# Patient Record
Sex: MALE | Race: WHITE | Employment: OTHER | URBAN - METROPOLITAN AREA
[De-identification: names, ages, dates, MRNs, and addresses within clinical notes are randomized per-mention and may not be internally consistent; named-entity substitution may affect disease eponyms.]

---

## 2017-04-28 RX ORDER — CARVEDILOL 12.5 MG/1
TABLET ORAL
Qty: 30 TABLET | Refills: 0 | Status: SHIPPED | OUTPATIENT
Start: 2017-04-28 | End: 2017-08-17

## 2017-04-28 RX ORDER — LISINOPRIL AND HYDROCHLOROTHIAZIDE 20; 12.5 MG/1; MG/1
TABLET ORAL
Qty: 15 TABLET | Refills: 0 | Status: SHIPPED | OUTPATIENT
Start: 2017-04-28 | End: 2017-08-17

## 2017-04-28 NOTE — TELEPHONE ENCOUNTER
Please call patient and have him schedule OV with Dr. Pam Gordon.   Last seen 7 months ago for his HTN, etc.  15 day supply only of these meds sent to his pharmacy until seen  thanks

## 2017-05-12 RX ORDER — LISINOPRIL AND HYDROCHLOROTHIAZIDE 20; 12.5 MG/1; MG/1
TABLET ORAL
Qty: 15 TABLET | Refills: 0 | OUTPATIENT
Start: 2017-05-12

## 2017-05-12 NOTE — TELEPHONE ENCOUNTER
Lisinopril-HCTZ not approved .  Patient needs to schedule appt for further refills  Please call to schedule  [phone] (home)

## 2017-08-17 ENCOUNTER — OFFICE VISIT (OUTPATIENT)
Dept: FAMILY MEDICINE CLINIC | Facility: CLINIC | Age: 57
End: 2017-08-17

## 2017-08-17 VITALS
HEIGHT: 70.25 IN | SYSTOLIC BLOOD PRESSURE: 134 MMHG | HEART RATE: 79 BPM | WEIGHT: 266.38 LBS | DIASTOLIC BLOOD PRESSURE: 74 MMHG | BODY MASS INDEX: 38.14 KG/M2

## 2017-08-17 DIAGNOSIS — Z12.5 SCREENING FOR MALIGNANT NEOPLASM OF PROSTATE: ICD-10-CM

## 2017-08-17 DIAGNOSIS — E66.01 SEVERE OBESITY (BMI 35.0-39.9): ICD-10-CM

## 2017-08-17 DIAGNOSIS — I83.893 SYMPTOMATIC VARICOSE VEINS OF BOTH LOWER EXTREMITIES: ICD-10-CM

## 2017-08-17 DIAGNOSIS — Z12.11 SCREENING FOR MALIGNANT NEOPLASM OF COLON: ICD-10-CM

## 2017-08-17 DIAGNOSIS — R79.89 ELEVATED LIVER FUNCTION TESTS: ICD-10-CM

## 2017-08-17 DIAGNOSIS — D69.6 THROMBOCYTOPENIA (HCC): ICD-10-CM

## 2017-08-17 DIAGNOSIS — E46 HYPOALBUMINEMIA DUE TO PROTEIN-CALORIE MALNUTRITION (HCC): ICD-10-CM

## 2017-08-17 DIAGNOSIS — E88.09 HYPOALBUMINEMIA DUE TO PROTEIN-CALORIE MALNUTRITION (HCC): ICD-10-CM

## 2017-08-17 DIAGNOSIS — F17.210 CIGARETTE SMOKER: ICD-10-CM

## 2017-08-17 DIAGNOSIS — D48.5 NEOPLASM OF UNCERTAIN BEHAVIOR OF SKIN: ICD-10-CM

## 2017-08-17 DIAGNOSIS — Z23 NEED FOR VACCINATION: ICD-10-CM

## 2017-08-17 DIAGNOSIS — I10 ESSENTIAL HYPERTENSION, BENIGN: Primary | ICD-10-CM

## 2017-08-17 DIAGNOSIS — R05.3 CHRONIC COUGH: ICD-10-CM

## 2017-08-17 PROCEDURE — 90471 IMMUNIZATION ADMIN: CPT | Performed by: FAMILY MEDICINE

## 2017-08-17 PROCEDURE — 99214 OFFICE O/P EST MOD 30 MIN: CPT | Performed by: FAMILY MEDICINE

## 2017-08-17 PROCEDURE — 90715 TDAP VACCINE 7 YRS/> IM: CPT | Performed by: FAMILY MEDICINE

## 2017-08-17 RX ORDER — CARVEDILOL 12.5 MG/1
12.5 TABLET ORAL 2 TIMES DAILY WITH MEALS
Qty: 90 TABLET | Refills: 1 | Status: SHIPPED | OUTPATIENT
Start: 2017-08-17 | End: 2017-12-06

## 2017-08-17 RX ORDER — LISINOPRIL AND HYDROCHLOROTHIAZIDE 20; 12.5 MG/1; MG/1
1 TABLET ORAL DAILY
Qty: 90 TABLET | Refills: 1 | Status: SHIPPED | OUTPATIENT
Start: 2017-08-17 | End: 2017-12-09

## 2017-08-17 NOTE — PATIENT INSTRUCTIONS
Please make an appointment for your annual wellness visit and follow up on your hypertension in 6 months or before the end of the year.

## 2017-08-17 NOTE — PROGRESS NOTES
Samreen Talamantes is a 61year old male. HPI:     HTN: Stable. Severity is mild, patient is on 3 agents to control his hypertension. No medication side effects.   Patient states he is taking the medication as directed and infrequently misses doses, cole abuse ([address] 9/23/2014   • Chronic cough 5/8/2015   • Cigarette smoker 5/8/2015    50 pack year h/o smoking    • Elevated liver function tests 7/24/2013   • Family history of bladder cancer 9/23/2014   • Hypoalbuminemia due to protein-calorie malnutrition ( communication, obese  male  SKIN: Raised hyperpigmented lesion of back. HEAD: NCAT  NECK: supple, no adenopathy, no thyromegaly, no masses  LUNGS: CTA A/P no wheezes/ronchi/rales/crackles.   Occasional cough dry to moist.  CARDIO: RRR, +S1/S2, no 12.4   PRELIMINARY NEUTROPHIL ABS      1.3 - 6.7 10(3)uL   1.66   Glucose      70 - 99 mg/dL 120 (H) 96 127 (H)   BUN      8 - 20 mg/dL 10 8 9   CREATININE      0.70 - 1.30 mg/dL 0.63 (L) 0.6 (L) 0.48 (L)   EGFR IF AFRICN AM      >60   145   EGFR IF NONAFR LIPID PANEL; Future  - ASSAY, THYROID STIM HORMONE; Future  - FREE T4 (FREE THYROXINE); Future    2. Thrombocytopenia ([facility] [address]  Chart updated to reflect this diagnosis. Patient currently without easy bleeding or easy bruising symptoms.   Recheck CBC in the fu (12.5 mg total) by mouth 2 (two) times daily with meals. Lisinopril-Hydrochlorothiazide 20-12.5 MG Oral Tab 90 tablet 1      Sig: Take 1 tablet by mouth daily.            Imaging & Consults:  TETANUS, DIPHTHERIA TOXOIDS AND ACELLULAR PERTUSIS VACCINE (

## 2017-08-22 PROBLEM — E88.09 HYPOALBUMINEMIA DUE TO PROTEIN-CALORIE MALNUTRITION (HCC): Status: ACTIVE | Noted: 2017-08-22

## 2017-08-22 PROBLEM — D69.6 THROMBOCYTOPENIA (HCC): Status: ACTIVE | Noted: 2017-08-22

## 2017-08-22 PROBLEM — I83.893 SYMPTOMATIC VARICOSE VEINS OF BOTH LOWER EXTREMITIES: Status: ACTIVE | Noted: 2017-08-22

## 2017-08-22 PROBLEM — E66.01 SEVERE OBESITY (BMI 35.0-39.9): Status: ACTIVE | Noted: 2017-08-22

## 2017-08-22 PROBLEM — E46 HYPOALBUMINEMIA DUE TO PROTEIN-CALORIE MALNUTRITION (HCC): Status: ACTIVE | Noted: 2017-08-22

## 2017-12-06 DIAGNOSIS — I10 ESSENTIAL HYPERTENSION, BENIGN: ICD-10-CM

## 2017-12-06 RX ORDER — CARVEDILOL 12.5 MG/1
12.5 TABLET ORAL 2 TIMES DAILY WITH MEALS
Qty: 30 TABLET | Refills: 0 | Status: SHIPPED | OUTPATIENT
Start: 2017-12-06 | End: 2019-02-14

## 2017-12-06 NOTE — TELEPHONE ENCOUNTER
rx approved qty 30 NR  Patient needs to complete labs prior to further refills  Copy of labs mailed to patient

## 2017-12-09 DIAGNOSIS — I10 ESSENTIAL HYPERTENSION, BENIGN: ICD-10-CM

## 2017-12-11 RX ORDER — ENOXAPARIN SODIUM 100 MG/ML
INJECTION SUBCUTANEOUS
Refills: 0 | COMMUNITY
Start: 2017-12-02 | End: 2018-12-17

## 2017-12-11 RX ORDER — LISINOPRIL AND HYDROCHLOROTHIAZIDE 20; 12.5 MG/1; MG/1
1 TABLET ORAL DAILY
Qty: 30 TABLET | Refills: 0 | Status: SHIPPED | OUTPATIENT
Start: 2017-12-11 | End: 2019-02-14

## 2017-12-11 NOTE — TELEPHONE ENCOUNTER
rx approved qty 30 NR  Patient past due to complete labs  Copy of lab orders has been mailed to patient

## 2017-12-31 DIAGNOSIS — I10 ESSENTIAL HYPERTENSION, BENIGN: ICD-10-CM

## 2018-01-02 RX ORDER — CARVEDILOL 12.5 MG/1
12.5 TABLET ORAL 2 TIMES DAILY WITH MEALS
Qty: 30 TABLET | Refills: 0 | OUTPATIENT
Start: 2018-01-02

## 2018-01-02 NOTE — TELEPHONE ENCOUNTER
Unable to refill carvedilol  Patient needs to complete labs  Lab orders were mailed to patient 12/6/17

## 2018-01-13 ENCOUNTER — OFFICE VISIT (OUTPATIENT)
Dept: FAMILY MEDICINE CLINIC | Facility: CLINIC | Age: 58
End: 2018-01-13

## 2018-01-13 VITALS
TEMPERATURE: 99 F | SYSTOLIC BLOOD PRESSURE: 122 MMHG | RESPIRATION RATE: 16 BRPM | OXYGEN SATURATION: 98 % | BODY MASS INDEX: 37.24 KG/M2 | HEART RATE: 60 BPM | HEIGHT: 71 IN | DIASTOLIC BLOOD PRESSURE: 72 MMHG | WEIGHT: 266 LBS

## 2018-01-13 DIAGNOSIS — J01.10 ACUTE NON-RECURRENT FRONTAL SINUSITIS: Primary | ICD-10-CM

## 2018-01-13 PROCEDURE — 99213 OFFICE O/P EST LOW 20 MIN: CPT | Performed by: NURSE PRACTITIONER

## 2018-01-13 RX ORDER — DOXYCYCLINE HYCLATE 100 MG
100 TABLET ORAL 2 TIMES DAILY
Qty: 14 TABLET | Refills: 0 | Status: SHIPPED | OUTPATIENT
Start: 2018-01-13 | End: 2018-01-20

## 2018-01-13 RX ORDER — FLUTICASONE PROPIONATE 50 MCG
2 SPRAY, SUSPENSION (ML) NASAL DAILY
Qty: 1 INHALER | Refills: 1 | Status: SHIPPED | OUTPATIENT
Start: 2018-01-13 | End: 2018-12-17

## 2018-01-13 NOTE — PROGRESS NOTES
CHIEF COMPLAINT:   Patient presents with:  Sinus Problem: both ear clot, sinus congestion, chest congestion [id number]    HPI:   Jc Guadarrama is a 61year old male who presents for sinus congestion for  12  days.  Symptoms have been worsening since onset Smoking status: Current Every Day Smoker                                                   Packs/day: 0.50      Years: 50.00        Types: Cigarettes  Smokeless tobacco: Former User                        Types: Snuff  Comment: 2ppd for 20yrs, 1ppd for 10 1. Acute non-recurrent frontal sinusitis  - Doxycycline Hyclate 100 MG Oral Tab; Take 1 tablet (100 mg total) by mouth 2 (two) times daily. Dispense: 14 tablet;  Refill: 0  - Fluticasone Propionate (FLONASE) 50 MCG/ACT Nasal Suspension; 2 sprays by Each Na · Heat may help soothe painful areas of the face. Use a towel soaked in hot water. Or,  the shower and direct the hot spray onto your face.  Using a vaporizer along with a menthol rub at night may also help.   · An expectorant containing guaifenesin · Vision problems, including blurred or double vision  · Fever of 100.4ºF (38ºC) or higher, or as directed by your healthcare provider  · Seizure  · Breathing problems  · Symptoms not resolving within 10 days  Date Last Reviewed: 4/13/2015  © 7548-7969 The Your doctor may prescribe medications to help treat your sinusitis. If you have an infection, antibiotics can help clear it up. If you are prescribed antibiotics, take all pills on schedule until they are gone, even if you feel better.  Decongestants help r

## 2018-11-26 ENCOUNTER — TELEPHONE (OUTPATIENT)
Dept: FAMILY MEDICINE CLINIC | Facility: CLINIC | Age: 58
End: 2018-11-26

## 2018-11-26 DIAGNOSIS — E46 HYPOALBUMINEMIA DUE TO PROTEIN-CALORIE MALNUTRITION (HCC): ICD-10-CM

## 2018-11-26 DIAGNOSIS — E88.09 HYPOALBUMINEMIA DUE TO PROTEIN-CALORIE MALNUTRITION (HCC): ICD-10-CM

## 2018-11-26 DIAGNOSIS — Z12.5 SCREENING FOR MALIGNANT NEOPLASM OF PROSTATE: Primary | ICD-10-CM

## 2018-11-26 DIAGNOSIS — D69.6 THROMBOCYTOPENIA, UNSPECIFIED (HCC): ICD-10-CM

## 2018-11-26 DIAGNOSIS — I10 ESSENTIAL (PRIMARY) HYPERTENSION: ICD-10-CM

## 2018-11-26 NOTE — TELEPHONE ENCOUNTER
Pt wants to know if the labs from last year can be re-ordered so he can come in and get them done. He knows he never had the testing done that Dr. Юлия Gomez wanted him to get done but he said he is ready to get it all done.

## 2018-11-27 NOTE — TELEPHONE ENCOUNTER
Okay to reorder labs. Please encourage patient to make an appointment to see me after labs are completed.

## 2018-11-27 NOTE — TELEPHONE ENCOUNTER
Labs reordered   Per phone consent left message for patient regarding labs and need to schedule appt with Dr Varinder Silva

## 2018-12-04 ENCOUNTER — LAB ENCOUNTER (OUTPATIENT)
Dept: LAB | Age: 58
End: 2018-12-04
Attending: FAMILY MEDICINE
Payer: COMMERCIAL

## 2018-12-04 DIAGNOSIS — I10 ESSENTIAL (PRIMARY) HYPERTENSION: ICD-10-CM

## 2018-12-04 DIAGNOSIS — D69.6 THROMBOCYTOPENIA, UNSPECIFIED (HCC): ICD-10-CM

## 2018-12-04 DIAGNOSIS — E46 HYPOALBUMINEMIA DUE TO PROTEIN-CALORIE MALNUTRITION (HCC): ICD-10-CM

## 2018-12-04 DIAGNOSIS — E88.09 HYPOALBUMINEMIA DUE TO PROTEIN-CALORIE MALNUTRITION (HCC): ICD-10-CM

## 2018-12-04 DIAGNOSIS — Z12.5 SCREENING FOR MALIGNANT NEOPLASM OF PROSTATE: ICD-10-CM

## 2018-12-04 PROCEDURE — 80053 COMPREHEN METABOLIC PANEL: CPT

## 2018-12-04 PROCEDURE — 80061 LIPID PANEL: CPT

## 2018-12-04 PROCEDURE — 84443 ASSAY THYROID STIM HORMONE: CPT

## 2018-12-04 PROCEDURE — 36415 COLL VENOUS BLD VENIPUNCTURE: CPT

## 2018-12-04 PROCEDURE — 85025 COMPLETE CBC W/AUTO DIFF WBC: CPT

## 2018-12-04 PROCEDURE — 84439 ASSAY OF FREE THYROXINE: CPT

## 2018-12-10 ENCOUNTER — TELEPHONE (OUTPATIENT)
Dept: FAMILY MEDICINE CLINIC | Facility: CLINIC | Age: 58
End: 2018-12-10

## 2018-12-10 NOTE — TELEPHONE ENCOUNTER
----- Message from Veronica Darling DO sent at 12/9/2018  4:59 PM CST -----  Regarding: Very Abnormal CBC  Hello S[place],  The above patient has a very abnormal CBC. Diagnosis is pancytopenia.  (Pancytopenia definition is - an abnormal reduction in the number

## 2018-12-10 NOTE — TELEPHONE ENCOUNTER
lmom for pt to call office to discuss. Pt called back and discussed the results and gave him the number to the cancer [facility] center. Asked him to schedule with the first available provider and to also call our office with the schedule date.  Pt verbalize

## 2018-12-11 NOTE — TELEPHONE ENCOUNTER
Pt did call back and states that he has an appt with Dr. Felisa Schaefer on 12/26/18 at 9:45 as that was the first available. Discussed with Dr. Hanane Moreno and she wants pt seen sooner.   Placed a call to Dr. Cristina Castillo office per Dr. Hanane Moreno and del for [facility] to ca

## 2018-12-17 ENCOUNTER — OFFICE VISIT (OUTPATIENT)
Dept: HEMATOLOGY/ONCOLOGY | Facility: HOSPITAL | Age: 58
End: 2018-12-17
Attending: INTERNAL MEDICINE
Payer: COMMERCIAL

## 2018-12-17 VITALS
HEART RATE: 65 BPM | OXYGEN SATURATION: 98 % | SYSTOLIC BLOOD PRESSURE: 158 MMHG | HEIGHT: 71.89 IN | DIASTOLIC BLOOD PRESSURE: 91 MMHG | TEMPERATURE: 99 F | RESPIRATION RATE: 18 BRPM | WEIGHT: 254.5 LBS | BODY MASS INDEX: 34.47 KG/M2

## 2018-12-17 DIAGNOSIS — D69.6 THROMBOCYTOPENIA (HCC): ICD-10-CM

## 2018-12-17 DIAGNOSIS — D61.818 PANCYTOPENIA (HCC): Primary | ICD-10-CM

## 2018-12-17 DIAGNOSIS — R79.89 ELEVATED LFTS: ICD-10-CM

## 2018-12-17 PROCEDURE — 99245 OFF/OP CONSLTJ NEW/EST HI 55: CPT | Performed by: INTERNAL MEDICINE

## 2018-12-17 NOTE — PROGRESS NOTES
Pt here for consult referred by Dr. Yamileth Toth for abn blood work. Pt has been feeling well. Denies pain/bleeding/fevers. Pt had teeth pulled and has had to change his diet to certain types of foods and has lost 30 pounds in 1 month.

## 2018-12-17 NOTE — PROGRESS NOTES
Hematology/Oncology Initial Consultation Note    Patient Name: Haleigh Birmingham Record Number: [id number]    YOB: 1960   Date of Consultation: 12/17/2018   PCP: Dr. Adeline Medrano     Reason for Consultation:  Gerry witt se Medical History:   Diagnosis Date   • Alcoholism /alcohol abuse ([facility] [address] 9/23/2014   • Chronic cough 5/8/2015   • Cigarette smoker 5/8/2015    50 pack year h/o smoking    • Elevated liver function tests 7/24/2013   • Family history of bladder cancer 9/23/2014 Drug use: No    Family Medical History:  Family History   Problem Relation Age of Onset   • Cancer Neg    • Blood Disorder Neg      Review of Systems:  A 10-point ROS was done with pertinent positives and negative per the HPI    Vital Signs:  Height: 182. 6 12/04/2018     10/25/2016    GFRNAA 108 12/04/2018    GFRAA 125 12/04/2018    CA 8.2 (L) 12/04/2018    OSMOCALC 290 12/04/2018    ALKPHO 190 (H) 12/04/2018    AST 86 (H) 12/04/2018    ALT 44 12/04/2018    BILT 1.5 12/04/2018    TP 7.2 12/04/2018

## 2018-12-26 ENCOUNTER — APPOINTMENT (OUTPATIENT)
Dept: HEMATOLOGY/ONCOLOGY | Facility: HOSPITAL | Age: 58
End: 2018-12-26
Payer: COMMERCIAL

## 2019-01-05 ENCOUNTER — HOSPITAL ENCOUNTER (OUTPATIENT)
Dept: ULTRASOUND IMAGING | Age: 59
Discharge: HOME | End: 2019-01-05
Attending: INTERNAL MEDICINE
Payer: COMMERCIAL

## 2019-01-05 DIAGNOSIS — D61.818 PANCYTOPENIA (HCC): ICD-10-CM

## 2019-01-05 DIAGNOSIS — R79.89 ELEVATED LFTS: ICD-10-CM

## 2019-01-05 DIAGNOSIS — D69.6 THROMBOCYTOPENIA (HCC): ICD-10-CM

## 2019-01-05 PROCEDURE — 76700 US EXAM ABDOM COMPLETE: CPT | Performed by: INTERNAL MEDICINE

## 2019-01-08 ENCOUNTER — TELEPHONE (OUTPATIENT)
Dept: HEMATOLOGY/ONCOLOGY | Facility: HOSPITAL | Age: 59
End: 2019-01-08

## 2019-01-08 NOTE — TELEPHONE ENCOUNTER
I called patient and reviewed his imaging findings as well as his lab workup for thrombocytopenia.   His hepatitis C testing came back positive and he has an associated polyclonal hypergammaglobulinemia and LFT elevation as well as liver changes noted on hi

## 2019-01-21 ENCOUNTER — TELEPHONE (OUTPATIENT)
Dept: SURGERY | Facility: CLINIC | Age: 59
End: 2019-01-21

## 2019-01-21 ENCOUNTER — LAB ENCOUNTER (OUTPATIENT)
Dept: LAB | Age: 59
End: 2019-01-21
Attending: INTERNAL MEDICINE
Payer: COMMERCIAL

## 2019-01-21 ENCOUNTER — OFFICE VISIT (OUTPATIENT)
Dept: SURGERY | Facility: CLINIC | Age: 59
End: 2019-01-21
Payer: COMMERCIAL

## 2019-01-21 VITALS
HEART RATE: 66 BPM | DIASTOLIC BLOOD PRESSURE: 88 MMHG | OXYGEN SATURATION: 97 % | SYSTOLIC BLOOD PRESSURE: 151 MMHG | WEIGHT: 258 LBS | RESPIRATION RATE: 16 BRPM | BODY MASS INDEX: 35 KG/M2

## 2019-01-21 DIAGNOSIS — K74.60 CHRONIC HEPATITIS C WITH CIRRHOSIS (HCC): ICD-10-CM

## 2019-01-21 DIAGNOSIS — B18.2 CHRONIC HEPATITIS C WITH CIRRHOSIS (HCC): ICD-10-CM

## 2019-01-21 DIAGNOSIS — K74.60 CHRONIC HEPATITIS C WITH CIRRHOSIS (HCC): Primary | ICD-10-CM

## 2019-01-21 DIAGNOSIS — R77.2 ELEVATED AFP: ICD-10-CM

## 2019-01-21 DIAGNOSIS — C22.0 HEPATOCELLULAR CARCINOMA (HCC): Primary | ICD-10-CM

## 2019-01-21 DIAGNOSIS — B18.2 CHRONIC HEPATITIS C WITH CIRRHOSIS (HCC): Primary | ICD-10-CM

## 2019-01-21 LAB
AFP-TM SERPL-MCNC: 3785.7 NG/ML (ref ?–8)
ALBUMIN SERPL-MCNC: 3 G/DL (ref 3.1–4.5)
ALBUMIN/GLOB SERPL: 0.6 {RATIO} (ref 1–2)
ALP LIVER SERPL-CCNC: 234 U/L (ref 45–117)
ALT SERPL-CCNC: 56 U/L (ref 17–63)
ANION GAP SERPL CALC-SCNC: 4 MMOL/L (ref 0–18)
AST SERPL-CCNC: 118 U/L (ref 15–41)
BASOPHILS # BLD AUTO: 0.02 X10(3) UL (ref 0–0.1)
BASOPHILS NFR BLD AUTO: 0.7 %
BILIRUB SERPL-MCNC: 2.1 MG/DL (ref 0.1–2)
BUN BLD-MCNC: 9 MG/DL (ref 8–20)
BUN/CREAT SERPL: 12.2 (ref 10–20)
CALCIUM BLD-MCNC: 8.5 MG/DL (ref 8.3–10.3)
CHLORIDE SERPL-SCNC: 105 MMOL/L (ref 101–111)
CO2 SERPL-SCNC: 30 MMOL/L (ref 22–32)
CREAT BLD-MCNC: 0.74 MG/DL (ref 0.7–1.3)
EOSINOPHIL # BLD AUTO: 0.1 X10(3) UL (ref 0–0.3)
EOSINOPHIL NFR BLD AUTO: 3.7 %
ERYTHROCYTE [DISTWIDTH] IN BLOOD BY AUTOMATED COUNT: 13.7 % (ref 11.5–16)
GLOBULIN PLAS-MCNC: 4.7 G/DL (ref 2.8–4.4)
GLUCOSE BLD-MCNC: 107 MG/DL (ref 70–99)
HBV CORE AB SERPL QL IA: NONREACTIVE
HBV SURFACE AB SER QL: NONREACTIVE
HBV SURFACE AB SERPL IA-ACNC: <3.1 MIU/ML
HBV SURFACE AG SER-ACNC: <0.1 [IU]/L
HBV SURFACE AG SERPL QL IA: NONREACTIVE
HCT VFR BLD AUTO: 35.9 % (ref 37–53)
HEPATITIS A VIRUS AB, TOTAL: NONREACTIVE
HGB BLD-MCNC: 13.1 G/DL (ref 13–17)
IMMATURE GRANULOCYTE COUNT: 0.02 X10(3) UL (ref 0–1)
IMMATURE GRANULOCYTE RATIO %: 0.7 %
INR BLD: 1.38 (ref 0.9–1.1)
LYMPHOCYTES # BLD AUTO: 0.81 X10(3) UL (ref 0.9–4)
LYMPHOCYTES NFR BLD AUTO: 29.9 %
M PROTEIN MFR SERPL ELPH: 7.7 G/DL (ref 6.4–8.2)
MCH RBC QN AUTO: 36.6 PG (ref 27–33.2)
MCHC RBC AUTO-ENTMCNC: 36.5 G/DL (ref 31–37)
MCV RBC AUTO: 100.3 FL (ref 80–99)
MONOCYTES # BLD AUTO: 0.33 X10(3) UL (ref 0.1–1)
MONOCYTES NFR BLD AUTO: 12.2 %
NEUTROPHIL ABS PRELIM: 1.43 X10 (3) UL (ref 1.3–6.7)
NEUTROPHILS # BLD AUTO: 1.43 X10(3) UL (ref 1.3–6.7)
NEUTROPHILS NFR BLD AUTO: 52.8 %
OSMOLALITY SERPL CALC.SUM OF ELEC: 287 MOSM/KG (ref 275–295)
PLATELET # BLD AUTO: 69 10(3)UL (ref 150–450)
POTASSIUM SERPL-SCNC: 3.8 MMOL/L (ref 3.6–5.1)
PSA SERPL DL<=0.01 NG/ML-MCNC: 17.5 SECONDS (ref 12.4–14.7)
RBC # BLD AUTO: 3.58 X10(6)UL (ref 4.3–5.7)
RED CELL DISTRIBUTION WIDTH-SD: 50.9 FL (ref 35.1–46.3)
SODIUM SERPL-SCNC: 139 MMOL/L (ref 136–144)
WBC # BLD AUTO: 2.7 X10(3) UL (ref 4–13)

## 2019-01-21 PROCEDURE — 84460 ALANINE AMINO (ALT) (SGPT): CPT

## 2019-01-21 PROCEDURE — 85610 PROTHROMBIN TIME: CPT

## 2019-01-21 PROCEDURE — 83883 ASSAY NEPHELOMETRY NOT SPEC: CPT

## 2019-01-21 PROCEDURE — 85049 AUTOMATED PLATELET COUNT: CPT

## 2019-01-21 PROCEDURE — 85025 COMPLETE CBC W/AUTO DIFF WBC: CPT

## 2019-01-21 PROCEDURE — 82977 ASSAY OF GGT: CPT

## 2019-01-21 PROCEDURE — 82105 ALPHA-FETOPROTEIN SERUM: CPT

## 2019-01-21 PROCEDURE — 87340 HEPATITIS B SURFACE AG IA: CPT

## 2019-01-21 PROCEDURE — 80053 COMPREHEN METABOLIC PANEL: CPT

## 2019-01-21 PROCEDURE — 86708 HEPATITIS A ANTIBODY: CPT

## 2019-01-21 PROCEDURE — 84450 TRANSFERASE (AST) (SGOT): CPT

## 2019-01-21 PROCEDURE — 86704 HEP B CORE ANTIBODY TOTAL: CPT

## 2019-01-21 PROCEDURE — 86706 HEP B SURFACE ANTIBODY: CPT

## 2019-01-21 PROCEDURE — 36415 COLL VENOUS BLD VENIPUNCTURE: CPT

## 2019-01-21 PROCEDURE — 84520 ASSAY OF UREA NITROGEN: CPT

## 2019-01-21 NOTE — PROGRESS NOTES
[facility]  [address], [address]  [address]  [phone] ([phone]) 45.00        Pack years: 45        Types: Cigarettes      Smokeless tobacco: Former User        Types: Snuff      Tobacco comment: 2ppd for 20yrs, 1ppd for 10 years, 10 cigs per day for the last few years    Alcohol use:  Yes      Alcohol/week: 1.2 - 2.4 oz [address] [place] [address] [facility] (office)  [phone] (fax)  [phone] (mobile)  [email or web address]

## 2019-01-22 NOTE — TELEPHONE ENCOUNTER
Discussed with patient the significantly elevated AFP and urgent need to get CT scan done. He will schedule CT as planned.

## 2019-01-24 LAB
ALANINE AMINOTRANSFERASE, FIBROMETER: 52 U/L
ALPHA-2 MACROGLOBULIN, FIBROMETER: 297 MG/DL
ASPARTATE AMINOTRANSFERASE, FIBROMETER: 116 U/L
CIRRHOMETER PATIENT SCORE: 0.98
FIBROMETER PATIENT SCORE: 0.99
FIBROMETER PLATELET COUNT: 69 K/UL
FIBROMETER PROTHROMBIN INDEX: 53 %
GAMMA GLUTAMYL TRANSFERASE, FIBROMETER: 243 U/L
INFLAMETER PATIENT SCORE: 0.83
UREA NITROGEN, SERUM, FIBROMETER: 10 MG/DL

## 2019-02-02 ENCOUNTER — HOSPITAL ENCOUNTER (OUTPATIENT)
Dept: CT IMAGING | Facility: HOSPITAL | Age: 59
Discharge: HOME | End: 2019-02-02
Attending: INTERNAL MEDICINE
Payer: COMMERCIAL

## 2019-02-02 DIAGNOSIS — K74.60 CHRONIC HEPATITIS C WITH CIRRHOSIS (HCC): ICD-10-CM

## 2019-02-02 DIAGNOSIS — B18.2 CHRONIC HEPATITIS C WITH CIRRHOSIS (HCC): ICD-10-CM

## 2019-02-02 DIAGNOSIS — R77.2 ELEVATED AFP: ICD-10-CM

## 2019-02-02 LAB — CREAT SERPL-MCNC: 0.7 MG/DL (ref 0.7–1.3)

## 2019-02-02 PROCEDURE — 82565 ASSAY OF CREATININE: CPT

## 2019-02-02 PROCEDURE — 74170 CT ABD WO CNTRST FLWD CNTRST: CPT | Performed by: INTERNAL MEDICINE

## 2019-02-03 ENCOUNTER — HOSPITAL ENCOUNTER (OUTPATIENT)
Dept: CT IMAGING | Age: 59
Discharge: HOME | End: 2019-02-03
Attending: INTERNAL MEDICINE
Payer: COMMERCIAL

## 2019-02-03 DIAGNOSIS — C22.0 HEPATOCELLULAR CARCINOMA (HCC): ICD-10-CM

## 2019-02-03 PROCEDURE — 71250 CT THORAX DX C-: CPT | Performed by: INTERNAL MEDICINE

## 2019-02-11 ENCOUNTER — TELEPHONE (OUTPATIENT)
Dept: SURGERY | Facility: CLINIC | Age: 59
End: 2019-02-11

## 2019-02-11 DIAGNOSIS — B18.2 CHRONIC HEPATITIS C WITH CIRRHOSIS (HCC): Primary | ICD-10-CM

## 2019-02-11 DIAGNOSIS — R77.2 ELEVATED AFP: ICD-10-CM

## 2019-02-11 DIAGNOSIS — K74.60 CHRONIC HEPATITIS C WITH CIRRHOSIS (HCC): Primary | ICD-10-CM

## 2019-02-11 NOTE — TELEPHONE ENCOUNTER
Discussed results of scans with patient. Will get MRI to see if can better delineate mass, probably infiltrative. Lung lesion could easily be second primary.  Will likely need biopsy as this will help decide treatment on liver cancer if local infiltrative v

## 2019-02-14 ENCOUNTER — OFFICE VISIT (OUTPATIENT)
Dept: FAMILY MEDICINE CLINIC | Facility: CLINIC | Age: 59
End: 2019-02-14
Payer: COMMERCIAL

## 2019-02-14 VITALS
BODY MASS INDEX: 35.42 KG/M2 | OXYGEN SATURATION: 98 % | SYSTOLIC BLOOD PRESSURE: 102 MMHG | WEIGHT: 253 LBS | HEART RATE: 68 BPM | HEIGHT: 71 IN | DIASTOLIC BLOOD PRESSURE: 66 MMHG

## 2019-02-14 DIAGNOSIS — D61.818 PANCYTOPENIA (HCC): ICD-10-CM

## 2019-02-14 DIAGNOSIS — R16.1 SPLENOMEGALY: ICD-10-CM

## 2019-02-14 DIAGNOSIS — I10 ESSENTIAL HYPERTENSION, BENIGN: Primary | ICD-10-CM

## 2019-02-14 DIAGNOSIS — K74.60 CHRONIC HEPATITIS C WITH CIRRHOSIS (HCC): ICD-10-CM

## 2019-02-14 DIAGNOSIS — B18.2 CHRONIC HEPATITIS C WITH CIRRHOSIS (HCC): ICD-10-CM

## 2019-02-14 DIAGNOSIS — E88.09 HYPOALBUMINEMIA: ICD-10-CM

## 2019-02-14 DIAGNOSIS — D69.6 THROMBOCYTOPENIA (HCC): ICD-10-CM

## 2019-02-14 DIAGNOSIS — R60.0 BILATERAL LOWER EXTREMITY EDEMA: ICD-10-CM

## 2019-02-14 DIAGNOSIS — R16.0 LIVER MASSES: ICD-10-CM

## 2019-02-14 DIAGNOSIS — R91.8 MASS OF UPPER LOBE OF RIGHT LUNG: ICD-10-CM

## 2019-02-14 DIAGNOSIS — R73.01 IMPAIRED FASTING GLUCOSE: ICD-10-CM

## 2019-02-14 LAB
EST. AVERAGE GLUCOSE BLD GHB EST-MCNC: 111 MG/DL (ref 68–126)
HBA1C MFR BLD HPLC: 5.5 % (ref ?–5.7)

## 2019-02-14 PROCEDURE — 99214 OFFICE O/P EST MOD 30 MIN: CPT | Performed by: FAMILY MEDICINE

## 2019-02-14 PROCEDURE — 83036 HEMOGLOBIN GLYCOSYLATED A1C: CPT | Performed by: FAMILY MEDICINE

## 2019-02-14 RX ORDER — CARVEDILOL 12.5 MG/1
12.5 TABLET ORAL 2 TIMES DAILY WITH MEALS
Qty: 180 TABLET | Refills: 1 | Status: SHIPPED | OUTPATIENT
Start: 2019-02-14 | End: 2019-09-21

## 2019-02-14 RX ORDER — LISINOPRIL AND HYDROCHLOROTHIAZIDE 20; 12.5 MG/1; MG/1
1 TABLET ORAL DAILY
Qty: 90 TABLET | Refills: 1 | Status: SHIPPED | OUTPATIENT
Start: 2019-02-14 | End: 2019-03-18

## 2019-02-14 NOTE — PROGRESS NOTES
Gerri Perez is a 60year old male. HPI:     HTN:    Stable. Severity is mild to moderate pt is on 3 agents to control his hypertension. Pt has been taking medications as instructed, no medication side effects.    Diet: No particular diet being fo Laterality Date   • BIOPSY OF SKIN LESION      face and leg   • IR VARICOSE VEIN ENDOVENOUS LASER ABLATION(CPT=36478)  2018      Social History:    Social History    Tobacco Use      Smoking status: Current Every Day Smoker        Packs/day: 1.00        Sarah Moon grossly intact, no focal weakness  PSYCH: affect normal, normal thought content.         DATA:    Component      Latest Ref Rng & Units 12/4/2018 10/25/2016   Glucose      70 - 99 mg/dL 114 (H) 120 (H)   Sodium      136 - 144 mmol/L 140 137   Potassium lisinopril–hydrochlorothiazide. Follow-up office visit in 3 months. - Lisinopril-Hydrochlorothiazide 20-12.5 MG Oral Tab; Take 1 tablet by mouth daily. Dispense: 90 tablet; Refill: 1  - carvedilol 12.5 MG Oral Tab;  Take 1 tablet (12.5 mg total) by chacorta

## 2019-02-20 ENCOUNTER — HOSPITAL ENCOUNTER (OUTPATIENT)
Dept: MRI IMAGING | Age: 59
Discharge: HOME | End: 2019-02-20
Attending: INTERNAL MEDICINE
Payer: COMMERCIAL

## 2019-02-20 DIAGNOSIS — B18.2 CHRONIC HEPATITIS C WITH CIRRHOSIS (HCC): ICD-10-CM

## 2019-02-20 DIAGNOSIS — R77.2 ELEVATED AFP: ICD-10-CM

## 2019-02-20 DIAGNOSIS — K74.60 CHRONIC HEPATITIS C WITH CIRRHOSIS (HCC): ICD-10-CM

## 2019-02-20 PROCEDURE — A9581 GADOXETATE DISODIUM INJ: HCPCS | Performed by: INTERNAL MEDICINE

## 2019-02-20 PROCEDURE — 74183 MRI ABD W/O CNTR FLWD CNTR: CPT | Performed by: INTERNAL MEDICINE

## 2019-02-22 ENCOUNTER — TELEPHONE (OUTPATIENT)
Dept: SURGERY | Facility: CLINIC | Age: 59
End: 2019-02-22

## 2019-02-22 DIAGNOSIS — R91.1 LESION OF RIGHT LUNG: Primary | ICD-10-CM

## 2019-02-22 NOTE — TELEPHONE ENCOUNTER
Reviewed MRI with IR. Concern for infiltrating HCC but no discreet mass on MRI. Will plan on biopsy of lung lesion to evaluate for second primary versus metastatic disease and then clarify approach to [facility] [address] Discussed with patient and order placed.

## 2019-03-13 ENCOUNTER — HOSPITAL ENCOUNTER (OUTPATIENT)
Dept: GENERAL RADIOLOGY | Facility: HOSPITAL | Age: 59
Discharge: HOME | End: 2019-03-13
Attending: RADIOLOGY
Payer: COMMERCIAL

## 2019-03-13 ENCOUNTER — HOSPITAL ENCOUNTER (OUTPATIENT)
Dept: CT IMAGING | Facility: HOSPITAL | Age: 59
Discharge: HOME | End: 2019-03-13
Attending: INTERNAL MEDICINE
Payer: COMMERCIAL

## 2019-03-13 ENCOUNTER — APPOINTMENT (OUTPATIENT)
Dept: LAB | Facility: HOSPITAL | Age: 59
End: 2019-03-13
Attending: INTERNAL MEDICINE
Payer: COMMERCIAL

## 2019-03-13 VITALS
DIASTOLIC BLOOD PRESSURE: 73 MMHG | HEIGHT: 72 IN | SYSTOLIC BLOOD PRESSURE: 118 MMHG | WEIGHT: 256 LBS | BODY MASS INDEX: 34.67 KG/M2 | RESPIRATION RATE: 18 BRPM | OXYGEN SATURATION: 100 % | TEMPERATURE: 98 F | HEART RATE: 65 BPM

## 2019-03-13 DIAGNOSIS — C34.90 LUNG CANCER (HCC): ICD-10-CM

## 2019-03-13 DIAGNOSIS — R91.1 LESION OF RIGHT LUNG: ICD-10-CM

## 2019-03-13 DIAGNOSIS — R91.8 MASS OF UPPER LOBE OF RIGHT LUNG: ICD-10-CM

## 2019-03-13 DIAGNOSIS — R91.8 MASS OF UPPER LOBE OF RIGHT LUNG: Primary | ICD-10-CM

## 2019-03-13 LAB
DEPRECATED RDW RBC AUTO: 55.3 FL (ref 35.1–46.3)
ERYTHROCYTE [DISTWIDTH] IN BLOOD BY AUTOMATED COUNT: 14.8 % (ref 11–15)
HCT VFR BLD AUTO: 37.1 % (ref 39–53)
HGB BLD-MCNC: 13.3 G/DL (ref 13–17.5)
INR BLD: 1.5 (ref 0.9–1.1)
MCH RBC QN AUTO: 35.7 PG (ref 26–34)
MCHC RBC AUTO-ENTMCNC: 35.8 G/DL (ref 31–37)
MCV RBC AUTO: 99.5 FL (ref 80–100)
PLATELET # BLD AUTO: 84 10(3)UL (ref 150–450)
PSA SERPL DL<=0.01 NG/ML-MCNC: 18.9 SECONDS (ref 12.5–14.7)
RBC # BLD AUTO: 3.73 X10(6)UL (ref 4.3–5.7)
WBC # BLD AUTO: 3.4 X10(3) UL (ref 4–11)

## 2019-03-13 PROCEDURE — 99153 MOD SED SAME PHYS/QHP EA: CPT | Performed by: INTERNAL MEDICINE

## 2019-03-13 PROCEDURE — 99152 MOD SED SAME PHYS/QHP 5/>YRS: CPT | Performed by: INTERNAL MEDICINE

## 2019-03-13 PROCEDURE — 85027 COMPLETE CBC AUTOMATED: CPT

## 2019-03-13 PROCEDURE — 88360 TUMOR IMMUNOHISTOCHEM/MANUAL: CPT | Performed by: INTERNAL MEDICINE

## 2019-03-13 PROCEDURE — 88342 IMHCHEM/IMCYTCHM 1ST ANTB: CPT | Performed by: INTERNAL MEDICINE

## 2019-03-13 PROCEDURE — 88305 TISSUE EXAM BY PATHOLOGIST: CPT | Performed by: INTERNAL MEDICINE

## 2019-03-13 PROCEDURE — 36415 COLL VENOUS BLD VENIPUNCTURE: CPT

## 2019-03-13 PROCEDURE — 85610 PROTHROMBIN TIME: CPT

## 2019-03-13 PROCEDURE — 32405 CT BIOPSY LUNG OR MEDIASTINUM (CPT=77012/32405): CPT | Performed by: INTERNAL MEDICINE

## 2019-03-13 PROCEDURE — 88341 IMHCHEM/IMCYTCHM EA ADD ANTB: CPT | Performed by: INTERNAL MEDICINE

## 2019-03-13 PROCEDURE — 71045 X-RAY EXAM CHEST 1 VIEW: CPT | Performed by: RADIOLOGY

## 2019-03-13 PROCEDURE — 77012 CT SCAN FOR NEEDLE BIOPSY: CPT | Performed by: INTERNAL MEDICINE

## 2019-03-13 RX ORDER — SODIUM CHLORIDE 9 MG/ML
INJECTION, SOLUTION INTRAVENOUS CONTINUOUS
Status: DISCONTINUED | OUTPATIENT
Start: 2019-03-13 | End: 2019-03-15

## 2019-03-13 RX ORDER — MIDAZOLAM HYDROCHLORIDE 1 MG/ML
INJECTION INTRAMUSCULAR; INTRAVENOUS
Status: COMPLETED
Start: 2019-03-13 | End: 2019-03-13

## 2019-03-13 RX ORDER — NALOXONE HYDROCHLORIDE 0.4 MG/ML
80 INJECTION, SOLUTION INTRAMUSCULAR; INTRAVENOUS; SUBCUTANEOUS AS NEEDED
Status: DISCONTINUED | OUTPATIENT
Start: 2019-03-13 | End: 2019-03-15

## 2019-03-13 RX ORDER — MORPHINE SULFATE 2 MG/ML
2 INJECTION, SOLUTION INTRAMUSCULAR; INTRAVENOUS EVERY 2 HOUR PRN
Status: DISCONTINUED | OUTPATIENT
Start: 2019-03-13 | End: 2019-03-15

## 2019-03-13 RX ORDER — HYDROCODONE BITARTRATE AND ACETAMINOPHEN 5; 325 MG/1; MG/1
2 TABLET ORAL EVERY 4 HOURS PRN
Status: DISCONTINUED | OUTPATIENT
Start: 2019-03-13 | End: 2019-03-15

## 2019-03-13 RX ORDER — MIDAZOLAM HYDROCHLORIDE 1 MG/ML
1 INJECTION INTRAMUSCULAR; INTRAVENOUS EVERY 5 MIN PRN
Status: DISCONTINUED | OUTPATIENT
Start: 2019-03-13 | End: 2019-03-15

## 2019-03-13 RX ORDER — ACETAMINOPHEN 325 MG/1
650 TABLET ORAL EVERY 6 HOURS PRN
Status: DISCONTINUED | OUTPATIENT
Start: 2019-03-13 | End: 2019-03-15

## 2019-03-13 RX ORDER — HYDROCODONE BITARTRATE AND ACETAMINOPHEN 5; 325 MG/1; MG/1
1 TABLET ORAL EVERY 4 HOURS PRN
Status: DISCONTINUED | OUTPATIENT
Start: 2019-03-13 | End: 2019-03-15

## 2019-03-13 RX ORDER — FLUMAZENIL 0.1 MG/ML
0.2 INJECTION, SOLUTION INTRAVENOUS AS NEEDED
Status: DISCONTINUED | OUTPATIENT
Start: 2019-03-13 | End: 2019-03-15

## 2019-03-13 RX ADMIN — MIDAZOLAM HYDROCHLORIDE 0.5 MG: 1 INJECTION INTRAMUSCULAR; INTRAVENOUS at 08:59:00

## 2019-03-13 RX ADMIN — MIDAZOLAM HYDROCHLORIDE 0.5 MG: 1 INJECTION INTRAMUSCULAR; INTRAVENOUS at 09:08:00

## 2019-03-13 RX ADMIN — SODIUM CHLORIDE: 9 INJECTION, SOLUTION INTRAVENOUS at 08:56:00

## 2019-03-13 RX ADMIN — MIDAZOLAM HYDROCHLORIDE 1 MG: 1 INJECTION INTRAMUSCULAR; INTRAVENOUS at 08:30:00

## 2019-03-13 NOTE — IMAGING NOTE
Pt into ct room 1 for ct guided right lung biopsy. Pt informed and consented per Dr. Judith Cintron. Pt placed in supine  position, chest cleansed and prepped per . Pt has normal sine running to maintain IV patency per protocol.   Pt tolerated procedure

## 2019-03-13 NOTE — PROCEDURES
[facility]  Procedure Note    Lesli Schaefer Patient Status:  Outpatient    10/11/1960 MRN [id number]   [facility] CT Attending Tammie Purdy MD   Hosp Day # 0 PCP Vivienne Franco DO     Procedure: right upper lobe nodule    Pre-P

## 2019-03-14 ENCOUNTER — TELEPHONE (OUTPATIENT)
Dept: SURGERY | Facility: CLINIC | Age: 59
End: 2019-03-14

## 2019-03-14 ENCOUNTER — HOSPITAL ENCOUNTER (OUTPATIENT)
Dept: GENERAL RADIOLOGY | Facility: HOSPITAL | Age: 59
Discharge: HOME | End: 2019-03-14
Attending: RADIOLOGY
Payer: COMMERCIAL

## 2019-03-14 ENCOUNTER — TELEPHONE (OUTPATIENT)
Dept: HEMATOLOGY/ONCOLOGY | Facility: HOSPITAL | Age: 59
End: 2019-03-14

## 2019-03-14 DIAGNOSIS — K74.60 CIRRHOSIS OF LIVER WITHOUT ASCITES, UNSPECIFIED HEPATIC CIRRHOSIS TYPE (HCC): Primary | ICD-10-CM

## 2019-03-14 DIAGNOSIS — C34.91 PRIMARY LUNG SQUAMOUS CELL CARCINOMA, RIGHT (HCC): Primary | ICD-10-CM

## 2019-03-14 PROCEDURE — 71045 X-RAY EXAM CHEST 1 VIEW: CPT | Performed by: RADIOLOGY

## 2019-03-14 NOTE — TELEPHONE ENCOUNTER
Case discussed with Dr. Francheska Prince. Based on AFP and MRI liver findings he is felt to have infiltrative HCC. Now with lung lesion bx positive for NSCLC, squamous cell histology. Will obtain a PET/CT scan to stage NSCLC further.   Either way immunotherapy may

## 2019-03-14 NOTE — TELEPHONE ENCOUNTER
Discussed lung biopsy results with patient. Will have him get updated labs and see him on Monday. Discussed with Dr. Bradford Hammond as well who will have patient get PET-CT and follow up with him as well and then decide on best approach.

## 2019-03-16 ENCOUNTER — LAB ENCOUNTER (OUTPATIENT)
Dept: LAB | Age: 59
End: 2019-03-16
Attending: INTERNAL MEDICINE
Payer: COMMERCIAL

## 2019-03-16 DIAGNOSIS — K74.60 CIRRHOSIS OF LIVER WITHOUT ASCITES, UNSPECIFIED HEPATIC CIRRHOSIS TYPE (HCC): ICD-10-CM

## 2019-03-16 LAB
AFP-TM SERPL-MCNC: ABNORMAL NG/ML (ref ?–8)
ALBUMIN SERPL-MCNC: 2.9 G/DL (ref 3.4–5)
ALBUMIN/GLOB SERPL: 0.6 {RATIO} (ref 1–2)
ALP LIVER SERPL-CCNC: 184 U/L (ref 45–117)
ALT SERPL-CCNC: 49 U/L (ref 16–61)
ANION GAP SERPL CALC-SCNC: 6 MMOL/L (ref 0–18)
AST SERPL-CCNC: 140 U/L (ref 15–37)
BASOPHILS # BLD AUTO: 0.02 X10(3) UL (ref 0–0.2)
BASOPHILS NFR BLD AUTO: 0.8 %
BILIRUB SERPL-MCNC: 3.4 MG/DL (ref 0.1–2)
BUN BLD-MCNC: 12 MG/DL (ref 7–18)
BUN/CREAT SERPL: 16 (ref 10–20)
CALCIUM BLD-MCNC: 8.5 MG/DL (ref 8.5–10.1)
CHLORIDE SERPL-SCNC: 101 MMOL/L (ref 98–107)
CO2 SERPL-SCNC: 27 MMOL/L (ref 21–32)
CREAT BLD-MCNC: 0.75 MG/DL (ref 0.7–1.3)
DEPRECATED RDW RBC AUTO: 55.5 FL (ref 35.1–46.3)
EOSINOPHIL # BLD AUTO: 0.15 X10(3) UL (ref 0–0.7)
EOSINOPHIL NFR BLD AUTO: 5.7 %
ERYTHROCYTE [DISTWIDTH] IN BLOOD BY AUTOMATED COUNT: 15 % (ref 11–15)
GLOBULIN PLAS-MCNC: 4.9 G/DL (ref 2.8–4.4)
GLUCOSE BLD-MCNC: 163 MG/DL (ref 70–99)
HCT VFR BLD AUTO: 39.5 % (ref 39–53)
HGB BLD-MCNC: 14 G/DL (ref 13–17.5)
IMM GRANULOCYTES # BLD AUTO: 0.01 X10(3) UL (ref 0–1)
IMM GRANULOCYTES NFR BLD: 0.4 %
INR BLD: 1.42 (ref 0.9–1.1)
LYMPHOCYTES # BLD AUTO: 0.7 X10(3) UL (ref 1–4)
LYMPHOCYTES NFR BLD AUTO: 26.4 %
M PROTEIN MFR SERPL ELPH: 7.8 G/DL (ref 6.4–8.2)
MCH RBC QN AUTO: 35.4 PG (ref 26–34)
MCHC RBC AUTO-ENTMCNC: 35.4 G/DL (ref 31–37)
MCV RBC AUTO: 99.7 FL (ref 80–100)
MONOCYTES # BLD AUTO: 0.18 X10(3) UL (ref 0.1–1)
MONOCYTES NFR BLD AUTO: 6.8 %
NEUTROPHILS # BLD AUTO: 1.59 X10 (3) UL (ref 1.5–7.7)
NEUTROPHILS # BLD AUTO: 1.59 X10(3) UL (ref 1.5–7.7)
NEUTROPHILS NFR BLD AUTO: 59.9 %
OSMOLALITY SERPL CALC.SUM OF ELEC: 281 MOSM/KG (ref 275–295)
PLATELET # BLD AUTO: 82 10(3)UL (ref 150–450)
POTASSIUM SERPL-SCNC: 4.1 MMOL/L (ref 3.5–5.1)
PSA SERPL DL<=0.01 NG/ML-MCNC: 17.7 SECONDS (ref 12.2–14.4)
RBC # BLD AUTO: 3.96 X10(6)UL (ref 4.3–5.7)
SODIUM SERPL-SCNC: 134 MMOL/L (ref 136–145)
WBC # BLD AUTO: 2.7 X10(3) UL (ref 4–11)

## 2019-03-16 PROCEDURE — 36415 COLL VENOUS BLD VENIPUNCTURE: CPT

## 2019-03-16 PROCEDURE — 85610 PROTHROMBIN TIME: CPT

## 2019-03-16 PROCEDURE — 80053 COMPREHEN METABOLIC PANEL: CPT

## 2019-03-16 PROCEDURE — 85025 COMPLETE CBC W/AUTO DIFF WBC: CPT

## 2019-03-16 PROCEDURE — 82105 ALPHA-FETOPROTEIN SERUM: CPT

## 2019-03-18 NOTE — PROGRESS NOTES
[facility]  [address], [address] [facility] [address]  [address]  [phone] ([phone]) 2018      Family History   Problem Relation Age of Onset   • Cancer Neg    • Blood Disorder Neg       Social History    Tobacco Use      Smoking status: Current Every Day Smoker        Packs/day: 1.00        Years: 45.00        Pack years: 45        Types: rapidly rising AFP, do not think a good candidate for liver directed therapy at this point. Have discussed with Oncology for consideration of nivolumab to address [address] and potentially lung CA (PD-L1 staining pending).   - Discussed with patient that he is no

## 2019-03-19 LAB — ADEQUACY OF SPECIMEN: ADEQUATE

## 2019-03-20 ENCOUNTER — SOCIAL WORK SERVICES (OUTPATIENT)
Dept: HEMATOLOGY/ONCOLOGY | Facility: HOSPITAL | Age: 59
End: 2019-03-20

## 2019-03-20 PROBLEM — C22.0 HEPATOCELLULAR CARCINOMA (HCC): Status: ACTIVE | Noted: 2019-03-20

## 2019-03-20 NOTE — PROGRESS NOTES
At MD's request, ANDRES called and spoke to patient's significant other Angelika Martin. Offered information on SW support available. She advises they don't need any assistance at this time, but she took SW contact information.   Encouraged them to ask to see SW

## 2019-03-25 ENCOUNTER — HOSPITAL ENCOUNTER (OUTPATIENT)
Dept: NUCLEAR MEDICINE | Facility: HOSPITAL | Age: 59
Discharge: HOME | End: 2019-03-25
Attending: INTERNAL MEDICINE
Payer: COMMERCIAL

## 2019-03-25 DIAGNOSIS — C34.91 PRIMARY LUNG SQUAMOUS CELL CARCINOMA, RIGHT (HCC): ICD-10-CM

## 2019-03-25 LAB — GLUCOSE BLD-MCNC: 92 MG/DL (ref 70–99)

## 2019-03-25 PROCEDURE — 82962 GLUCOSE BLOOD TEST: CPT

## 2019-03-25 PROCEDURE — 78815 PET IMAGE W/CT SKULL-THIGH: CPT | Performed by: INTERNAL MEDICINE

## 2019-03-27 ENCOUNTER — OFFICE VISIT (OUTPATIENT)
Dept: HEMATOLOGY/ONCOLOGY | Facility: HOSPITAL | Age: 59
End: 2019-03-27
Attending: INTERNAL MEDICINE
Payer: COMMERCIAL

## 2019-03-27 VITALS
SYSTOLIC BLOOD PRESSURE: 132 MMHG | HEART RATE: 86 BPM | TEMPERATURE: 98 F | HEIGHT: 72.01 IN | DIASTOLIC BLOOD PRESSURE: 78 MMHG | OXYGEN SATURATION: 95 % | BODY MASS INDEX: 35.83 KG/M2 | WEIGHT: 264.5 LBS | RESPIRATION RATE: 20 BRPM

## 2019-03-27 DIAGNOSIS — C22.0 HEPATOCELLULAR CARCINOMA (HCC): Primary | ICD-10-CM

## 2019-03-27 DIAGNOSIS — R10.10 PAIN OF UPPER ABDOMEN: ICD-10-CM

## 2019-03-27 DIAGNOSIS — D69.6 THROMBOCYTOPENIA (HCC): ICD-10-CM

## 2019-03-27 DIAGNOSIS — C34.91 PRIMARY LUNG SQUAMOUS CELL CARCINOMA, RIGHT (HCC): ICD-10-CM

## 2019-03-27 DIAGNOSIS — R18.8 ASCITES OF LIVER: ICD-10-CM

## 2019-03-27 DIAGNOSIS — B18.2 CHRONIC HEPATITIS C WITH CIRRHOSIS (HCC): ICD-10-CM

## 2019-03-27 DIAGNOSIS — K74.60 CHRONIC HEPATITIS C WITH CIRRHOSIS (HCC): ICD-10-CM

## 2019-03-27 DIAGNOSIS — D72.818 OTHER DECREASED WHITE BLOOD CELL (WBC) COUNT: ICD-10-CM

## 2019-03-27 LAB
AFP-TM SERPL-MCNC: ABNORMAL NG/ML (ref ?–8)
ALBUMIN SERPL-MCNC: 2.9 G/DL (ref 3.4–5)
ALBUMIN/GLOB SERPL: 0.6 {RATIO} (ref 1–2)
ALP LIVER SERPL-CCNC: 197 U/L (ref 45–117)
ALT SERPL-CCNC: 51 U/L (ref 16–61)
ANION GAP SERPL CALC-SCNC: 8 MMOL/L (ref 0–18)
AST SERPL-CCNC: 158 U/L (ref 15–37)
BASOPHILS # BLD AUTO: 0.04 X10(3) UL (ref 0–0.2)
BASOPHILS NFR BLD AUTO: 0.8 %
BILIRUB SERPL-MCNC: 3.5 MG/DL (ref 0.1–2)
BUN BLD-MCNC: 13 MG/DL (ref 7–18)
BUN/CREAT SERPL: 15.9 (ref 10–20)
CALCIUM BLD-MCNC: 8.6 MG/DL (ref 8.5–10.1)
CHLORIDE SERPL-SCNC: 99 MMOL/L (ref 98–107)
CO2 SERPL-SCNC: 26 MMOL/L (ref 21–32)
CREAT BLD-MCNC: 0.82 MG/DL (ref 0.7–1.3)
DEPRECATED RDW RBC AUTO: 54.8 FL (ref 35.1–46.3)
EOSINOPHIL # BLD AUTO: 0.22 X10(3) UL (ref 0–0.7)
EOSINOPHIL NFR BLD AUTO: 4.2 %
ERYTHROCYTE [DISTWIDTH] IN BLOOD BY AUTOMATED COUNT: 15.6 % (ref 11–15)
GLOBULIN PLAS-MCNC: 5.1 G/DL (ref 2.8–4.4)
GLUCOSE BLD-MCNC: 101 MG/DL (ref 70–99)
HCT VFR BLD AUTO: 38.5 % (ref 39–53)
HGB BLD-MCNC: 14.3 G/DL (ref 13–17.5)
IMM GRANULOCYTES # BLD AUTO: 0.01 X10(3) UL (ref 0–1)
IMM GRANULOCYTES NFR BLD: 0.2 %
INR BLD: 1.51 (ref 0.9–1.1)
LYMPHOCYTES # BLD AUTO: 0.85 X10(3) UL (ref 1–4)
LYMPHOCYTES NFR BLD AUTO: 16.2 %
M PROTEIN MFR SERPL ELPH: 8 G/DL (ref 6.4–8.2)
MCH RBC QN AUTO: 35.8 PG (ref 26–34)
MCHC RBC AUTO-ENTMCNC: 37.1 G/DL (ref 31–37)
MCV RBC AUTO: 96.5 FL (ref 80–100)
MONOCYTES # BLD AUTO: 0.78 X10(3) UL (ref 0.1–1)
MONOCYTES NFR BLD AUTO: 14.9 %
NEUTROPHILS # BLD AUTO: 3.34 X10 (3) UL (ref 1.5–7.7)
NEUTROPHILS # BLD AUTO: 3.34 X10(3) UL (ref 1.5–7.7)
NEUTROPHILS NFR BLD AUTO: 63.7 %
OSMOLALITY SERPL CALC.SUM OF ELEC: 276 MOSM/KG (ref 275–295)
PLATELET # BLD AUTO: 85 10(3)UL (ref 150–450)
POTASSIUM SERPL-SCNC: 3.9 MMOL/L (ref 3.5–5.1)
PSA SERPL DL<=0.01 NG/ML-MCNC: 19 SECONDS (ref 12.5–14.7)
RBC # BLD AUTO: 3.99 X10(6)UL (ref 4.3–5.7)
SODIUM SERPL-SCNC: 133 MMOL/L (ref 136–145)
TSI SER-ACNC: 3.59 MIU/ML (ref 0.36–3.74)
WBC # BLD AUTO: 5.2 X10(3) UL (ref 4–11)

## 2019-03-27 PROCEDURE — 99215 OFFICE O/P EST HI 40 MIN: CPT | Performed by: INTERNAL MEDICINE

## 2019-03-27 NOTE — PROGRESS NOTES
Pt here for MD f/reilly to discuss PET scan results and POC. pt states he has some abd pain and stomach bloating. He believes he is constipated.

## 2019-03-27 NOTE — PROGRESS NOTES
Hematology/Oncology Clinic Follow Up Visit    Patient Name: Haleigh Birmingham Record Number: [id number]    YOB: 1960    PCP: Dr. Sylvain Manning   Other providers: Dr. Shawn Sellers (liver)    Reason for Consultation:  Jen Bruce increased FDG uptake to SUV 11.2. Subcm mediastinal LNs without elevated FDG uptake. No other malignant appearing FDG uptake.   Note of new large amt of ascites with increase in size of collateral vessels in the upper abdomen, and the size of the liver an Thrombocytopenia ([facility] [address] 8/22/2017   • Unspecified essential hypertension    • Visual impairment     glasses     Past Surgical History:   Procedure Laterality Date   • BIOPSY OF SKIN LESION      face and leg   • IR VARICOSE VEIN ENDOVENOUS LASER ABLATION(CPT and negative per the HPI    Vital Signs:  Height: 182.9 cm (6' 0.01\") (03/27 1515)  Weight: 120 kg (264 lb 8 oz) (03/27 1515)  BSA (Calculated - sq m): 2.4 sq meters (03/27 1515)  Pulse: 86 (03/27 1515)  BP: 132/78 (03/27 1515)  Temp: 98.2 °F (36.8 °C) (0 ALB 2.9 (L) 03/27/2019    GLOBULT 3.4 06/29/2013    GLOBULIN 5.1 (H) 03/27/2019    ALBGLOBRAT 1.1 06/29/2013     (L) 03/27/2019    K 3.9 03/27/2019    CL 99 03/27/2019    CO2 26.0 03/27/2019     Lab Results   Component Value Date    INR 1.51 (H) 03/2 exclude any portal vein or splanchnic vessel thrombosis as a contributing cause  -will discuss possible therapeutic paracentesis when reassess in clinic next week  -avoid etoh  -Follow with Dr. Freda Durham    *NSCLC, squamous cell type  -clinical stage T1N0.   Tatiana Guevara

## 2019-03-28 ENCOUNTER — HOSPITAL ENCOUNTER (OUTPATIENT)
Dept: ULTRASOUND IMAGING | Facility: HOSPITAL | Age: 59
Discharge: HOME | End: 2019-03-28
Attending: INTERNAL MEDICINE
Payer: COMMERCIAL

## 2019-03-28 ENCOUNTER — OFFICE VISIT (OUTPATIENT)
Dept: HEMATOLOGY/ONCOLOGY | Age: 59
End: 2019-03-28
Attending: INTERNAL MEDICINE
Payer: COMMERCIAL

## 2019-03-28 ENCOUNTER — TELEPHONE (OUTPATIENT)
Dept: HEMATOLOGY/ONCOLOGY | Facility: HOSPITAL | Age: 59
End: 2019-03-28

## 2019-03-28 VITALS
DIASTOLIC BLOOD PRESSURE: 85 MMHG | SYSTOLIC BLOOD PRESSURE: 124 MMHG | WEIGHT: 265 LBS | HEART RATE: 80 BPM | HEIGHT: 72.05 IN | BODY MASS INDEX: 35.89 KG/M2 | TEMPERATURE: 98 F | RESPIRATION RATE: 18 BRPM | OXYGEN SATURATION: 97 %

## 2019-03-28 DIAGNOSIS — Z71.9 ENCOUNTER FOR HEALTH EDUCATION: ICD-10-CM

## 2019-03-28 DIAGNOSIS — C22.0 HEPATOCELLULAR CARCINOMA (HCC): Primary | ICD-10-CM

## 2019-03-28 DIAGNOSIS — C34.91 PRIMARY LUNG SQUAMOUS CELL CARCINOMA, RIGHT (HCC): ICD-10-CM

## 2019-03-28 DIAGNOSIS — R18.8 ASCITES OF LIVER: ICD-10-CM

## 2019-03-28 DIAGNOSIS — C22.0 HEPATOCELLULAR CARCINOMA (HCC): ICD-10-CM

## 2019-03-28 DIAGNOSIS — R10.10 PAIN OF UPPER ABDOMEN: ICD-10-CM

## 2019-03-28 DIAGNOSIS — C34.91 PRIMARY LUNG SQUAMOUS CELL CARCINOMA, RIGHT (HCC): Primary | ICD-10-CM

## 2019-03-28 PROBLEM — D72.819 LEUKOPENIA: Status: ACTIVE | Noted: 2019-03-28

## 2019-03-28 PROBLEM — R16.0 LIVER MASSES: Status: RESOLVED | Noted: 2019-02-14 | Resolved: 2019-03-28

## 2019-03-28 PROCEDURE — 96413 CHEMO IV INFUSION 1 HR: CPT

## 2019-03-28 PROCEDURE — 76700 US EXAM ABDOM COMPLETE: CPT | Performed by: INTERNAL MEDICINE

## 2019-03-28 PROCEDURE — 99215 OFFICE O/P EST HI 40 MIN: CPT | Performed by: CLINICAL NURSE SPECIALIST

## 2019-03-28 PROCEDURE — 93975 VASCULAR STUDY: CPT | Performed by: INTERNAL MEDICINE

## 2019-03-28 NOTE — PROGRESS NOTES
Pt here for C1D1 Opdivo. Arrives Ambulating independently and Ambulating with cane, accompanied by Spouse           Modifications in dose or schedule: No. Verified consent signed per APN and pt.      Frequency of blood return and site check throughout admi

## 2019-03-28 NOTE — PROGRESS NOTES
Immunotherapy  Education    Learner:  Patient and Spouse    Barriers / Limitations:  Cognitive limitations    Immunotherapy  education goals:  · Learn the drug names  · Administration schedule  · Routes of administration  · Treatment setting    Drug names: total of 45 minutes with the patient, 100 % of that time was spent counseling patient regarding the above documented side effects and management, when to call provider and contact information.      Sixto Hairston ANP-BC  Nurse Practitioner  Kee Ryan

## 2019-03-28 NOTE — TELEPHONE ENCOUNTER
Daniel Perrin MD  P Edw Ricardo Reid Rns             Please call pt and advise that no blood clot was found on his ultrasound.        These results were given to pt while in treatment area by MINAL Willis.

## 2019-03-29 ENCOUNTER — TELEPHONE (OUTPATIENT)
Dept: SURGERY | Facility: HOSPITAL | Age: 59
End: 2019-03-29

## 2019-03-29 ENCOUNTER — TELEPHONE (OUTPATIENT)
Dept: HEMATOLOGY/ONCOLOGY | Facility: HOSPITAL | Age: 59
End: 2019-03-29

## 2019-03-29 NOTE — TELEPHONE ENCOUNTER
Date of Treatment:     3/28/19                            Type of Immunotherapy: Opdivo    Comments: LM for patient on home number. Recommendations: general message to call with any issues or questions related to infusion.   Confirmed next appointment sc

## 2019-03-29 NOTE — TELEPHONE ENCOUNTER
Patient also told to stop taking baby aspirin and that there is availability on the schedule next Thursday for an appointment.

## 2019-03-29 NOTE — TELEPHONE ENCOUNTER
Called patient to offer ordering a paracentesis to drain excess fluid from abdomen. Asked patient to give me a call in the office or on my cell phone (numbers provided) so that we can discuss further.   Also asked patient to call to schedule a follow-up ap

## 2019-03-31 ENCOUNTER — TELEPHONE (OUTPATIENT)
Dept: SURGERY | Facility: HOSPITAL | Age: 59
End: 2019-03-31

## 2019-03-31 DIAGNOSIS — K74.69 DECOMPENSATED HCV CIRRHOSIS (HCC): Primary | ICD-10-CM

## 2019-03-31 DIAGNOSIS — B19.20 DECOMPENSATED HCV CIRRHOSIS (HCC): Primary | ICD-10-CM

## 2019-04-01 ENCOUNTER — DIETICIAN VISIT (OUTPATIENT)
Dept: HEMATOLOGY/ONCOLOGY | Facility: HOSPITAL | Age: 59
End: 2019-04-01

## 2019-04-01 NOTE — TELEPHONE ENCOUNTER
Patient returned call indicating that he would like order for paracentesis. Order was placed. Patient reminded to call office on Monday to make appointment.

## 2019-04-02 ENCOUNTER — TELEPHONE (OUTPATIENT)
Dept: HEMATOLOGY/ONCOLOGY | Facility: HOSPITAL | Age: 59
End: 2019-04-02

## 2019-04-02 ENCOUNTER — TELEPHONE (OUTPATIENT)
Dept: SURGERY | Facility: HOSPITAL | Age: 59
End: 2019-04-02

## 2019-04-02 NOTE — TELEPHONE ENCOUNTER
Shirley Mon called M asking if he can take tums for gas pains being on therapy. Called Shirley Mon back at number provided and M that ok to take tums for gas pains or can take gasX OTC if for mild discomfort from gas pain.  If pain is uncontrolled or worse he anselmo

## 2019-04-02 NOTE — TELEPHONE ENCOUNTER
Followed up on a call from patient's wife who called cell phone asking if it was ok for patient to take Tums for stomach gas. Initially told her I thought it would be Ok from a liver standpoint.   After reviewing chart, called home number and spoke to lynne

## 2019-04-04 ENCOUNTER — HOSPITAL ENCOUNTER (OUTPATIENT)
Dept: ULTRASOUND IMAGING | Facility: HOSPITAL | Age: 59
Discharge: HOME | End: 2019-04-04
Attending: INTERNAL MEDICINE
Payer: COMMERCIAL

## 2019-04-04 ENCOUNTER — APPOINTMENT (OUTPATIENT)
Dept: LAB | Facility: HOSPITAL | Age: 59
End: 2019-04-04
Attending: RADIOLOGY
Payer: COMMERCIAL

## 2019-04-04 VITALS
OXYGEN SATURATION: 98 % | WEIGHT: 265 LBS | BODY MASS INDEX: 35.89 KG/M2 | HEART RATE: 75 BPM | RESPIRATION RATE: 14 BRPM | HEIGHT: 72 IN | TEMPERATURE: 98 F | SYSTOLIC BLOOD PRESSURE: 119 MMHG | DIASTOLIC BLOOD PRESSURE: 63 MMHG

## 2019-04-04 DIAGNOSIS — K74.69 DECOMPENSATED HCV CIRRHOSIS (HCC): ICD-10-CM

## 2019-04-04 DIAGNOSIS — B19.20 DECOMPENSATED HCV CIRRHOSIS (HCC): ICD-10-CM

## 2019-04-04 DIAGNOSIS — K74.69 CIRRHOSIS, CRYPTOGENIC (HCC): Primary | ICD-10-CM

## 2019-04-04 DIAGNOSIS — K74.69 CIRRHOSIS, CRYPTOGENIC (HCC): ICD-10-CM

## 2019-04-04 PROCEDURE — P9047 ALBUMIN (HUMAN), 25%, 50ML: HCPCS

## 2019-04-04 PROCEDURE — 49083 ABD PARACENTESIS W/IMAGING: CPT | Performed by: INTERNAL MEDICINE

## 2019-04-04 PROCEDURE — P9047 ALBUMIN (HUMAN), 25%, 50ML: HCPCS | Performed by: INTERNAL MEDICINE

## 2019-04-04 PROCEDURE — 36415 COLL VENOUS BLD VENIPUNCTURE: CPT

## 2019-04-04 PROCEDURE — 85610 PROTHROMBIN TIME: CPT

## 2019-04-04 RX ORDER — MORPHINE SULFATE 2 MG/ML
2 INJECTION, SOLUTION INTRAMUSCULAR; INTRAVENOUS EVERY 2 HOUR PRN
Status: DISCONTINUED | OUTPATIENT
Start: 2019-04-04 | End: 2019-04-06

## 2019-04-04 RX ORDER — ALBUMIN (HUMAN) 12.5 G/50ML
SOLUTION INTRAVENOUS
Status: DISPENSED
Start: 2019-04-04 | End: 2019-04-04

## 2019-04-04 RX ORDER — ALBUMIN (HUMAN) 12.5 G/50ML
50 SOLUTION INTRAVENOUS ONCE
Status: COMPLETED | OUTPATIENT
Start: 2019-04-04 | End: 2019-04-04

## 2019-04-04 RX ORDER — ACETAMINOPHEN 325 MG/1
650 TABLET ORAL EVERY 6 HOURS PRN
Status: DISCONTINUED | OUTPATIENT
Start: 2019-04-04 | End: 2019-04-06

## 2019-04-04 RX ORDER — SODIUM CHLORIDE 9 MG/ML
INJECTION, SOLUTION INTRAVENOUS CONTINUOUS
Status: DISCONTINUED | OUTPATIENT
Start: 2019-04-04 | End: 2019-04-06

## 2019-04-04 RX ORDER — HYDROCODONE BITARTRATE AND ACETAMINOPHEN 5; 325 MG/1; MG/1
1 TABLET ORAL EVERY 4 HOURS PRN
Status: DISCONTINUED | OUTPATIENT
Start: 2019-04-04 | End: 2019-04-06

## 2019-04-04 RX ADMIN — ALBUMIN (HUMAN) 50 G: 12.5 SOLUTION INTRAVENOUS at 11:20:00

## 2019-04-04 NOTE — IMAGING NOTE
Pt tolerated R sided paracentesis procedure well. VSS on room air. Presently denies pain, band aid dressing is CDI. Pt updated on procedure and plan of care. Report called to [facility] RN and awaiting transport to  # [phone] with family at bedside.

## 2019-04-04 NOTE — PROCEDURES
[facility]  Procedure Note    Mignon Adame Patient Status:  Outpatient    10/11/1960 MRN [id number]   Location [address] [place] [address] Attending ARVIND Boyd   Hosp Day # 0 PCP Robert Britton DO     LOCATION: Right mid abdomen  FLUID

## 2019-04-05 ENCOUNTER — OFFICE VISIT (OUTPATIENT)
Dept: HEMATOLOGY/ONCOLOGY | Age: 59
End: 2019-04-05
Attending: INTERNAL MEDICINE
Payer: COMMERCIAL

## 2019-04-05 VITALS
RESPIRATION RATE: 20 BRPM | BODY MASS INDEX: 34 KG/M2 | WEIGHT: 254 LBS | SYSTOLIC BLOOD PRESSURE: 117 MMHG | HEART RATE: 71 BPM | DIASTOLIC BLOOD PRESSURE: 72 MMHG | TEMPERATURE: 97 F | OXYGEN SATURATION: 96 %

## 2019-04-05 DIAGNOSIS — K74.60 CHRONIC HEPATITIS C WITH CIRRHOSIS (HCC): ICD-10-CM

## 2019-04-05 DIAGNOSIS — D72.818 OTHER DECREASED WHITE BLOOD CELL (WBC) COUNT: ICD-10-CM

## 2019-04-05 DIAGNOSIS — R79.89 ELEVATED LIVER FUNCTION TESTS: ICD-10-CM

## 2019-04-05 DIAGNOSIS — B18.2 CHRONIC HEPATITIS C WITH CIRRHOSIS (HCC): ICD-10-CM

## 2019-04-05 DIAGNOSIS — C22.0 HEPATOCELLULAR CARCINOMA (HCC): Primary | ICD-10-CM

## 2019-04-05 DIAGNOSIS — C34.91 PRIMARY LUNG SQUAMOUS CELL CARCINOMA, RIGHT (HCC): ICD-10-CM

## 2019-04-05 DIAGNOSIS — D69.6 THROMBOCYTOPENIA (HCC): ICD-10-CM

## 2019-04-05 PROCEDURE — 99215 OFFICE O/P EST HI 40 MIN: CPT | Performed by: INTERNAL MEDICINE

## 2019-04-05 NOTE — PROGRESS NOTES
Hematology/Oncology Clinic Follow Up Visit    Patient Name: Haleigh Birmingham Record Number: [id number]    YOB: 1960    PCP: Dr. Genevieve Stark   Other providers: Dr. Ankita Diggs (liver)    Reason for Consultation:  Iman mendez 31-40%  -3/25/19- PET/CT- RUL lung mass with increased FDG uptake to SUV 11.2. Subcm mediastinal LNs without elevated FDG uptake. No other malignant appearing FDG uptake.   Note of new large amt of ascites with increase in size of collateral vessels in th Oral Tab Take 1 tablet (40 mg total) by mouth 2 (two) times daily. Disp: 30 tablet Rfl: 0   spironolactone (ALDACTONE) 50 MG Oral Tab Take 1 tablet (50 mg total) by mouth daily.  Disp: 30 tablet Rfl: 3   traMADol HCl 50 MG Oral Tab Take 1-2 tablets ( 120.2 kg (265 lb)  03/28/19 : 120.2 kg (265 lb)  03/27/19 : 120 kg (264 lb 8 oz)  03/18/19 : 117.5 kg (259 lb)  03/04/19 : 116.1 kg (256 lb)    ECOG PS: 2    Physical Examination:  General: Patient is alert and oriented, not in acute distress  Psych: Mood Plan:     *Infiltrative hepatocellular carcinoma, unresectable  -started first line nivolumab with palliative intent on 3/28/19 given simultaneous diagnosis of both liver and lung cancers and poor candidate for TKIs given underlying liver dysfunction.   -r

## 2019-04-05 NOTE — PROGRESS NOTES
Outpatient Oncology Care Plan  Problem list:  knowledge deficit  nausea and vomiting  gas/bloating    Problems related to:    disease/disease progression    Interventions:  emotional support given  provided general teaching    Expected outcomes:  Nic Kay

## 2019-04-10 ENCOUNTER — TELEPHONE (OUTPATIENT)
Dept: SURGERY | Facility: CLINIC | Age: 59
End: 2019-04-10

## 2019-04-10 ENCOUNTER — SOCIAL WORK SERVICES (OUTPATIENT)
Dept: HEMATOLOGY/ONCOLOGY | Facility: HOSPITAL | Age: 59
End: 2019-04-10

## 2019-04-10 NOTE — TELEPHONE ENCOUNTER
Called patient to follow-up after having paracentesis. He reported having para last Thursday 4/4/19.  Feels much better now. Carol Johnson not think he needs another one yet.  He indicated confirming his upcoming appointment with the office.     Yaya Ramirez, ARVIND

## 2019-04-11 DIAGNOSIS — K70.31 ALCOHOLIC CIRRHOSIS OF LIVER WITH ASCITES (HCC): ICD-10-CM

## 2019-04-11 RX ORDER — FUROSEMIDE 40 MG/1
40 TABLET ORAL 2 TIMES DAILY
Qty: 60 TABLET | Refills: 6 | Status: ON HOLD | OUTPATIENT
Start: 2019-04-11 | End: 2021-01-01

## 2019-04-12 ENCOUNTER — APPOINTMENT (OUTPATIENT)
Dept: HEMATOLOGY/ONCOLOGY | Age: 59
End: 2019-04-12
Attending: INTERNAL MEDICINE
Payer: COMMERCIAL

## 2019-04-12 ENCOUNTER — OFFICE VISIT (OUTPATIENT)
Dept: HEMATOLOGY/ONCOLOGY | Age: 59
End: 2019-04-12
Attending: INTERNAL MEDICINE
Payer: COMMERCIAL

## 2019-04-12 VITALS
DIASTOLIC BLOOD PRESSURE: 76 MMHG | OXYGEN SATURATION: 96 % | BODY MASS INDEX: 36 KG/M2 | WEIGHT: 263.5 LBS | TEMPERATURE: 98 F | SYSTOLIC BLOOD PRESSURE: 119 MMHG | HEART RATE: 68 BPM | RESPIRATION RATE: 18 BRPM

## 2019-04-12 DIAGNOSIS — K74.60 CHRONIC HEPATITIS C WITH CIRRHOSIS (HCC): ICD-10-CM

## 2019-04-12 DIAGNOSIS — B18.2 CHRONIC HEPATITIS C WITH CIRRHOSIS (HCC): ICD-10-CM

## 2019-04-12 DIAGNOSIS — C34.91 PRIMARY LUNG SQUAMOUS CELL CARCINOMA, RIGHT (HCC): ICD-10-CM

## 2019-04-12 DIAGNOSIS — K59.09 OTHER CONSTIPATION: ICD-10-CM

## 2019-04-12 DIAGNOSIS — C22.0 HEPATOCELLULAR CARCINOMA (HCC): Primary | ICD-10-CM

## 2019-04-12 DIAGNOSIS — D61.818 PANCYTOPENIA (HCC): ICD-10-CM

## 2019-04-12 DIAGNOSIS — D69.6 THROMBOCYTOPENIA (HCC): ICD-10-CM

## 2019-04-12 DIAGNOSIS — R18.8 POORLY CONTROLLED ASCITES: ICD-10-CM

## 2019-04-12 DIAGNOSIS — D72.818 OTHER DECREASED WHITE BLOOD CELL (WBC) COUNT: ICD-10-CM

## 2019-04-12 DIAGNOSIS — R60.0 BILATERAL LOWER EXTREMITY EDEMA: ICD-10-CM

## 2019-04-12 DIAGNOSIS — R79.89 ELEVATED LIVER FUNCTION TESTS: ICD-10-CM

## 2019-04-12 PROBLEM — R91.8 MASS OF UPPER LOBE OF RIGHT LUNG: Status: RESOLVED | Noted: 2019-02-14 | Resolved: 2019-04-12

## 2019-04-12 PROCEDURE — 99215 OFFICE O/P EST HI 40 MIN: CPT | Performed by: INTERNAL MEDICINE

## 2019-04-12 PROCEDURE — 96413 CHEMO IV INFUSION 1 HR: CPT

## 2019-04-12 NOTE — PROGRESS NOTES
Hematology/Oncology Clinic Follow Up Visit    Patient Name: Haleigh Birmingham Record Number: [id number]    YOB: 1960    PCP: Dr. Alejo Dillon   Other providers: Dr. Eric Alcantara (liver)    Reason for Consultation:  Fior mendez core biopsy of RUL nodule-allergy consistent with squamous cell carcinoma. PD-L1+, TPS 31-40%  -3/25/19- PET/CT- RUL lung mass with increased FDG uptake to SUV 11.2. Subcm mediastinal LNs without elevated FDG uptake.   No other malignant appearing FDG upta 8/22/2017   • Thrombocytopenia ([facility] [address] 8/22/2017   • Unspecified essential hypertension    • Visual impairment     glasses     Past Surgical History:   Procedure Laterality Date   • BIOPSY OF SKIN LESION      face and leg   • IR VARICOSE VEIN ENDOVENOUS LASE with pertinent positives and negative per the HPI    Vital Signs:  Height: --  Weight: 119.5 kg (263 lb 8 oz) (04/12 1032)  BSA (Calculated - sq m): --  Pulse: 68 (04/12 1032)  BP: 119/76 (04/12 1032)  Temp: 98.1 °F (36.7 °C) (04/12 1032)  Do Not Use - Res GLOBULT 3.4 06/29/2013    GLOBULIN 4.6 (H) 04/12/2019    ALBGLOBRAT 1.1 06/29/2013     (L) 04/12/2019    K 4.5 04/12/2019     04/12/2019    CO2 27.0 04/12/2019     Lab Results   Component Value Date    INR 1.57 (H) 04/12/2019     Lab Results prn    *lower abd gas pain  -cont simethicone prn    RTC in 2 weeks    Micaela De León MD  Hematology/Medical [address] [facility]

## 2019-04-12 NOTE — PROGRESS NOTES
Outpatient Oncology Care Plan  Problem list:  loss of appetite  fatigue  knowledge deficit    Problems related to:    disease/disease progression  side effect of treatment    Interventions:  emotional support given  provided general teaching    Expected ou

## 2019-04-12 NOTE — PROGRESS NOTES
Pt here for C2D1.   Arrives Ambulating independently, accompanied by Spouse           Modifications in dose or schedule: No     Frequency of blood return and site check throughout administration: Prior to administration   Discharged to Home, Ambulating inde

## 2019-04-14 NOTE — PROGRESS NOTES
[facility]  [address] [facility], [address]  [address]  [facility] [facility]  [phone] ([phone]) pack year h/o smoking    • Elevated liver function tests 7/24/2013   • Family history of bladder cancer 9/23/2014   • Hepatocellular carcinoma ([facility] 3/20/2019   • High blood pressure    • Hypoalbuminemia due to protein-calorie malnutrition ([facility] 8/22/2017 Pack years: 45        Types: Cigarettes        Quit date: 2019        Years since quittin.1      Smokeless tobacco: Former User        Types: Snuff      Tobacco comment: 2ppd for 20yrs, 1ppd for 10 years, 10 cigs per day for the last few years ARVIND Good  Nurse Practitioner  [facility] of [address]  [address] [address], 7th floor, [address], [address] [place] [address] (office)  [phone] (fax)  [email or web address]

## 2019-04-15 ENCOUNTER — SOCIAL WORK SERVICES (OUTPATIENT)
Dept: HEMATOLOGY/ONCOLOGY | Facility: HOSPITAL | Age: 59
End: 2019-04-15

## 2019-04-15 NOTE — PROGRESS NOTES
SW received message from patient that his work states they did not receive fax. SW faxed paperwork on 4/10. At that time confirmation that fax went through. SW faxed again today the paperwork and emailed patient's wife a copy.

## 2019-04-17 ENCOUNTER — APPOINTMENT (OUTPATIENT)
Dept: LAB | Facility: HOSPITAL | Age: 59
End: 2019-04-17
Attending: RADIOLOGY
Payer: COMMERCIAL

## 2019-04-17 DIAGNOSIS — K70.31 ALCOHOLIC CIRRHOSIS OF LIVER WITH ASCITES (HCC): ICD-10-CM

## 2019-04-17 PROCEDURE — 85610 PROTHROMBIN TIME: CPT

## 2019-04-17 PROCEDURE — 36415 COLL VENOUS BLD VENIPUNCTURE: CPT

## 2019-04-17 NOTE — PROCEDURES
[facility]  Procedure Note    [employer] Patient Status:  Outpatient    10/11/1960 MRN [id number]   Location [address] [place] [address] Attending Gómez Alvares, [address] [facility] Day # 0 CELSO Harris DO     Procedure: paracentesis    Pre-Proc

## 2019-04-25 ENCOUNTER — SOCIAL WORK SERVICES (OUTPATIENT)
Dept: HEMATOLOGY/ONCOLOGY | Facility: HOSPITAL | Age: 59
End: 2019-04-25

## 2019-04-25 NOTE — PROGRESS NOTES
Medical records from diagnosis faxed to [place] [facility], Disability Determination, [phone].

## 2019-04-26 ENCOUNTER — OFFICE VISIT (OUTPATIENT)
Dept: HEMATOLOGY/ONCOLOGY | Age: 59
End: 2019-04-26
Attending: INTERNAL MEDICINE
Payer: COMMERCIAL

## 2019-04-26 VITALS
TEMPERATURE: 98 F | HEART RATE: 65 BPM | DIASTOLIC BLOOD PRESSURE: 67 MMHG | SYSTOLIC BLOOD PRESSURE: 109 MMHG | WEIGHT: 235.31 LBS | RESPIRATION RATE: 18 BRPM | OXYGEN SATURATION: 98 % | BODY MASS INDEX: 32 KG/M2

## 2019-04-26 DIAGNOSIS — C34.91 PRIMARY LUNG SQUAMOUS CELL CARCINOMA, RIGHT (HCC): ICD-10-CM

## 2019-04-26 DIAGNOSIS — K59.09 OTHER CONSTIPATION: ICD-10-CM

## 2019-04-26 DIAGNOSIS — B18.2 CHRONIC HEPATITIS C WITH CIRRHOSIS (HCC): ICD-10-CM

## 2019-04-26 DIAGNOSIS — D72.818 OTHER DECREASED WHITE BLOOD CELL (WBC) COUNT: ICD-10-CM

## 2019-04-26 DIAGNOSIS — D61.818 PANCYTOPENIA (HCC): ICD-10-CM

## 2019-04-26 DIAGNOSIS — C22.0 HEPATOCELLULAR CARCINOMA (HCC): Primary | ICD-10-CM

## 2019-04-26 DIAGNOSIS — K74.60 CHRONIC HEPATITIS C WITH CIRRHOSIS (HCC): ICD-10-CM

## 2019-04-26 DIAGNOSIS — D69.6 THROMBOCYTOPENIA (HCC): ICD-10-CM

## 2019-04-26 PROCEDURE — 96413 CHEMO IV INFUSION 1 HR: CPT

## 2019-04-26 PROCEDURE — 82105 ALPHA-FETOPROTEIN SERUM: CPT

## 2019-04-26 PROCEDURE — 99215 OFFICE O/P EST HI 40 MIN: CPT | Performed by: INTERNAL MEDICINE

## 2019-04-26 PROCEDURE — 85025 COMPLETE CBC W/AUTO DIFF WBC: CPT

## 2019-04-26 PROCEDURE — 36415 COLL VENOUS BLD VENIPUNCTURE: CPT

## 2019-04-26 PROCEDURE — 85610 PROTHROMBIN TIME: CPT

## 2019-04-26 PROCEDURE — 80053 COMPREHEN METABOLIC PANEL: CPT

## 2019-04-26 NOTE — PROGRESS NOTES
Hematology/Oncology Clinic Follow Up Visit    Patient Name: Haleigh Birmingham Record Number: [id number]    YOB: 1960    PCP: Dr. Sylvain Manning   Other providers: Dr. Shawn Sellers (liver)    Reason for Consultation:  Jen Bruce 2.2 cm spiculated right upper lobe mass  -3/13/19- CT guided core biopsy of RUL nodule-allergy consistent with squamous cell carcinoma. PD-L1+, TPS 31-40%  -3/25/19- PET/CT- RUL lung mass with increased FDG uptake to SUV 11.2.   Subcm mediastinal LNs withou Past Surgical History:   Procedure Laterality Date   • BIOPSY OF SKIN LESION      face and leg   • IR VARICOSE VEIN ENDOVENOUS LASER ABLATION(CPT=36478)  2018      Home Medications:    furosemide 40 MG Oral Tab Take 1 tablet (40 mg total) by mouth 2 (t 0850)  BSA (Calculated - sq m): --  Pulse: 65 (04/26 0850)  BP: 109/67 (04/26 0850)  Temp: 98.3 °F (36.8 °C) (04/26 0850)  Do Not Use - Resp Rate: --  SpO2: 98 % (04/26 0850)    Wt Readings from Last 6 Encounters:  04/26/19 : 106.7 kg (235 lb 4.8 oz)  04/1 104 04/26/2019    CO2 24.0 04/26/2019     Lab Results   Component Value Date    INR 1.64 (H) 04/26/2019     Lab Results   Component Value Date    AFPTM 2,006.4 (H) 04/26/2019    AFPTM 9,617.9 (H) 04/12/2019    AFPTM 30,440.0 (H) 03/27/2019    AFPTM 28,614. cancer on active immunotherapy requiring close monitoring      Yair Mcdaniel MD  Hematology/Medical [facility]

## 2019-04-26 NOTE — PROGRESS NOTES
Pt here for C3D1.   Arrives Ambulating independently, accompanied by Spouse           Modifications in dose or schedule: No, ok to treat with platelet count of 64     Frequency of blood return and site check throughout administration: Prior to administratio

## 2019-05-10 ENCOUNTER — OFFICE VISIT (OUTPATIENT)
Dept: HEMATOLOGY/ONCOLOGY | Age: 59
End: 2019-05-10
Attending: INTERNAL MEDICINE
Payer: COMMERCIAL

## 2019-05-10 VITALS
DIASTOLIC BLOOD PRESSURE: 69 MMHG | WEIGHT: 231 LBS | BODY MASS INDEX: 31 KG/M2 | OXYGEN SATURATION: 99 % | HEART RATE: 74 BPM | RESPIRATION RATE: 18 BRPM | TEMPERATURE: 97 F | SYSTOLIC BLOOD PRESSURE: 106 MMHG

## 2019-05-10 DIAGNOSIS — G47.09 OTHER INSOMNIA: ICD-10-CM

## 2019-05-10 DIAGNOSIS — C22.0 HEPATOCELLULAR CARCINOMA (HCC): Primary | ICD-10-CM

## 2019-05-10 DIAGNOSIS — F17.210 CIGARETTE SMOKER: ICD-10-CM

## 2019-05-10 DIAGNOSIS — D61.818 PANCYTOPENIA (HCC): ICD-10-CM

## 2019-05-10 DIAGNOSIS — C34.91 PRIMARY LUNG SQUAMOUS CELL CARCINOMA, RIGHT (HCC): ICD-10-CM

## 2019-05-10 DIAGNOSIS — B18.2 CHRONIC HEPATITIS C WITH CIRRHOSIS (HCC): ICD-10-CM

## 2019-05-10 DIAGNOSIS — D69.6 THROMBOCYTOPENIA (HCC): ICD-10-CM

## 2019-05-10 DIAGNOSIS — R60.0 BILATERAL LOWER EXTREMITY EDEMA: ICD-10-CM

## 2019-05-10 DIAGNOSIS — K74.60 CHRONIC HEPATITIS C WITH CIRRHOSIS (HCC): ICD-10-CM

## 2019-05-10 DIAGNOSIS — K59.09 OTHER CONSTIPATION: ICD-10-CM

## 2019-05-10 PROCEDURE — 96413 CHEMO IV INFUSION 1 HR: CPT

## 2019-05-10 PROCEDURE — 99215 OFFICE O/P EST HI 40 MIN: CPT | Performed by: INTERNAL MEDICINE

## 2019-05-10 RX ORDER — ESZOPICLONE 1 MG/1
1 TABLET, FILM COATED ORAL NIGHTLY
Qty: 30 TABLET | Refills: 0 | Status: SHIPPED | OUTPATIENT
Start: 2019-05-10 | End: 2019-06-07

## 2019-05-10 NOTE — PROGRESS NOTES
Hematology/Oncology Clinic Follow Up Visit    Patient Name: Haleigh Birmingham Record Number: [id number]    YOB: 1960    PCP: Dr. Caitlin Marsh   Other providers: Dr. Jaziel Diggs (liver)    Reason for Consultation:  Andre mendez type, cT1N0  -2/3/19- CT chest- 2.2 cm spiculated right upper lobe mass  -3/13/19- CT guided core biopsy of RUL nodule-allergy consistent with squamous cell carcinoma. PD-L1+, TPS 31-40%  -3/25/19- PET/CT- RUL lung mass with increased FDG uptake to SUV 11. Leukocytopenia 9/23/2014   • Morbid obesity with BMI of 40.0-44.9, adult ([facility] 9/23/2014   • Primary lung squamous cell carcinoma, right ([facility] 3/14/2019   • Severe obesity (BMI 35.0-39. 9) 8/22/2017   • Symptomatic varicose veins of both lower extremities 8 No      Alcohol/week: 1.2 - 2.4 oz      Types: 2 - 4 Standard drinks or equivalent per week      Frequency: Never      Comment: none, quit 2/2019.  +prior hx of excessive use    Drug use: No    Family Medical History:  Family History   Problem Relation Age 0.69 (L) 04/26/2019    CREATSERUM 0.75 04/12/2019    ANIONGAP 7 05/10/2019     10/25/2016    GFRNAA 106 05/10/2019    GFRAA 122 05/10/2019    CA 7.8 (L) 05/10/2019    OSMOCALC 283 05/10/2019    ALKPHO 168 (H) 05/10/2019    AST 51 (H) 05/10/2019    A may respond to the nivo as well), if his [facility] does not respond, his survival will not be long enough to benefit from any local treatment (surg or RT) of his lung cancer.   If clinically improves/stabilizes over this time, would favor SBRT to this site in the

## 2019-05-24 ENCOUNTER — OFFICE VISIT (OUTPATIENT)
Dept: HEMATOLOGY/ONCOLOGY | Age: 59
End: 2019-05-24
Attending: INTERNAL MEDICINE
Payer: COMMERCIAL

## 2019-05-24 ENCOUNTER — DIETICIAN VISIT (OUTPATIENT)
Dept: HEMATOLOGY/ONCOLOGY | Facility: HOSPITAL | Age: 59
End: 2019-05-24

## 2019-05-24 VITALS
RESPIRATION RATE: 18 BRPM | WEIGHT: 226.81 LBS | DIASTOLIC BLOOD PRESSURE: 64 MMHG | BODY MASS INDEX: 31 KG/M2 | OXYGEN SATURATION: 98 % | HEART RATE: 63 BPM | TEMPERATURE: 99 F | SYSTOLIC BLOOD PRESSURE: 101 MMHG

## 2019-05-24 DIAGNOSIS — C22.0 HEPATOCELLULAR CARCINOMA (HCC): Primary | ICD-10-CM

## 2019-05-24 DIAGNOSIS — G47.09 OTHER INSOMNIA: ICD-10-CM

## 2019-05-24 DIAGNOSIS — R79.89 ELEVATED LIVER FUNCTION TESTS: ICD-10-CM

## 2019-05-24 DIAGNOSIS — D69.6 THROMBOCYTOPENIA (HCC): ICD-10-CM

## 2019-05-24 DIAGNOSIS — D61.818 PANCYTOPENIA (HCC): ICD-10-CM

## 2019-05-24 DIAGNOSIS — B18.2 CHRONIC HEPATITIS C WITH CIRRHOSIS (HCC): ICD-10-CM

## 2019-05-24 DIAGNOSIS — C34.91 PRIMARY LUNG SQUAMOUS CELL CARCINOMA, RIGHT (HCC): ICD-10-CM

## 2019-05-24 DIAGNOSIS — F17.210 CIGARETTE SMOKER: ICD-10-CM

## 2019-05-24 DIAGNOSIS — K74.60 CHRONIC HEPATITIS C WITH CIRRHOSIS (HCC): ICD-10-CM

## 2019-05-24 PROCEDURE — 96413 CHEMO IV INFUSION 1 HR: CPT

## 2019-05-24 PROCEDURE — 99215 OFFICE O/P EST HI 40 MIN: CPT | Performed by: INTERNAL MEDICINE

## 2019-05-24 NOTE — PROGRESS NOTES
Nutrition screen complete as triggered by unplanned wt loss. Chart reviewed. Noted pt w/ h/o ascites and LE edema now improved. Noted pt reporting good appetite. Pt appears at a mild to moderate nutrition risk at present. RD available on consult.

## 2019-05-24 NOTE — PROGRESS NOTES
Hematology/Oncology Clinic Follow Up Visit    Patient Name: Haleigh Birmingham Record Number: [id number]    YOB: 1960    PCP: Dr. Robert Britton   Other providers: Dr. Vi Brooks (liver)    Reason for Consultation:  Mignon mendez nivolumab    *NSCLC, SCC type, cT1N0  -2/3/19- CT chest- 2.2 cm spiculated right upper lobe mass  -3/13/19- CT guided core biopsy of RUL nodule-allergy consistent with squamous cell carcinoma.  PD-L1+, TPS 31-40%  -3/25/19- PET/CT- RUL lung mass with increa squamous cell carcinoma, right ([facility] 3/14/2019   • Severe obesity (BMI 35.0-39. 9) 8/22/2017   • Symptomatic varicose veins of both lower extremities 8/22/2017   • Thrombocytopenia ([facility] 8/22/2017   • Unspecified essential hypertension    • Visual impairmen Never      Comment: none, quit 2/2019.  +prior hx of excessive use    Drug use: No    Family Medical History:  Family History   Problem Relation Age of Onset   • Cancer Neg    • Blood Disorder Neg      Review of Systems:  A 10-point ROS was done with josesito 10/25/2016    GFRNAA 109 05/24/2019    GFRAA 126 05/24/2019    CA 8.2 (L) 05/24/2019    OSMOCALC 283 05/24/2019    ALKPHO 239 (H) 05/24/2019    AST 52 (H) 05/24/2019    ALT 34 05/24/2019    BILT 1.7 05/24/2019    TP 7.1 05/24/2019    ALB 2.7 (L) 05/24/2019 stage T1N0. This is not his most pressing concern.   Will observe to see if he responds to nivolumab from an [facility] [address] standpoint (lung lesion may respond to the nivo as well), if his [facility] does not respond, his survival will not be long enough to benefit from any

## 2019-05-24 NOTE — PROGRESS NOTES
Outpatient Oncology Care Plan  Problem list:  loss of appetite  fatigue  knowledge deficit    Problems related to:    disease/disease progression  side effect of treatment    Interventions:  emotional support given  encourage activity as tolerated  provide

## 2019-05-24 NOTE — PROGRESS NOTES
Pt here for c5 opdivo.   Arrives Ambulating independently, accompanied by Self           Modifications in dose or schedule: No     Frequency of blood return and site check throughout administration: Prior to administration and At completion of therapy   Dis

## 2019-06-07 ENCOUNTER — OFFICE VISIT (OUTPATIENT)
Dept: HEMATOLOGY/ONCOLOGY | Age: 59
End: 2019-06-07
Attending: INTERNAL MEDICINE
Payer: COMMERCIAL

## 2019-06-07 ENCOUNTER — TELEPHONE (OUTPATIENT)
Dept: HEMATOLOGY/ONCOLOGY | Facility: HOSPITAL | Age: 59
End: 2019-06-07

## 2019-06-07 VITALS
OXYGEN SATURATION: 97 % | SYSTOLIC BLOOD PRESSURE: 109 MMHG | TEMPERATURE: 98 F | HEART RATE: 67 BPM | RESPIRATION RATE: 18 BRPM | WEIGHT: 229.38 LBS | DIASTOLIC BLOOD PRESSURE: 67 MMHG | BODY MASS INDEX: 31 KG/M2

## 2019-06-07 DIAGNOSIS — E88.09 HYPOALBUMINEMIA DUE TO PROTEIN-CALORIE MALNUTRITION (HCC): ICD-10-CM

## 2019-06-07 DIAGNOSIS — G47.09 OTHER INSOMNIA: ICD-10-CM

## 2019-06-07 DIAGNOSIS — C22.0 HEPATOCELLULAR CARCINOMA (HCC): Primary | ICD-10-CM

## 2019-06-07 DIAGNOSIS — K74.60 CHRONIC HEPATITIS C WITH CIRRHOSIS (HCC): ICD-10-CM

## 2019-06-07 DIAGNOSIS — D61.818 PANCYTOPENIA (HCC): ICD-10-CM

## 2019-06-07 DIAGNOSIS — C34.91 PRIMARY LUNG SQUAMOUS CELL CARCINOMA, RIGHT (HCC): ICD-10-CM

## 2019-06-07 DIAGNOSIS — D70.9 NEUTROPENIA, UNSPECIFIED TYPE (HCC): ICD-10-CM

## 2019-06-07 DIAGNOSIS — B18.2 CHRONIC HEPATITIS C WITH CIRRHOSIS (HCC): ICD-10-CM

## 2019-06-07 DIAGNOSIS — D69.6 THROMBOCYTOPENIA (HCC): ICD-10-CM

## 2019-06-07 DIAGNOSIS — F17.210 CIGARETTE SMOKER: ICD-10-CM

## 2019-06-07 DIAGNOSIS — E46 HYPOALBUMINEMIA DUE TO PROTEIN-CALORIE MALNUTRITION (HCC): ICD-10-CM

## 2019-06-07 DIAGNOSIS — D64.9 NORMOCYTIC ANEMIA: ICD-10-CM

## 2019-06-07 PROCEDURE — 99215 OFFICE O/P EST HI 40 MIN: CPT | Performed by: INTERNAL MEDICINE

## 2019-06-07 PROCEDURE — 96413 CHEMO IV INFUSION 1 HR: CPT

## 2019-06-07 RX ORDER — ESZOPICLONE 1 MG/1
1-2 TABLET, FILM COATED ORAL NIGHTLY
Qty: 60 TABLET | Refills: 3 | Status: SHIPPED | OUTPATIENT
Start: 2019-06-07 | End: 2019-11-18

## 2019-06-07 RX ORDER — FOLIC ACID 1 MG/1
1 TABLET ORAL DAILY
Qty: 90 TABLET | Refills: 3 | Status: SHIPPED | OUTPATIENT
Start: 2019-06-07 | End: 2020-01-01

## 2019-06-07 NOTE — PROGRESS NOTES
Hematology/Oncology Clinic Follow Up Visit    Patient Name: Haleigh Birmingham Record Number: [id number]    YOB: 1960    PCP: Dr. Vivienne Franco   Other providers: Dr. Teresa Osei (liver)    Reason for Consultation:  Lesli mendez nivolumab  -6/7/19- cycle 6 nivolumab    *NSCLC, SCC type, cT1N0  -2/3/19- CT chest- 2.2 cm spiculated right upper lobe mass  -3/13/19- CT guided core biopsy of RUL nodule-allergy consistent with squamous cell carcinoma.  PD-L1+, TPS 31-40%  -3/25/19- PET/C of 40.0-44.9, adult ([facility] 9/23/2014   • Primary lung squamous cell carcinoma, right ([facility] 3/14/2019   • Severe obesity (BMI 35.0-39. 9) 8/22/2017   • Symptomatic varicose veins of both lower extremities 8/22/2017   • Thrombocytopenia ([facility] 8/22/2017   • Uns 4 Standard drinks or equivalent per week      Frequency: Never      Comment: none, quit 2/2019.  +prior hx of excessive use    Drug use: No    Family Medical History:  Family History   Problem Relation Age of Onset   • Cancer Neg    • Blood Disorder Neg CREATSERUM 0.66 (L) 05/10/2019    ANIONGAP 6 06/07/2019     10/25/2016    GFRNAA 113 06/07/2019    GFRAA 131 06/07/2019    CA 7.8 (L) 06/07/2019    OSMOCALC 286 06/07/2019    ALKPHO 210 (H) 06/07/2019    AST 43 (H) 06/07/2019    ALT 28 06/07/2019 Results   Component Value Date    LAMBDALTCH 5.226 (H) 12/17/2018     Lab Results   Component Value Date    JONNYO 0.70 12/17/2018     Lab Results   Component Value Date    TSH 1.410 05/10/2019    TSH 3.590 03/27/2019    TSH 2.020 12/04/2018    TSH 1. concern.   Will observe to see if he responds to nivolumab from an [facility] [address] [address] standpoint (lung lesion may respond to the nivo as well), if his [facility] does not respond, his survival will not be long enough to benefit from any local treatment (surg or RT) of his lung c

## 2019-06-07 NOTE — TELEPHONE ENCOUNTER
Evangelist Dailey MD  P Edw Ricardo Reid Rns             Please call pt and advise that he should start taking folic acid daily to help improve his blood counts. I sent a Rx for this to his pharmacy.        Called LVM on both home and cell.  Call if question

## 2019-06-07 NOTE — PATIENT INSTRUCTIONS
For  nurse line, call [phone] with any questions or concerns,  Monday through Friday 8:00 to 4:30.      After hours or weekends for urgent needs: [phone].   Central [phone]  Medical Records:   [address]

## 2019-06-07 NOTE — PROGRESS NOTES
Outpatient Oncology Care Plan  Problem list:  loss of appetite  fatigue  knowledge deficit    Problems related to:    disease/disease progression  side effect of treatment    Interventions:  encourage activity as tolerated  provided general teaching    Exp

## 2019-06-21 ENCOUNTER — OFFICE VISIT (OUTPATIENT)
Dept: HEMATOLOGY/ONCOLOGY | Age: 59
End: 2019-06-21
Attending: INTERNAL MEDICINE
Payer: COMMERCIAL

## 2019-06-21 VITALS
BODY MASS INDEX: 30.85 KG/M2 | TEMPERATURE: 97 F | HEART RATE: 69 BPM | HEIGHT: 72.05 IN | WEIGHT: 227.81 LBS | OXYGEN SATURATION: 100 % | DIASTOLIC BLOOD PRESSURE: 86 MMHG | SYSTOLIC BLOOD PRESSURE: 135 MMHG | RESPIRATION RATE: 18 BRPM

## 2019-06-21 DIAGNOSIS — F17.210 CIGARETTE SMOKER: ICD-10-CM

## 2019-06-21 DIAGNOSIS — D61.818 PANCYTOPENIA (HCC): ICD-10-CM

## 2019-06-21 DIAGNOSIS — C34.91 PRIMARY LUNG SQUAMOUS CELL CARCINOMA, RIGHT (HCC): ICD-10-CM

## 2019-06-21 DIAGNOSIS — C22.0 HEPATOCELLULAR CARCINOMA (HCC): Primary | ICD-10-CM

## 2019-06-21 DIAGNOSIS — B18.2 CHRONIC HEPATITIS C WITH CIRRHOSIS (HCC): ICD-10-CM

## 2019-06-21 DIAGNOSIS — G47.09 OTHER INSOMNIA: ICD-10-CM

## 2019-06-21 DIAGNOSIS — K74.60 CHRONIC HEPATITIS C WITH CIRRHOSIS (HCC): ICD-10-CM

## 2019-06-21 DIAGNOSIS — M79.672 LEFT FOOT PAIN: ICD-10-CM

## 2019-06-21 DIAGNOSIS — D70.9 NEUTROPENIA, UNSPECIFIED TYPE (HCC): ICD-10-CM

## 2019-06-21 DIAGNOSIS — D69.6 THROMBOCYTOPENIA (HCC): ICD-10-CM

## 2019-06-21 PROCEDURE — 96413 CHEMO IV INFUSION 1 HR: CPT

## 2019-06-21 PROCEDURE — 99215 OFFICE O/P EST HI 40 MIN: CPT | Performed by: INTERNAL MEDICINE

## 2019-06-21 NOTE — PROGRESS NOTES
Pt here for [address]   Arrives Ambulating independently, accompanied by Self           Modifications in dose or schedule: No     Frequency of blood return and site check throughout administration: Prior to administration and At completion of therapy   D

## 2019-06-21 NOTE — PROGRESS NOTES
Hematology/Oncology Clinic Follow Up Visit    Patient Name: Haleigh Birmingham Record Number: [id number]    YOB: 1960    PCP: Dr. Magaly Molina   Other providers: Dr. Manuel Liang (liver)    Reason for Consultation:  Tita mendez nivolumab  -6/7/19- cycle 6 nivolumab  -6/21/19- cycle 7 nivolumab    *NSCLC, SCC type, cT1N0  -2/3/19- CT chest- 2.2 cm spiculated right upper lobe mass  -3/13/19- CT guided core biopsy of RUL nodule-allergy consistent with squamous cell carcinoma.  PD-L1+ bladder cancer 9/23/2014   • Hepatocellular carcinoma ([facility] 3/20/2019   • High blood pressure    • Hypoalbuminemia due to protein-calorie malnutrition ([facility] 8/22/2017   • Leukocytopenia 9/23/2014   • Morbid obesity with BMI of 40.0-44.9, adult ([facility] 9/23/2 Pack years: 45        Types: Cigarettes        Quit date: 2019        Years since quittin.3      Smokeless tobacco: Former User        Types: Snuff      Tobacco comment: 2ppd for 20yrs, 1ppd for 10 years, 10 cigs per day for the last few years 06/21/2019    .6 (H) 06/21/2019    MCH 35.0 (H) 06/21/2019    MCHC 34.8 06/21/2019    RDW 16.0 (H) 06/21/2019    PLT 47.0 (L) 06/21/2019    PLT 42.0 (L) 06/07/2019    PLT 46.0 (L) 05/24/2019   ,   Lab Results   Component Value Date ESRML 37 (H) 06/07/2019    ESRML 25 (H) 12/17/2018     Lab Results   Component Value Date    CRP <0.29 06/07/2019    CRP <0.29 12/17/2018     Lab Results   Component Value Date     06/07/2019     12/17/2018     Lab Results   Component Value D needed, though seems to no longer be re-accumulating fluid- likely a sign of response to immunotherapy  -avoiding etoh  -Following with Dr. Yaima Crabtree  -repeat CMP in 2 weeks with next treatment as above    *NSCLC, squamous cell type  -clinical stage T1N0.   Janey Moran

## 2019-06-21 NOTE — PROGRESS NOTES
Outpatient Oncology Care Plan  Problem list:  fatigue  knowledge deficit    Problems related to:    disease/disease progression    Interventions:  encourage activity as tolerated  promoted rest  provided general teaching    Expected outcomes:  understands

## 2019-06-28 ENCOUNTER — HOSPITAL ENCOUNTER (OUTPATIENT)
Dept: CT IMAGING | Age: 59
Discharge: HOME | End: 2019-06-28
Attending: INTERNAL MEDICINE
Payer: COMMERCIAL

## 2019-06-28 DIAGNOSIS — C22.0 HEPATOCELLULAR CARCINOMA (HCC): ICD-10-CM

## 2019-06-28 DIAGNOSIS — C34.91 PRIMARY LUNG SQUAMOUS CELL CARCINOMA, RIGHT (HCC): ICD-10-CM

## 2019-06-28 PROCEDURE — 71260 CT THORAX DX C+: CPT | Performed by: INTERNAL MEDICINE

## 2019-06-28 PROCEDURE — 74178 CT ABD&PLV WO CNTR FLWD CNTR: CPT | Performed by: INTERNAL MEDICINE

## 2019-07-02 ENCOUNTER — OFFICE VISIT (OUTPATIENT)
Dept: HEMATOLOGY/ONCOLOGY | Age: 59
End: 2019-07-02
Attending: INTERNAL MEDICINE
Payer: COMMERCIAL

## 2019-07-02 VITALS
OXYGEN SATURATION: 96 % | TEMPERATURE: 98 F | RESPIRATION RATE: 18 BRPM | SYSTOLIC BLOOD PRESSURE: 99 MMHG | DIASTOLIC BLOOD PRESSURE: 63 MMHG | BODY MASS INDEX: 31 KG/M2 | WEIGHT: 225.44 LBS | HEART RATE: 68 BPM

## 2019-07-02 DIAGNOSIS — G47.09 OTHER INSOMNIA: ICD-10-CM

## 2019-07-02 DIAGNOSIS — F10.11 ALCOHOL ABUSE, IN REMISSION: ICD-10-CM

## 2019-07-02 DIAGNOSIS — B18.2 CHRONIC HEPATITIS C WITH CIRRHOSIS (HCC): ICD-10-CM

## 2019-07-02 DIAGNOSIS — C22.0 HEPATOCELLULAR CARCINOMA (HCC): Primary | ICD-10-CM

## 2019-07-02 DIAGNOSIS — C34.91 PRIMARY LUNG SQUAMOUS CELL CARCINOMA, RIGHT (HCC): ICD-10-CM

## 2019-07-02 DIAGNOSIS — D69.6 THROMBOCYTOPENIA (HCC): ICD-10-CM

## 2019-07-02 DIAGNOSIS — F17.210 CIGARETTE SMOKER: ICD-10-CM

## 2019-07-02 DIAGNOSIS — D61.818 PANCYTOPENIA (HCC): ICD-10-CM

## 2019-07-02 DIAGNOSIS — K74.60 CHRONIC HEPATITIS C WITH CIRRHOSIS (HCC): ICD-10-CM

## 2019-07-02 PROCEDURE — 99215 OFFICE O/P EST HI 40 MIN: CPT | Performed by: INTERNAL MEDICINE

## 2019-07-02 NOTE — PROGRESS NOTES
Hematology/Oncology Clinic Follow Up Visit    Patient Name: Haleigh Birmingham Record Number: [id number]    YOB: 1960    PCP: Dr. Magaly Molina   Other providers: Dr. Manuel Liang (liver)    Reason for Consultation:  Tita mendez nivolumab  -6/7/19- cycle 6 nivolumab  -6/21/19- cycle 7 nivolumab  -6/28/19- CT c/a/p- Significant decrease in the size of the RUL lobe spiculated lung mass (21 x 23mm -> 11 x 14mm).   Decrease in size of heterogeneously enhancing mass in segment 5 of the Alcoholism /alcohol abuse ([facility] 9/23/2014   • Chronic cough 5/8/2015   • Cigarette smoker 5/8/2015    50 pack year h/o smoking    • Elevated liver function tests 7/24/2013   • Family history of bladder cancer 9/23/2014   • Hepatocellular carcinoma ([facility] 3/ technician specialist- fixes machines, HVAC and plumbing, maintenance.      Social History    Tobacco Use      Smoking status: Former Smoker        Packs/day: 1.00        Years: 45.00        Pack years: 39        Types: Cigarettes        Quit date: 2/20/201 06/07/2019    WBC 2.0 (L) 05/24/2019    HGB 11.8 (L) 06/21/2019    HGB 10.9 (L) 06/07/2019    HGB 11.5 (L) 05/24/2019    HCT 33.9 (L) 06/21/2019    .6 (H) 06/21/2019    MCH 35.0 (H) 06/21/2019    MCHC 34.8 06/21/2019    RDW 16.0 (H) 06/21/2019    PL 12/17/2018     Lab Results   Component Value Date    COPPER 92 12/17/2018     Lab Results   Component Value Date    ESRML 37 (H) 06/07/2019    ESRML 25 (H) 12/17/2018     Lab Results   Component Value Date    CRP <0.29 06/07/2019    CRP <0.29 12/17/2018 treatments    *NSCLC, squamous cell type  -clinical stage T1N0. This is not his most pressing concern but has also had a good response to nivolumab as well based on improvement on CT.   -continue to observe on serial scans- next CT in 3 months.  Based on r

## 2019-07-03 ENCOUNTER — NURSE ONLY (OUTPATIENT)
Dept: HEMATOLOGY/ONCOLOGY | Facility: HOSPITAL | Age: 59
End: 2019-07-03

## 2019-07-03 PROBLEM — F10.11 ALCOHOL ABUSE, IN REMISSION: Status: ACTIVE | Noted: 2019-07-03

## 2019-07-05 ENCOUNTER — OFFICE VISIT (OUTPATIENT)
Dept: HEMATOLOGY/ONCOLOGY | Age: 59
End: 2019-07-05
Attending: INTERNAL MEDICINE
Payer: COMMERCIAL

## 2019-07-05 VITALS
HEIGHT: 72.05 IN | WEIGHT: 230 LBS | BODY MASS INDEX: 31.15 KG/M2 | OXYGEN SATURATION: 97 % | RESPIRATION RATE: 18 BRPM | SYSTOLIC BLOOD PRESSURE: 128 MMHG | DIASTOLIC BLOOD PRESSURE: 76 MMHG | TEMPERATURE: 98 F | HEART RATE: 73 BPM

## 2019-07-05 DIAGNOSIS — K74.60 CHRONIC HEPATITIS C WITH CIRRHOSIS (HCC): ICD-10-CM

## 2019-07-05 DIAGNOSIS — Z51.12 ENCOUNTER FOR ANTINEOPLASTIC IMMUNOTHERAPY: ICD-10-CM

## 2019-07-05 DIAGNOSIS — C34.91 PRIMARY LUNG SQUAMOUS CELL CARCINOMA, RIGHT (HCC): ICD-10-CM

## 2019-07-05 DIAGNOSIS — B18.2 CHRONIC HEPATITIS C WITH CIRRHOSIS (HCC): ICD-10-CM

## 2019-07-05 DIAGNOSIS — C22.0 HEPATOCELLULAR CARCINOMA (HCC): Primary | ICD-10-CM

## 2019-07-05 DIAGNOSIS — D61.818 PANCYTOPENIA (HCC): ICD-10-CM

## 2019-07-05 DIAGNOSIS — F17.210 CIGARETTE SMOKER: ICD-10-CM

## 2019-07-05 LAB
AFP-TM SERPL-MCNC: 23.5 NG/ML (ref ?–8)
ALBUMIN SERPL-MCNC: 2.8 G/DL (ref 3.4–5)
ALBUMIN/GLOB SERPL: 0.7 {RATIO} (ref 1–2)
ALP LIVER SERPL-CCNC: 293 U/L (ref 45–117)
ALT SERPL-CCNC: 33 U/L (ref 16–61)
ANION GAP SERPL CALC-SCNC: 7 MMOL/L (ref 0–18)
AST SERPL-CCNC: 56 U/L (ref 15–37)
BASOPHILS # BLD AUTO: 0.01 X10(3) UL (ref 0–0.2)
BASOPHILS NFR BLD AUTO: 0.6 %
BILIRUB SERPL-MCNC: 2.1 MG/DL (ref 0.1–2)
BUN BLD-MCNC: 7 MG/DL (ref 7–18)
BUN/CREAT SERPL: 12.1 (ref 10–20)
CALCIUM BLD-MCNC: 8.4 MG/DL (ref 8.5–10.1)
CHLORIDE SERPL-SCNC: 107 MMOL/L (ref 98–112)
CO2 SERPL-SCNC: 24 MMOL/L (ref 21–32)
CREAT BLD-MCNC: 0.58 MG/DL (ref 0.7–1.3)
DEPRECATED RDW RBC AUTO: 57.4 FL (ref 35.1–46.3)
EOSINOPHIL # BLD AUTO: 0.05 X10(3) UL (ref 0–0.7)
EOSINOPHIL NFR BLD AUTO: 3 %
ERYTHROCYTE [DISTWIDTH] IN BLOOD BY AUTOMATED COUNT: 15.3 % (ref 11–15)
GLOBULIN PLAS-MCNC: 4.1 G/DL (ref 2.8–4.4)
GLUCOSE BLD-MCNC: 163 MG/DL (ref 70–99)
HCT VFR BLD AUTO: 32.4 % (ref 39–53)
HGB BLD-MCNC: 11.4 G/DL (ref 13–17.5)
IMM GRANULOCYTES # BLD AUTO: 0.01 X10(3) UL (ref 0–1)
IMM GRANULOCYTES NFR BLD: 0.6 %
INR BLD: 1.55 (ref 0.9–1.1)
LYMPHOCYTES # BLD AUTO: 0.45 X10(3) UL (ref 1–4)
LYMPHOCYTES NFR BLD AUTO: 26.9 %
M PROTEIN MFR SERPL ELPH: 6.9 G/DL (ref 6.4–8.2)
MCH RBC QN AUTO: 35.6 PG (ref 26–34)
MCHC RBC AUTO-ENTMCNC: 35.2 G/DL (ref 31–37)
MCV RBC AUTO: 101.3 FL (ref 80–100)
MONOCYTES # BLD AUTO: 0.21 X10(3) UL (ref 0.1–1)
MONOCYTES NFR BLD AUTO: 12.6 %
NEUTROPHILS # BLD AUTO: 0.94 X10 (3) UL (ref 1.5–7.7)
NEUTROPHILS # BLD AUTO: 0.94 X10(3) UL (ref 1.5–7.7)
NEUTROPHILS NFR BLD AUTO: 56.3 %
OSMOLALITY SERPL CALC.SUM OF ELEC: 288 MOSM/KG (ref 275–295)
PLATELET # BLD AUTO: 42 10(3)UL (ref 150–450)
POTASSIUM SERPL-SCNC: 3.8 MMOL/L (ref 3.5–5.1)
PSA SERPL DL<=0.01 NG/ML-MCNC: 19 SECONDS (ref 12.2–14.4)
RBC # BLD AUTO: 3.2 X10(6)UL (ref 4.3–5.7)
SODIUM SERPL-SCNC: 138 MMOL/L (ref 136–145)
WBC # BLD AUTO: 1.7 X10(3) UL (ref 4–11)

## 2019-07-05 PROCEDURE — 82105 ALPHA-FETOPROTEIN SERUM: CPT

## 2019-07-05 PROCEDURE — 80053 COMPREHEN METABOLIC PANEL: CPT

## 2019-07-05 PROCEDURE — 85610 PROTHROMBIN TIME: CPT

## 2019-07-05 PROCEDURE — 85025 COMPLETE CBC W/AUTO DIFF WBC: CPT

## 2019-07-05 PROCEDURE — 99214 OFFICE O/P EST MOD 30 MIN: CPT | Performed by: CLINICAL NURSE SPECIALIST

## 2019-07-05 PROCEDURE — 96413 CHEMO IV INFUSION 1 HR: CPT

## 2019-07-05 RX ORDER — PREDNISONE 20 MG/1
20 TABLET ORAL DAILY
Qty: 18 TABLET | Refills: 0 | Status: SHIPPED | OUTPATIENT
Start: 2019-07-05 | End: 2019-07-05 | Stop reason: CLARIF

## 2019-07-05 NOTE — PROGRESS NOTES
ANP Visit Note    Patient Name: Jen Gordon   YOB: 1960   Medical Record Number: [id number]   CSN: [id number]   Date of visit: 7/5/2019       Chief Complaint/Reason for Visit:  Patient presents with:   Follow - Up  Chemotherapy  HCC,Lung C nivolumab  -6/21/19- cycle 7 nivolumab  -6/28/19- CT c/a/p- Significant decrease in the size of the RUL lobe spiculated lung mass (21 x 23mm -> 11 x 14mm).   Decrease in size of heterogeneously enhancing mass in segment 5 of the liver compared to the previo glucose     Bilateral lower extremity edema     Chronic hepatitis C with cirrhosis (HCC)     Pancytopenia (HCC)     Hypoalbuminemia     Splenomegaly     Primary lung squamous cell carcinoma, right (HCC)     Hepatocellular carcinoma (HCC)     Leukopenia Transportation needs:        Medical: Not on file        Non-medical: Not on file    Tobacco Use      Smoking status: Former Smoker        Packs/day: 1.00        Years: 45.00        Pack years: 45        Types: Cigarettes        Quit date: 2/20/2019 his wife. No children. Works as a technician specialist- fixes machines, HVAC and plumbing, maintenance.        Medications:    Current Outpatient Medications:   •  Eszopiclone 1 MG Oral Tab, Take 1-2 tablets (1-2 mg total) by mouth nightly., Disp: 60 tab cervical,supraclavicular, axillary, or inguinal regions. Psych/Depression: mood and affect are appropriate.      Labs:     Recent Results (from the past 72 hour(s))   PROTHROMBIN TIME (PT)    Collection Time: 07/05/19 11:04 AM   Result Value Ref Range    P Lymphocyte % 26.9 %    Monocyte % 12.6 %    Eosinophil % 3.0 %    Basophil % 0.6 %    Immature Granulocyte % 0.6 %       Impression/Plan:  1. HCC: proceed with Cycle 8 Nivo  2. Lung Cancer: Cycle 8 Nivo   3. Pancytopenia due to liver disease: stable   4.  Jose Eduardo Noble

## 2019-07-05 NOTE — PROGRESS NOTES
Outpatient Oncology Care Plan   Problem list:   loss of appetite  fatigue   Problems related to:   chemotherapy   Interventions:   optimize nutrition status   Expected outcomes:   understands plan of care   Progress towards outcome: making progress   [facility]anner

## 2019-07-05 NOTE — PROGRESS NOTES
Pt here for C8D1.   Arrives Ambulating independently, accompanied by            Modifications in dose or schedule: No     Frequency of blood return and site check throughout administration: Prior to administration   Discharged to Home, Ambulating independen

## 2019-07-19 ENCOUNTER — OFFICE VISIT (OUTPATIENT)
Dept: HEMATOLOGY/ONCOLOGY | Age: 59
End: 2019-07-19
Attending: INTERNAL MEDICINE
Payer: COMMERCIAL

## 2019-07-19 VITALS
DIASTOLIC BLOOD PRESSURE: 77 MMHG | BODY MASS INDEX: 29.8 KG/M2 | RESPIRATION RATE: 18 BRPM | HEART RATE: 61 BPM | WEIGHT: 220 LBS | SYSTOLIC BLOOD PRESSURE: 124 MMHG | TEMPERATURE: 97 F | HEIGHT: 72.05 IN | OXYGEN SATURATION: 98 %

## 2019-07-19 DIAGNOSIS — R16.1 SPLENOMEGALY: ICD-10-CM

## 2019-07-19 DIAGNOSIS — B18.2 CHRONIC HEPATITIS C WITH CIRRHOSIS (HCC): ICD-10-CM

## 2019-07-19 DIAGNOSIS — K74.60 CHRONIC HEPATITIS C WITH CIRRHOSIS (HCC): ICD-10-CM

## 2019-07-19 DIAGNOSIS — C34.91 PRIMARY LUNG SQUAMOUS CELL CARCINOMA, RIGHT (HCC): ICD-10-CM

## 2019-07-19 DIAGNOSIS — D69.6 THROMBOCYTOPENIA (HCC): ICD-10-CM

## 2019-07-19 DIAGNOSIS — M79.672 LEFT FOOT PAIN: ICD-10-CM

## 2019-07-19 DIAGNOSIS — D70.9 NEUTROPENIA, UNSPECIFIED TYPE (HCC): ICD-10-CM

## 2019-07-19 DIAGNOSIS — F17.210 CIGARETTE SMOKER: ICD-10-CM

## 2019-07-19 DIAGNOSIS — D61.818 PANCYTOPENIA (HCC): ICD-10-CM

## 2019-07-19 DIAGNOSIS — C22.0 HEPATOCELLULAR CARCINOMA (HCC): Primary | ICD-10-CM

## 2019-07-19 DIAGNOSIS — F10.11 ALCOHOL ABUSE, IN REMISSION: ICD-10-CM

## 2019-07-19 LAB
AFP-TM SERPL-MCNC: 16.8 NG/ML (ref ?–8)
ALBUMIN SERPL-MCNC: 2.8 G/DL (ref 3.4–5)
ALBUMIN/GLOB SERPL: 0.7 {RATIO} (ref 1–2)
ALP LIVER SERPL-CCNC: 220 U/L (ref 45–117)
ALT SERPL-CCNC: 39 U/L (ref 16–61)
ANION GAP SERPL CALC-SCNC: 6 MMOL/L (ref 0–18)
AST SERPL-CCNC: 46 U/L (ref 15–37)
BASOPHILS # BLD AUTO: 0.01 X10(3) UL (ref 0–0.2)
BASOPHILS NFR BLD AUTO: 0.5 %
BILIRUB SERPL-MCNC: 3.4 MG/DL (ref 0.1–2)
BUN BLD-MCNC: 10 MG/DL (ref 7–18)
BUN/CREAT SERPL: 15.6 (ref 10–20)
CALCIUM BLD-MCNC: 8.2 MG/DL (ref 8.5–10.1)
CHLORIDE SERPL-SCNC: 103 MMOL/L (ref 98–112)
CO2 SERPL-SCNC: 24 MMOL/L (ref 21–32)
CREAT BLD-MCNC: 0.64 MG/DL (ref 0.7–1.3)
DEPRECATED RDW RBC AUTO: 55.5 FL (ref 35.1–46.3)
EOSINOPHIL # BLD AUTO: 0.15 X10(3) UL (ref 0–0.7)
EOSINOPHIL NFR BLD AUTO: 7 %
ERYTHROCYTE [DISTWIDTH] IN BLOOD BY AUTOMATED COUNT: 14.8 % (ref 11–15)
FIBRINOGEN: 174 MG/DL (ref 200–446)
GLOBULIN PLAS-MCNC: 4 G/DL (ref 2.8–4.4)
GLUCOSE BLD-MCNC: 181 MG/DL (ref 70–99)
HCT VFR BLD AUTO: 34.3 % (ref 39–53)
HGB BLD-MCNC: 12.2 G/DL (ref 13–17.5)
IMM GRANULOCYTES # BLD AUTO: 0.01 X10(3) UL (ref 0–1)
IMM GRANULOCYTES NFR BLD: 0.5 %
INR BLD: 1.46 (ref 0.9–1.1)
LYMPHOCYTES # BLD AUTO: 0.7 X10(3) UL (ref 1–4)
LYMPHOCYTES NFR BLD AUTO: 32.9 %
M PROTEIN MFR SERPL ELPH: 6.8 G/DL (ref 6.4–8.2)
MCH RBC QN AUTO: 36.2 PG (ref 26–34)
MCHC RBC AUTO-ENTMCNC: 35.6 G/DL (ref 31–37)
MCV RBC AUTO: 101.8 FL (ref 80–100)
MONOCYTES # BLD AUTO: 0.3 X10(3) UL (ref 0.1–1)
MONOCYTES NFR BLD AUTO: 14.1 %
NEUTROPHILS # BLD AUTO: 0.96 X10 (3) UL (ref 1.5–7.7)
NEUTROPHILS # BLD AUTO: 0.96 X10(3) UL (ref 1.5–7.7)
NEUTROPHILS NFR BLD AUTO: 45 %
OSMOLALITY SERPL CALC.SUM OF ELEC: 280 MOSM/KG (ref 275–295)
PLATELET # BLD AUTO: 38 10(3)UL (ref 150–450)
POTASSIUM SERPL-SCNC: 3.9 MMOL/L (ref 3.5–5.1)
PSA SERPL DL<=0.01 NG/ML-MCNC: 18.1 SECONDS (ref 12.2–14.4)
RBC # BLD AUTO: 3.37 X10(6)UL (ref 4.3–5.7)
SODIUM SERPL-SCNC: 133 MMOL/L (ref 136–145)
WBC # BLD AUTO: 2.1 X10(3) UL (ref 4–11)

## 2019-07-19 PROCEDURE — 80053 COMPREHEN METABOLIC PANEL: CPT

## 2019-07-19 PROCEDURE — 85384 FIBRINOGEN ACTIVITY: CPT

## 2019-07-19 PROCEDURE — 99215 OFFICE O/P EST HI 40 MIN: CPT | Performed by: INTERNAL MEDICINE

## 2019-07-19 PROCEDURE — 85610 PROTHROMBIN TIME: CPT

## 2019-07-19 PROCEDURE — 85025 COMPLETE CBC W/AUTO DIFF WBC: CPT

## 2019-07-19 PROCEDURE — 82105 ALPHA-FETOPROTEIN SERUM: CPT

## 2019-07-19 PROCEDURE — 96413 CHEMO IV INFUSION 1 HR: CPT

## 2019-07-19 NOTE — PROGRESS NOTES
Outpatient Oncology Care Plan  Problem list:  fatigue  knowledge deficit    Problems related to:    disease/disease progression    Interventions:  provided general teaching    Expected outcomes:  understands plan of care    Progress towards outcome:  sesar

## 2019-07-19 NOTE — PROGRESS NOTES
Hematology/Oncology Clinic Follow Up Visit    Patient Name: Haleigh Birmingham Record Number: [id number]    YOB: 1960    PCP: Dr. Regina Fuentes   Other providers: Dr. Saniya Khan (liver)    Reason for Consultation:  Tracy mendez nivolumab  -6/7/19- cycle 6 nivolumab  -6/21/19- cycle 7 nivolumab  -6/28/19- CT c/a/p- Significant decrease in the size of the RUL lobe spiculated lung mass (21 x 23mm -> 11 x 14mm).   Decrease in size of heterogeneously enhancing mass in segment 5 of the Alcoholism /alcohol abuse ([facility] 9/23/2014   • Chronic cough 5/8/2015   • Cigarette smoker 5/8/2015    50 pack year h/o smoking    • Elevated liver function tests 7/24/2013   • Family history of bladder cancer 9/23/2014   • Hepatocellular carcinoma ([facility] 3/ 44        Types: Cigarettes        Quit date: 2019        Years since quittin.4      Smokeless tobacco: Former User        Types: Snuff      Tobacco comment: 2ppd for 20yrs, 1ppd for 10 years, 10 cigs per day for the last few years    Alcohol use: 34.3 (L) 07/19/2019    .8 (H) 07/19/2019    MCH 36.2 (H) 07/19/2019    MCHC 35.6 07/19/2019    RDW 14.8 07/19/2019    PLT 38.0 (L) 07/19/2019    PLT 42.0 (L) 07/05/2019    PLT 47.0 (L) 06/21/2019     Lab Results   Component Value Date     (H) 12/17/2018     Lab Results   Component Value Date    ESRML 37 (H) 06/07/2019    ESRML 25 (H) 12/17/2018     Lab Results   Component Value Date    CRP <0.29 06/07/2019    CRP <0.29 12/17/2018     Lab Results   Component Value Date     06/07/2019    L had a good response to nivolumab as well based on improvement on CT.   -continue to observe on serial scans- next CT in 3 months (end of sept). Based on response may consider SBRT at some point.       *anemia, leukopenia, thrombocytopenia  -due to liver dys

## 2019-07-19 NOTE — PROGRESS NOTES
Outpatient Oncology Care Plan  Problem list:  neutropenia  thrombocytopenia  knowledge deficit    Problems related to:    disease/disease progression  side effect of treatment    Interventions:  monitor for signs/symptoms of infection  monitor lab values

## 2019-08-02 ENCOUNTER — APPOINTMENT (OUTPATIENT)
Dept: HEMATOLOGY/ONCOLOGY | Age: 59
End: 2019-08-02
Attending: INTERNAL MEDICINE
Payer: COMMERCIAL

## 2019-08-08 ENCOUNTER — TELEPHONE (OUTPATIENT)
Dept: HEMATOLOGY/ONCOLOGY | Facility: HOSPITAL | Age: 59
End: 2019-08-08

## 2019-08-12 ENCOUNTER — TELEPHONE (OUTPATIENT)
Dept: HEMATOLOGY/ONCOLOGY | Facility: HOSPITAL | Age: 59
End: 2019-08-12

## 2019-08-16 ENCOUNTER — OFFICE VISIT (OUTPATIENT)
Dept: HEMATOLOGY/ONCOLOGY | Age: 59
End: 2019-08-16
Attending: INTERNAL MEDICINE
Payer: COMMERCIAL

## 2019-08-16 VITALS
OXYGEN SATURATION: 98 % | WEIGHT: 219.38 LBS | RESPIRATION RATE: 18 BRPM | TEMPERATURE: 98 F | SYSTOLIC BLOOD PRESSURE: 105 MMHG | DIASTOLIC BLOOD PRESSURE: 64 MMHG | HEART RATE: 65 BPM | BODY MASS INDEX: 30 KG/M2

## 2019-08-16 DIAGNOSIS — C22.0 HEPATOCELLULAR CARCINOMA (HCC): Primary | ICD-10-CM

## 2019-08-16 DIAGNOSIS — Z63.4 EXPECTED BEREAVEMENT DUE TO LIFE EVENT: ICD-10-CM

## 2019-08-16 DIAGNOSIS — K70.31 ALCOHOLIC CIRRHOSIS OF LIVER WITH ASCITES (HCC): ICD-10-CM

## 2019-08-16 DIAGNOSIS — D69.6 THROMBOCYTOPENIA (HCC): ICD-10-CM

## 2019-08-16 DIAGNOSIS — C34.91 PRIMARY LUNG SQUAMOUS CELL CARCINOMA, RIGHT (HCC): ICD-10-CM

## 2019-08-16 DIAGNOSIS — R79.89 ELEVATED LIVER FUNCTION TESTS: ICD-10-CM

## 2019-08-16 DIAGNOSIS — D61.818 PANCYTOPENIA (HCC): ICD-10-CM

## 2019-08-16 DIAGNOSIS — K59.09 OTHER CONSTIPATION: ICD-10-CM

## 2019-08-16 DIAGNOSIS — D70.9 NEUTROPENIA, UNSPECIFIED TYPE (HCC): ICD-10-CM

## 2019-08-16 DIAGNOSIS — B18.2 CHRONIC HEPATITIS C WITH CIRRHOSIS (HCC): ICD-10-CM

## 2019-08-16 DIAGNOSIS — K74.60 CHRONIC HEPATITIS C WITH CIRRHOSIS (HCC): ICD-10-CM

## 2019-08-16 DIAGNOSIS — F17.210 CIGARETTE SMOKER: ICD-10-CM

## 2019-08-16 DIAGNOSIS — G47.09 OTHER INSOMNIA: ICD-10-CM

## 2019-08-16 DIAGNOSIS — F10.11 ALCOHOL ABUSE, IN REMISSION: ICD-10-CM

## 2019-08-16 LAB
AFP-TM SERPL-MCNC: 17.7 NG/ML (ref ?–8)
ALBUMIN SERPL-MCNC: 2.6 G/DL (ref 3.4–5)
ALBUMIN/GLOB SERPL: 0.7 {RATIO} (ref 1–2)
ALP LIVER SERPL-CCNC: 191 U/L (ref 45–117)
ALT SERPL-CCNC: 37 U/L (ref 16–61)
ANION GAP SERPL CALC-SCNC: 7 MMOL/L (ref 0–18)
AST SERPL-CCNC: 39 U/L (ref 15–37)
BASOPHILS # BLD AUTO: 0.01 X10(3) UL (ref 0–0.2)
BASOPHILS NFR BLD AUTO: 0.7 %
BILIRUB SERPL-MCNC: 2.7 MG/DL (ref 0.1–2)
BUN BLD-MCNC: 8 MG/DL (ref 7–18)
BUN/CREAT SERPL: 11.4 (ref 10–20)
CALCIUM BLD-MCNC: 8 MG/DL (ref 8.5–10.1)
CHLORIDE SERPL-SCNC: 106 MMOL/L (ref 98–112)
CO2 SERPL-SCNC: 24 MMOL/L (ref 21–32)
CREAT BLD-MCNC: 0.7 MG/DL (ref 0.7–1.3)
DEPRECATED RDW RBC AUTO: 50.4 FL (ref 35.1–46.3)
EOSINOPHIL # BLD AUTO: 0.05 X10(3) UL (ref 0–0.7)
EOSINOPHIL NFR BLD AUTO: 3.4 %
ERYTHROCYTE [DISTWIDTH] IN BLOOD BY AUTOMATED COUNT: 13.6 % (ref 11–15)
GLOBULIN PLAS-MCNC: 3.7 G/DL (ref 2.8–4.4)
GLUCOSE BLD-MCNC: 235 MG/DL (ref 70–99)
HCT VFR BLD AUTO: 33 % (ref 39–53)
HGB BLD-MCNC: 11.6 G/DL (ref 13–17.5)
IMM GRANULOCYTES # BLD AUTO: 0 X10(3) UL (ref 0–1)
IMM GRANULOCYTES NFR BLD: 0 %
INR BLD: 1.58 (ref 0.9–1.1)
LYMPHOCYTES # BLD AUTO: 0.51 X10(3) UL (ref 1–4)
LYMPHOCYTES NFR BLD AUTO: 34.2 %
M PROTEIN MFR SERPL ELPH: 6.3 G/DL (ref 6.4–8.2)
MCH RBC QN AUTO: 35.5 PG (ref 26–34)
MCHC RBC AUTO-ENTMCNC: 35.2 G/DL (ref 31–37)
MCV RBC AUTO: 100.9 FL (ref 80–100)
MONOCYTES # BLD AUTO: 0.17 X10(3) UL (ref 0.1–1)
MONOCYTES NFR BLD AUTO: 11.4 %
NEUTROPHILS # BLD AUTO: 0.75 X10 (3) UL (ref 1.5–7.7)
NEUTROPHILS # BLD AUTO: 0.75 X10(3) UL (ref 1.5–7.7)
NEUTROPHILS NFR BLD AUTO: 50.3 %
OSMOLALITY SERPL CALC.SUM OF ELEC: 290 MOSM/KG (ref 275–295)
PLATELET # BLD AUTO: 39 10(3)UL (ref 150–450)
POTASSIUM SERPL-SCNC: 3.7 MMOL/L (ref 3.5–5.1)
PSA SERPL DL<=0.01 NG/ML-MCNC: 19.3 SECONDS (ref 12.2–14.4)
RBC # BLD AUTO: 3.27 X10(6)UL (ref 4.3–5.7)
SODIUM SERPL-SCNC: 137 MMOL/L (ref 136–145)
TSI SER-ACNC: 1.01 MIU/ML (ref 0.36–3.74)
WBC # BLD AUTO: 1.5 X10(3) UL (ref 4–11)

## 2019-08-16 PROCEDURE — 99215 OFFICE O/P EST HI 40 MIN: CPT | Performed by: INTERNAL MEDICINE

## 2019-08-16 PROCEDURE — 96413 CHEMO IV INFUSION 1 HR: CPT

## 2019-08-16 RX ORDER — SPIRONOLACTONE 50 MG/1
50 TABLET, FILM COATED ORAL DAILY
Qty: 30 TABLET | Refills: 3 | Status: SHIPPED | OUTPATIENT
Start: 2019-08-16 | End: 2019-11-12

## 2019-08-16 SDOH — SOCIAL STABILITY - SOCIAL INSECURITY: DISSAPEARANCE AND DEATH OF FAMILY MEMBER: Z63.4

## 2019-08-16 NOTE — PROGRESS NOTES
Pt here for C10D1.   Arrives Ambulating independently, accompanied by Self          Modifications in dose or schedule: No     Frequency of blood return and site check throughout administration: Prior to administration   Discharged to Home, Ambulating indepe

## 2019-08-16 NOTE — PROGRESS NOTES
Hematology/Oncology Clinic Follow Up Visit    Patient Name: Haleigh Birmingham Record Number: [id number]    YOB: 1960    PCP: Dr. Adeline Medrano   Other providers: Dr. Tapia Poster (liver)    Reason for Consultation:  Gerry mendez nivolumab  -6/7/19- cycle 6 nivolumab  -6/21/19- cycle 7 nivolumab  -6/28/19- CT c/a/p- Significant decrease in the size of the RUL lobe spiculated lung mass (21 x 23mm -> 11 x 14mm).   Decrease in size of heterogeneously enhancing mass in segment 5 of the Continues to smoke 5-6 cig/day. Motivated to keep this down, not ready to quit.      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      Social History    Tobacco Use      Smoking status: Former Smoker        Packs/day: 1.00        Years: 45.00        Pack years: 39        Types: Cigarettes        Quit date: 2019        Years since quittin.4      Smokeles WBC 1.7 (L) 07/05/2019    HGB 11.6 (L) 08/16/2019    HGB 12.2 (L) 07/19/2019    HGB 11.4 (L) 07/05/2019    HCT 33.0 (L) 08/16/2019    .9 (H) 08/16/2019    MCH 35.5 (H) 08/16/2019    MCHC 35.2 08/16/2019    RDW 13.6 08/16/2019    PLT 39.0 (L) 08/16/2 FOLIC 7.5 (L) 54/26/2663    FOLIC 7.7 63/89/2612     Lab Results   Component Value Date    COPPER 92 12/17/2018     Lab Results   Component Value Date    ESRML 37 (H) 06/07/2019    ESRML 25 (H) 12/17/2018     Lab Results   Component Value Date    CRP <0.29 treatments    *NSCLC, squamous cell type  -clinical stage T1N0.   This is not his most pressing concern but has also had a good response to nivolumab as well based on improvement on CT.   -continue to observe on serial scans- next CT in 3 months (end of sep

## 2019-08-20 ENCOUNTER — TELEPHONE (OUTPATIENT)
Dept: HEMATOLOGY/ONCOLOGY | Facility: HOSPITAL | Age: 59
End: 2019-08-20

## 2019-08-20 NOTE — TELEPHONE ENCOUNTER
Pt called that he was hospitalized at [facility] yesterday until today for a dog bite. Pt states he was given IV abx and then sent home with two oral abx ceftin and flagyl. Pt was told by doctors at the hospital to f/up with Dr. Jay Jay Patel this week.

## 2019-08-21 NOTE — TELEPHONE ENCOUNTER
MD Brie Ortiz, Brett Aase, RN   Caller: Unspecified Lakhwinder Britton,  4:02 PM)             Please have an APN see him. Tatiana Reyes      Called pt LVM on home and cell to call back to [facility] APN either tomorrow or Friday in .

## 2019-08-23 ENCOUNTER — NURSE ONLY (OUTPATIENT)
Dept: HEMATOLOGY/ONCOLOGY | Age: 59
End: 2019-08-23
Attending: INTERNAL MEDICINE
Payer: COMMERCIAL

## 2019-08-23 ENCOUNTER — OFFICE VISIT (OUTPATIENT)
Dept: HEMATOLOGY/ONCOLOGY | Age: 59
End: 2019-08-23
Attending: INTERNAL MEDICINE
Payer: COMMERCIAL

## 2019-08-23 VITALS
OXYGEN SATURATION: 100 % | SYSTOLIC BLOOD PRESSURE: 112 MMHG | DIASTOLIC BLOOD PRESSURE: 66 MMHG | RESPIRATION RATE: 20 BRPM | HEART RATE: 66 BPM | BODY MASS INDEX: 30 KG/M2 | WEIGHT: 225 LBS | TEMPERATURE: 98 F

## 2019-08-23 DIAGNOSIS — C22.0 HEPATOCELLULAR CARCINOMA (HCC): ICD-10-CM

## 2019-08-23 DIAGNOSIS — S01.25XD DOG BITE OF ALA NASI, SUBSEQUENT ENCOUNTER: ICD-10-CM

## 2019-08-23 DIAGNOSIS — C22.0 HEPATOCELLULAR CARCINOMA (HCC): Primary | ICD-10-CM
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
## 2019-08-23 DIAGNOSIS — D70.9 NEUTROPENIA, UNSPECIFIED TYPE (HCC): ICD-10-CM

## 2019-08-23 LAB
ALBUMIN SERPL-MCNC: 2.6 G/DL (ref 3.4–5)
ALBUMIN/GLOB SERPL: 0.7 {RATIO} (ref 1–2)
ALP LIVER SERPL-CCNC: 210 U/L (ref 45–117)
ALT SERPL-CCNC: 31 U/L (ref 16–61)
ANION GAP SERPL CALC-SCNC: 4 MMOL/L (ref 0–18)
AST SERPL-CCNC: 37 U/L (ref 15–37)
BASOPHILS # BLD AUTO: 0.01 X10(3) UL (ref 0–0.2)
BASOPHILS NFR BLD AUTO: 0.6 %
BILIRUB SERPL-MCNC: 1.5 MG/DL (ref 0.1–2)
BUN BLD-MCNC: 5 MG/DL (ref 7–18)
BUN/CREAT SERPL: 7.1 (ref 10–20)
CALCIUM BLD-MCNC: 8.2 MG/DL (ref 8.5–10.1)
CHLORIDE SERPL-SCNC: 107 MMOL/L (ref 98–112)
CO2 SERPL-SCNC: 27 MMOL/L (ref 21–32)
CREAT BLD-MCNC: 0.7 MG/DL (ref 0.7–1.3)
DEPRECATED RDW RBC AUTO: 51.8 FL (ref 35.1–46.3)
EOSINOPHIL # BLD AUTO: 0.1 X10(3) UL (ref 0–0.7)
EOSINOPHIL NFR BLD AUTO: 5.7 %
ERYTHROCYTE [DISTWIDTH] IN BLOOD BY AUTOMATED COUNT: 14 % (ref 11–15)
GLOBULIN PLAS-MCNC: 3.6 G/DL (ref 2.8–4.4)
GLUCOSE BLD-MCNC: 169 MG/DL (ref 70–99)
HCT VFR BLD AUTO: 29.9 % (ref 39–53)
HGB BLD-MCNC: 10.5 G/DL (ref 13–17.5)
IMM GRANULOCYTES # BLD AUTO: 0.01 X10(3) UL (ref 0–1)
IMM GRANULOCYTES NFR BLD: 0.6 %
LYMPHOCYTES # BLD AUTO: 0.56 X10(3) UL (ref 1–4)
LYMPHOCYTES NFR BLD AUTO: 32 %
M PROTEIN MFR SERPL ELPH: 6.2 G/DL (ref 6.4–8.2)
MCH RBC QN AUTO: 35.6 PG (ref 26–34)
MCHC RBC AUTO-ENTMCNC: 35.1 G/DL (ref 31–37)
MCV RBC AUTO: 101.4 FL (ref 80–100)
MONOCYTES # BLD AUTO: 0.17 X10(3) UL (ref 0.1–1)
MONOCYTES NFR BLD AUTO: 9.7 %
NEUTROPHILS # BLD AUTO: 0.9 X10 (3) UL (ref 1.5–7.7)
NEUTROPHILS # BLD AUTO: 0.9 X10(3) UL (ref 1.5–7.7)
NEUTROPHILS NFR BLD AUTO: 51.4 %
OSMOLALITY SERPL CALC.SUM OF ELEC: 287 MOSM/KG (ref 275–295)
PLATELET # BLD AUTO: 45 10(3)UL (ref 150–450)
POTASSIUM SERPL-SCNC: 4.2 MMOL/L (ref 3.5–5.1)
RBC # BLD AUTO: 2.95 X10(6)UL (ref 4.3–5.7)
SODIUM SERPL-SCNC: 138 MMOL/L (ref 136–145)
WBC # BLD AUTO: 1.8 X10(3) UL (ref 4–11)

## 2019-08-23 PROCEDURE — 85025 COMPLETE CBC W/AUTO DIFF WBC: CPT

## 2019-08-23 PROCEDURE — 99213 OFFICE O/P EST LOW 20 MIN: CPT | Performed by: CLINICAL NURSE SPECIALIST

## 2019-08-23 PROCEDURE — 36415 COLL VENOUS BLD VENIPUNCTURE: CPT

## 2019-08-23 PROCEDURE — 80053 COMPREHEN METABOLIC PANEL: CPT

## 2019-08-23 RX ORDER — CEFUROXIME AXETIL 500 MG/1
TABLET ORAL 2 TIMES DAILY
COMMUNITY
End: 2019-08-30 | Stop reason: ALTCHOICE

## 2019-08-23 RX ORDER — METRONIDAZOLE 500 MG/1
500 TABLET ORAL 3 TIMES DAILY
COMMUNITY
End: 2019-08-30 | Stop reason: ALTCHOICE

## 2019-08-23 NOTE — PROGRESS NOTES
ANP Visit Note    Patient Name: Jules Sánchez   YOB: 1960   Medical Record Number: [id number]   CSN: [id number]   Date of visit: 8/23/2019   Bob Hahn DO   No primary care provider on file.      Chief Complaint/Reason for Visit:  Delfin Diaz nivolumab  -5/24/19- cycle 5 nivolumab  -6/7/19- cycle 6 nivolumab  -6/21/19- cycle 7 nivolumab  -6/28/19- CT c/a/p- Significant decrease in the size of the RUL lobe spiculated lung mass (21 x 23mm -> 11 x 14mm).   Decrease in size of heterogeneously enhanc of 40.0-44.9, adult [facility])     Cigarette smoker     Chronic cough     Influenza vaccination declined     Acute recurrent pansinusitis     Thrombocytopenia (HCC)     Severe obesity (BMI 35.0-39. 9)     Hypoalbuminemia due to protein-calorie malnutrition ([address] Socioeconomic History      Marital status: Single      Spouse name: Not on file      Number of children: Not on file      Years of education: Not on file      Highest education level: Not on file    Occupational History      Not on file    Social Needs Occupational Exposure: Not Asked        Hobby Hazards: Not Asked        Sleep Concern: Not Asked        Stress Concern: Not Asked        Weight Concern: Not Asked        Special Diet: Not Asked        Back Care: Not Asked        Exercise: Yes          acti intact. PERRL. Oropharynx is clear. Neck: No JVD. No palpable lymphadenopathy. Neck is supple. Chest: Clear to auscultation. Heart: Regular rate and rhythm. Abdomen: Soft, non tender with good bowel sounds.   Extremities: RIght leg with 6 puncture hol (L) 1.00 - 4.00 x10(3) uL    Monocyte Absolute 0.17 0.10 - 1.00 x10(3) uL    Eosinophil Absolute 0.10 0.00 - 0.70 x10(3) uL    Basophil Absolute 0.01 0.00 - 0.20 x10(3) uL    Immature Granulocyte Absolute 0.01 0.00 - 1.00 x10(3) uL    Neutrophil % 51.4 %

## 2019-08-30 ENCOUNTER — OFFICE VISIT (OUTPATIENT)
Dept: HEMATOLOGY/ONCOLOGY | Age: 59
End: 2019-08-30
Attending: INTERNAL MEDICINE
Payer: COMMERCIAL

## 2019-08-30 VITALS
BODY MASS INDEX: 30 KG/M2 | WEIGHT: 222.38 LBS | HEART RATE: 67 BPM | OXYGEN SATURATION: 99 % | RESPIRATION RATE: 20 BRPM | TEMPERATURE: 97 F | SYSTOLIC BLOOD PRESSURE: 131 MMHG | DIASTOLIC BLOOD PRESSURE: 77 MMHG
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
## 2019-08-30 DIAGNOSIS — F10.11 ALCOHOL ABUSE, IN REMISSION: ICD-10-CM

## 2019-08-30 DIAGNOSIS — K74.60 CHRONIC HEPATITIS C WITH CIRRHOSIS (HCC): ICD-10-CM

## 2019-08-30 DIAGNOSIS — C34.91 PRIMARY LUNG SQUAMOUS CELL CARCINOMA, RIGHT (HCC): ICD-10-CM

## 2019-08-30 DIAGNOSIS — C22.0 HEPATOCELLULAR CARCINOMA (HCC): Primary | ICD-10-CM

## 2019-08-30 DIAGNOSIS — B18.2 CHRONIC HEPATITIS C WITH CIRRHOSIS (HCC): ICD-10-CM

## 2019-08-30 DIAGNOSIS — S81.851D DOG BITE OF RIGHT LOWER LEG, SUBSEQUENT ENCOUNTER: ICD-10-CM

## 2019-08-30 DIAGNOSIS — G47.09 OTHER INSOMNIA: ICD-10-CM

## 2019-08-30 DIAGNOSIS — M79.672 LEFT FOOT PAIN: ICD-10-CM

## 2019-08-30 DIAGNOSIS — D61.818 PANCYTOPENIA (HCC): ICD-10-CM

## 2019-08-30 DIAGNOSIS — F17.210 CIGARETTE SMOKER: ICD-10-CM

## 2019-08-30 PROBLEM — S81.851A DOG BITE OF RIGHT LOWER LEG: Status: ACTIVE | Noted: 2019-08-30
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
## 2019-08-30 LAB
AFP-TM SERPL-MCNC: 18.2 NG/ML (ref ?–8)
ALBUMIN SERPL-MCNC: 2.7 G/DL (ref 3.4–5)
ALBUMIN/GLOB SERPL: 0.7 {RATIO} (ref 1–2)
ALP LIVER SERPL-CCNC: 183 U/L (ref 45–117)
ALT SERPL-CCNC: 29 U/L (ref 16–61)
ANION GAP SERPL CALC-SCNC: 6 MMOL/L (ref 0–18)
AST SERPL-CCNC: 46 U/L (ref 15–37)
BASOPHILS # BLD AUTO: 0.01 X10(3) UL (ref 0–0.2)
BASOPHILS NFR BLD AUTO: 0.5 %
BILIRUB SERPL-MCNC: 2.5 MG/DL (ref 0.1–2)
BUN BLD-MCNC: 6 MG/DL (ref 7–18)
BUN/CREAT SERPL: 9 (ref 10–20)
CALCIUM BLD-MCNC: 8.1 MG/DL (ref 8.5–10.1)
CHLORIDE SERPL-SCNC: 107 MMOL/L (ref 98–112)
CO2 SERPL-SCNC: 25 MMOL/L (ref 21–32)
CREAT BLD-MCNC: 0.67 MG/DL (ref 0.7–1.3)
DEPRECATED RDW RBC AUTO: 54.9 FL (ref 35.1–46.3)
EOSINOPHIL # BLD AUTO: 0.08 X10(3) UL (ref 0–0.7)
EOSINOPHIL NFR BLD AUTO: 4.4 %
ERYTHROCYTE [DISTWIDTH] IN BLOOD BY AUTOMATED COUNT: 14.5 % (ref 11–15)
GLOBULIN PLAS-MCNC: 3.8 G/DL (ref 2.8–4.4)
GLUCOSE BLD-MCNC: 162 MG/DL (ref 70–99)
HCT VFR BLD AUTO: 32.2 % (ref 39–53)
HGB BLD-MCNC: 11.2 G/DL (ref 13–17.5)
IMM GRANULOCYTES # BLD AUTO: 0.01 X10(3) UL (ref 0–1)
IMM GRANULOCYTES NFR BLD: 0.5 %
INR BLD: 1.62 (ref 0.9–1.1)
LYMPHOCYTES # BLD AUTO: 0.57 X10(3) UL (ref 1–4)
LYMPHOCYTES NFR BLD AUTO: 31.1 %
M PROTEIN MFR SERPL ELPH: 6.5 G/DL (ref 6.4–8.2)
MCH RBC QN AUTO: 35.7 PG (ref 26–34)
MCHC RBC AUTO-ENTMCNC: 34.8 G/DL (ref 31–37)
MCV RBC AUTO: 102.5 FL (ref 80–100)
MONOCYTES # BLD AUTO: 0.19 X10(3) UL (ref 0.1–1)
MONOCYTES NFR BLD AUTO: 10.4 %
NEUTROPHILS # BLD AUTO: 0.97 X10 (3) UL (ref 1.5–7.7)
NEUTROPHILS # BLD AUTO: 0.97 X10(3) UL (ref 1.5–7.7)
NEUTROPHILS NFR BLD AUTO: 53.1 %
OSMOLALITY SERPL CALC.SUM OF ELEC: 287 MOSM/KG (ref 275–295)
PLATELET # BLD AUTO: 48 10(3)UL (ref 150–450)
POTASSIUM SERPL-SCNC: 4.1 MMOL/L (ref 3.5–5.1)
PSA SERPL DL<=0.01 NG/ML-MCNC: 19.7 SECONDS (ref 12.2–14.4)
RBC # BLD AUTO: 3.14 X10(6)UL (ref 4.3–5.7)
SODIUM SERPL-SCNC: 138 MMOL/L (ref 136–145)
WBC # BLD AUTO: 1.8 X10(3) UL (ref 4–11)

## 2019-08-30 PROCEDURE — 96413 CHEMO IV INFUSION 1 HR: CPT

## 2019-08-30 PROCEDURE — 99215 OFFICE O/P EST HI 40 MIN: CPT | Performed by: INTERNAL MEDICINE

## 2019-08-30 NOTE — PROGRESS NOTES
Hematology/Oncology Clinic Follow Up Visit    Patient Name: Haleigh Birmingham Record Number: [id number]    YOB: 1960    PCP: Dr. Bob Hahn   Other providers: Dr. Jennifer Mauro (liver)    Reason for Consultation:  Jules mendez nivolumab  -6/7/19- cycle 6 nivolumab  -6/21/19- cycle 7 nivolumab  -6/28/19- CT c/a/p- Significant decrease in the size of the RUL lobe spiculated lung mass (21 x 23mm -> 11 x 14mm).   Decrease in size of heterogeneously enhancing mass in segment 5 of the History:   Diagnosis Date   • Alcoholism /alcohol abuse ([facility] [address] 9/23/2014   • Chronic cough 5/8/2015   • Cigarette smoker 5/8/2015    50 pack year h/o smoking    • Elevated liver function tests 7/24/2013   • Family history of bladder cancer 9/23/2014   • Hep Years: 45.00        Pack years: 39        Types: Cigarettes        Quit date: 2019        Years since quittin.5      Smokeless tobacco: Former User        Types: Snuff      Tobacco comment: 2ppd for 20yrs, 1ppd for 10 years, 10 cigs per day for th (L) 08/23/2019    HGB 11.6 (L) 08/16/2019    HCT 32.2 (L) 08/30/2019    .5 (H) 08/30/2019    MCH 35.7 (H) 08/30/2019    MCHC 34.8 08/30/2019    RDW 14.5 08/30/2019    PLT 48.0 (L) 08/30/2019    PLT 45.0 (L) 08/23/2019    PLT 39.0 (L) 08/16/2019   AN FOLIC 7.7 96/55/3717     Lab Results   Component Value Date    COPPER 92 12/17/2018     Lab Results   Component Value Date    ESRML 37 (H) 06/07/2019    ESRML 25 (H) 12/17/2018     Lab Results   Component Value Date    CRP <0.29 06/07/2019    CRP <0.29 12/ T1N0.  This is not his most pressing concern but has also had a good response to nivolumab as well based on improvement on CT.   -continue to observe on serial scans- next CT after cycle 12 in 3 months (end of sept).  Based on response may consider SBRT at

## 2019-08-30 NOTE — PROGRESS NOTES
Pt here for C11D1.   Arrives Ambulating independently, accompanied by Self           Modifications in dose or schedule: No     Frequency of blood return and site check throughout administration: Prior to administration   Discharged to Home, Ambulating indep

## 2019-08-30 NOTE — PROGRESS NOTES
Outpatient Oncology Care Plan  Problem list:  fatigue  knowledge deficit  weight loss    Problems related to:    chemotherapy  disease/disease progression  side effect of treatment    Interventions:  optimize nutrition status  provided nutrition support  e

## 2019-09-13 ENCOUNTER — OFFICE VISIT (OUTPATIENT)
Dept: HEMATOLOGY/ONCOLOGY | Age: 59
End: 2019-09-13
Attending: INTERNAL MEDICINE
Payer: COMMERCIAL

## 2019-09-13 VITALS
WEIGHT: 218.69 LBS | RESPIRATION RATE: 20 BRPM | TEMPERATURE: 97 F | DIASTOLIC BLOOD PRESSURE: 69 MMHG | SYSTOLIC BLOOD PRESSURE: 113 MMHG | OXYGEN SATURATION: 97 % | HEART RATE: 73 BPM | BODY MASS INDEX: 30 KG/M2
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
## 2019-09-13 DIAGNOSIS — G47.09 OTHER INSOMNIA: ICD-10-CM

## 2019-09-13 DIAGNOSIS — F10.11 ALCOHOL ABUSE, IN REMISSION: ICD-10-CM

## 2019-09-13 DIAGNOSIS — F17.210 CIGARETTE SMOKER: ICD-10-CM

## 2019-09-13 DIAGNOSIS — D69.6 THROMBOCYTOPENIA (HCC): ICD-10-CM

## 2019-09-13 DIAGNOSIS — K74.60 CHRONIC HEPATITIS C WITH CIRRHOSIS (HCC): ICD-10-CM

## 2019-09-13 DIAGNOSIS — M79.672 LEFT FOOT PAIN: ICD-10-CM

## 2019-09-13 DIAGNOSIS — C34.91 PRIMARY LUNG SQUAMOUS CELL CARCINOMA, RIGHT (HCC): ICD-10-CM

## 2019-09-13 DIAGNOSIS — C22.0 HEPATOCELLULAR CARCINOMA (HCC): Primary | ICD-10-CM

## 2019-09-13 DIAGNOSIS — R16.1 SPLENOMEGALY: ICD-10-CM

## 2019-09-13 DIAGNOSIS — D61.818 PANCYTOPENIA (HCC): ICD-10-CM

## 2019-09-13 DIAGNOSIS — S81.851D DOG BITE OF RIGHT LOWER LEG, SUBSEQUENT ENCOUNTER: ICD-10-CM

## 2019-09-13 DIAGNOSIS — B18.2 CHRONIC HEPATITIS C WITH CIRRHOSIS (HCC): ICD-10-CM

## 2019-09-13 LAB
AFP-TM SERPL-MCNC: 18 NG/ML (ref ?–8)
ALBUMIN SERPL-MCNC: 2.7 G/DL (ref 3.4–5)
ALBUMIN/GLOB SERPL: 0.7 {RATIO} (ref 1–2)
ALP LIVER SERPL-CCNC: 210 U/L (ref 45–117)
ALT SERPL-CCNC: 27 U/L (ref 16–61)
ANION GAP SERPL CALC-SCNC: 6 MMOL/L (ref 0–18)
AST SERPL-CCNC: 36 U/L (ref 15–37)
BASOPHILS # BLD AUTO: 0.01 X10(3) UL (ref 0–0.2)
BASOPHILS NFR BLD AUTO: 0.9 %
BILIRUB SERPL-MCNC: 1.8 MG/DL (ref 0.1–2)
BUN BLD-MCNC: 8 MG/DL (ref 7–18)
BUN/CREAT SERPL: 12.1 (ref 10–20)
CALCIUM BLD-MCNC: 8.3 MG/DL (ref 8.5–10.1)
CHLORIDE SERPL-SCNC: 105 MMOL/L (ref 98–112)
CO2 SERPL-SCNC: 26 MMOL/L (ref 21–32)
CREAT BLD-MCNC: 0.66 MG/DL (ref 0.7–1.3)
DEPRECATED RDW RBC AUTO: 53.1 FL (ref 35.1–46.3)
EOSINOPHIL # BLD AUTO: 0.05 X10(3) UL (ref 0–0.7)
EOSINOPHIL NFR BLD AUTO: 4.5 %
ERYTHROCYTE [DISTWIDTH] IN BLOOD BY AUTOMATED COUNT: 14.3 % (ref 11–15)
GLOBULIN PLAS-MCNC: 3.9 G/DL (ref 2.8–4.4)
GLUCOSE BLD-MCNC: 187 MG/DL (ref 70–99)
HCT VFR BLD AUTO: 31.1 % (ref 39–53)
HGB BLD-MCNC: 11 G/DL (ref 13–17.5)
IMM GRANULOCYTES # BLD AUTO: 0.01 X10(3) UL (ref 0–1)
IMM GRANULOCYTES NFR BLD: 0.9 %
INR BLD: 1.49 (ref 0.9–1.1)
LYMPHOCYTES # BLD AUTO: 0.38 X10(3) UL (ref 1–4)
LYMPHOCYTES NFR BLD AUTO: 34.2 %
M PROTEIN MFR SERPL ELPH: 6.6 G/DL (ref 6.4–8.2)
MCH RBC QN AUTO: 35.8 PG (ref 26–34)
MCHC RBC AUTO-ENTMCNC: 35.4 G/DL (ref 31–37)
MCV RBC AUTO: 101.3 FL (ref 80–100)
MONOCYTES # BLD AUTO: 0.14 X10(3) UL (ref 0.1–1)
MONOCYTES NFR BLD AUTO: 12.6 %
NEUTROPHILS # BLD AUTO: 0.52 X10 (3) UL (ref 1.5–7.7)
NEUTROPHILS # BLD AUTO: 0.52 X10(3) UL (ref 1.5–7.7)
NEUTROPHILS NFR BLD AUTO: 46.9 %
OSMOLALITY SERPL CALC.SUM OF ELEC: 287 MOSM/KG (ref 275–295)
PLATELET # BLD AUTO: 37 10(3)UL (ref 150–450)
POTASSIUM SERPL-SCNC: 3.9 MMOL/L (ref 3.5–5.1)
PSA SERPL DL<=0.01 NG/ML-MCNC: 18.4 SECONDS (ref 12.2–14.4)
RBC # BLD AUTO: 3.07 X10(6)UL (ref 4.3–5.7)
SODIUM SERPL-SCNC: 137 MMOL/L (ref 136–145)
TSI SER-ACNC: 1.71 MIU/ML (ref 0.36–3.74)
WBC # BLD AUTO: 1.1 X10(3) UL (ref 4–11)

## 2019-09-13 PROCEDURE — 96413 CHEMO IV INFUSION 1 HR: CPT

## 2019-09-13 PROCEDURE — 99215 OFFICE O/P EST HI 40 MIN: CPT | Performed by: INTERNAL MEDICINE

## 2019-09-13 NOTE — PROGRESS NOTES
Pt here for C12D1.   Arrives Ambulating independently, accompanied by Self           Patient denies possible pregnancy since last therapy cycle: Not Applicable    Modifications in dose or schedule: No     Frequency of blood return and site check throughout

## 2019-09-13 NOTE — PROGRESS NOTES
Hematology/Oncology Clinic Follow Up Visit    Patient Name: Haleigh Birmingham Record Number: [id number]    YOB: 1960    PCP: Dr. Toby Rodriguez   Other providers: Dr. Arina Mosley (liver)    Reason for Consultation:  Sonja mendez nivolumab  -6/7/19- cycle 6 nivolumab  -6/21/19- cycle 7 nivolumab  -6/28/19- CT c/a/p- Significant decrease in the size of the RUL lobe spiculated lung mass (21 x 23mm -> 11 x 14mm).   Decrease in size of heterogeneously enhancing mass in segment 5 of the 5/8/2015   • Cigarette smoker 5/8/2015    50 pack year h/o smoking    • Elevated liver function tests 7/24/2013   • Family history of bladder cancer 9/23/2014   • [facility] 3/20/2019   • High blood pressure    • Hypoalbuminemia due to Years since quittin.5      Smokeless tobacco: Former User        Types: Snuff      Tobacco comment: 2ppd for 20yrs, 1ppd for 10 years, 10 cigs per day for the last few years    Alcohol use: No      Alcohol/week: 2.0 - 4.0 standard drinks      Types: 2 09/13/2019    MCH 35.8 (H) 09/13/2019    MCHC 35.4 09/13/2019    RDW 14.3 09/13/2019    PLT 37.0 (L) 09/13/2019    PLT 48.0 (L) 08/30/2019    PLT 45.0 (L) 08/23/2019     Lab Results   Component Value Date     (H) 09/13/2019    BUN 8 09/13/201 Component Value Date    COPPER 92 12/17/2018     Lab Results   Component Value Date    ESRML 37 (H) 06/07/2019    ESRML 25 (H) 12/17/2018     Lab Results   Component Value Date    CRP <0.29 06/07/2019    CRP <0.29 12/17/2018     Lab Results   Component V treatments    *NSCLC, squamous cell type  -clinical stage T1N0. This is not his most pressing concern but has also had a good response to nivolumab as well based on improvement on CT.   -continue to observe on serial scans- repeat CT next month as above.

## 2019-09-18 ENCOUNTER — OFFICE VISIT (OUTPATIENT)
Dept: FAMILY MEDICINE CLINIC | Facility: CLINIC | Age: 59
End: 2019-09-18
Payer: COMMERCIAL

## 2019-09-18 VITALS
SYSTOLIC BLOOD PRESSURE: 112 MMHG | DIASTOLIC BLOOD PRESSURE: 68 MMHG | BODY MASS INDEX: 30 KG/M2 | HEART RATE: 63 BPM | WEIGHT: 221 LBS
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
## 2019-09-18 DIAGNOSIS — I10 ESSENTIAL HYPERTENSION, BENIGN: Primary | ICD-10-CM

## 2019-09-18 DIAGNOSIS — S81.851D DOG BITE OF RIGHT LOWER LEG, SUBSEQUENT ENCOUNTER: ICD-10-CM

## 2019-09-18 PROCEDURE — 99213 OFFICE O/P EST LOW 20 MIN: CPT | Performed by: FAMILY MEDICINE

## 2019-09-18 NOTE — PROGRESS NOTES
[facility]  [address], [address]  [address]  [phone] ([phone]) cough 5/8/2015   • Cigarette smoker 5/8/2015    50 pack year h/o smoking    • Elevated liver function tests 7/24/2013   • Family history of bladder cancer 9/23/2014   • [facility] 3/20/2019   • High blood pressure    • Hypoalbuminemia d Packs/day: 1.00        Years: 45.00        Pack years: 39        Types: Cigarettes        Quit date: 2019        Years since quittin.5      Smokeless tobacco: Former User        Types: Snuff      Tobacco comment: 2ppd for 20yrs, 1ppd for 10 years, ARVIND  Nurse Practitioner  [facility] [address]  [place] [address], 7th floor, [facility], [address] [place] [address] (office)  [phone] (fax)  [email or web address]

## 2019-09-18 NOTE — PROGRESS NOTES
Feng Sesay is a 60year old male. HPI:       Dog bite:  Right shin area. Much improved. No pain, no redness, no drainage. HTN:    Controlled.    Severity is mild-mod, pt is on 3 agents to control his HTN, carvedilol, furosemide, and spironol 8/22/2017   • Unspecified essential hypertension    • Visual impairment     glasses      Past Surgical History:   Procedure Laterality Date   • BIOPSY OF SKIN LESION      face and leg   • IR VARICOSE VEIN ENDOVENOUS LASER ABLATION(CPT=36478)  2018      Soc sounds, NT/ND, no pulsations, no r/r/g, no masses, no HSM  NEURO: Alert and Oriented x3, CN II-XII grossly intact, no focal weakness  PSYCH: affect normal, normal thought content. DATA:    Dr. Abram Matta note dated 9/18/2019 reviewed.       ASSESSMENT AND

## 2019-09-21 RX ORDER — CARVEDILOL 12.5 MG/1
12.5 TABLET ORAL 2 TIMES DAILY WITH MEALS
Qty: 180 TABLET | Refills: 3 | Status: ON HOLD | OUTPATIENT
Start: 2019-09-21 | End: 2020-01-01

## 2019-09-27 ENCOUNTER — OFFICE VISIT (OUTPATIENT)
Dept: HEMATOLOGY/ONCOLOGY | Age: 59
End: 2019-09-27
Attending: INTERNAL MEDICINE
Payer: COMMERCIAL

## 2019-09-27 VITALS
BODY MASS INDEX: 31 KG/M2 | SYSTOLIC BLOOD PRESSURE: 135 MMHG | RESPIRATION RATE: 20 BRPM | HEART RATE: 73 BPM | OXYGEN SATURATION: 98 % | DIASTOLIC BLOOD PRESSURE: 82 MMHG | WEIGHT: 225.31 LBS

## 2019-09-27 DIAGNOSIS — C34.91 PRIMARY LUNG SQUAMOUS CELL CARCINOMA, RIGHT (HCC): ICD-10-CM

## 2019-09-27 DIAGNOSIS — F10.11 ALCOHOL ABUSE, IN REMISSION: ICD-10-CM

## 2019-09-27 DIAGNOSIS — M79.672 LEFT FOOT PAIN: ICD-10-CM

## 2019-09-27 DIAGNOSIS — C22.0 HEPATOCELLULAR CARCINOMA (HCC): Primary | ICD-10-CM

## 2019-09-27 DIAGNOSIS — K74.60 CHRONIC HEPATITIS C WITH CIRRHOSIS (HCC): ICD-10-CM

## 2019-09-27 DIAGNOSIS — D69.6 THROMBOCYTOPENIA (HCC): ICD-10-CM

## 2019-09-27 DIAGNOSIS — F17.210 CIGARETTE SMOKER: ICD-10-CM

## 2019-09-27 DIAGNOSIS — B18.2 CHRONIC HEPATITIS C WITH CIRRHOSIS (HCC): ICD-10-CM

## 2019-09-27 DIAGNOSIS — G47.09 OTHER INSOMNIA: ICD-10-CM

## 2019-09-27 DIAGNOSIS — D61.818 PANCYTOPENIA (HCC): ICD-10-CM
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
## 2019-09-27 PROBLEM — S81.851A DOG BITE OF RIGHT LOWER LEG: Status: RESOLVED | Noted: 2019-08-30 | Resolved: 2019-09-27

## 2019-09-27 LAB
AFP-TM SERPL-MCNC: 16.2 NG/ML (ref ?–8)
ALBUMIN SERPL-MCNC: 2.9 G/DL (ref 3.4–5)
ALBUMIN/GLOB SERPL: 0.8 {RATIO} (ref 1–2)
ALP LIVER SERPL-CCNC: 213 U/L (ref 45–117)
ALT SERPL-CCNC: 29 U/L (ref 16–61)
ANION GAP SERPL CALC-SCNC: 6 MMOL/L (ref 0–18)
AST SERPL-CCNC: 35 U/L (ref 15–37)
BASOPHILS # BLD AUTO: 0.01 X10(3) UL (ref 0–0.2)
BASOPHILS NFR BLD AUTO: 0.6 %
BILIRUB SERPL-MCNC: 1.7 MG/DL (ref 0.1–2)
BUN BLD-MCNC: 7 MG/DL (ref 7–18)
BUN/CREAT SERPL: 10.4 (ref 10–20)
CALCIUM BLD-MCNC: 8.2 MG/DL (ref 8.5–10.1)
CHLORIDE SERPL-SCNC: 107 MMOL/L (ref 98–112)
CO2 SERPL-SCNC: 27 MMOL/L (ref 21–32)
CREAT BLD-MCNC: 0.67 MG/DL (ref 0.7–1.3)
DEPRECATED RDW RBC AUTO: 51.8 FL (ref 35.1–46.3)
EOSINOPHIL # BLD AUTO: 0.06 X10(3) UL (ref 0–0.7)
EOSINOPHIL NFR BLD AUTO: 3.5 %
ERYTHROCYTE [DISTWIDTH] IN BLOOD BY AUTOMATED COUNT: 14 % (ref 11–15)
GLOBULIN PLAS-MCNC: 3.7 G/DL (ref 2.8–4.4)
GLUCOSE BLD-MCNC: 127 MG/DL (ref 70–99)
HCT VFR BLD AUTO: 33.1 % (ref 39–53)
HGB BLD-MCNC: 11.6 G/DL (ref 13–17.5)
IMM GRANULOCYTES # BLD AUTO: 0 X10(3) UL (ref 0–1)
IMM GRANULOCYTES NFR BLD: 0 %
INR BLD: 1.56 (ref 0.9–1.1)
LYMPHOCYTES # BLD AUTO: 0.55 X10(3) UL (ref 1–4)
LYMPHOCYTES NFR BLD AUTO: 32 %
M PROTEIN MFR SERPL ELPH: 6.6 G/DL (ref 6.4–8.2)
MCH RBC QN AUTO: 35.5 PG (ref 26–34)
MCHC RBC AUTO-ENTMCNC: 35 G/DL (ref 31–37)
MCV RBC AUTO: 101.2 FL (ref 80–100)
MONOCYTES # BLD AUTO: 0.2 X10(3) UL (ref 0.1–1)
MONOCYTES NFR BLD AUTO: 11.6 %
NEUTROPHILS # BLD AUTO: 0.9 X10 (3) UL (ref 1.5–7.7)
NEUTROPHILS # BLD AUTO: 0.9 X10(3) UL (ref 1.5–7.7)
NEUTROPHILS NFR BLD AUTO: 52.3 %
OSMOLALITY SERPL CALC.SUM OF ELEC: 290 MOSM/KG (ref 275–295)
PLATELET # BLD AUTO: 45 10(3)UL (ref 150–450)
POTASSIUM SERPL-SCNC: 4.1 MMOL/L (ref 3.5–5.1)
PSA SERPL DL<=0.01 NG/ML-MCNC: 19.1 SECONDS (ref 12.2–14.4)
RBC # BLD AUTO: 3.27 X10(6)UL (ref 4.3–5.7)
SODIUM SERPL-SCNC: 140 MMOL/L (ref 136–145)
TSI SER-ACNC: 1.6 MIU/ML (ref 0.36–3.74)
WBC # BLD AUTO: 1.7 X10(3) UL (ref 4–11)

## 2019-09-27 PROCEDURE — 96413 CHEMO IV INFUSION 1 HR: CPT

## 2019-09-27 PROCEDURE — 99215 OFFICE O/P EST HI 40 MIN: CPT | Performed by: INTERNAL MEDICINE

## 2019-09-27 NOTE — PROGRESS NOTES
Outpatient Oncology Care Plan  Problem list:  loss of appetite  fatigue  knowledge deficit    Problems related to:    disease/disease progression  side effect of treatment    Interventions:  provided general teaching    Expected outcomes:  understands plan

## 2019-09-27 NOTE — PROGRESS NOTES
Pt here for C13 Opdivo.   Arrives Ambulating independently, accompanied by Self           Patient denies possible pregnancy since last therapy cycle: Not Applicable    Modifications in dose or schedule: No     Frequency of blood return and site check throug

## 2019-09-27 NOTE — PROGRESS NOTES
Hematology/Oncology Clinic Follow Up Visit    Patient Name: Haleigh Birmingham Record Number: [id number]    YOB: 1960    PCP: Dr. Dhruv Gamez   Other providers: Dr. Benoit Lester (liver)    Reason for Consultation:  Rowena mendez nivolumab  -6/7/19- cycle 6 nivolumab  -6/21/19- cycle 7 nivolumab  -6/28/19- CT c/a/p- Significant decrease in the size of the RUL lobe spiculated lung mass (21 x 23mm -> 11 x 14mm).   Decrease in size of heterogeneously enhancing mass in segment 5 of the increased dyspnea, cough, or chest pain. Sleep is doing okay with lunesta. Continues to smoke but is cutting down, now usually less than 1 cig/day. Motivated to keep this down. Keeping busy taking small jobs has helped.   He hasn't had any etoh since No children. Works as a technician specialist- fixes machines, HVAC and plumbing, maintenance.      Social History    Tobacco Use      Smoking status: Former Smoker        Packs/day: 1.00        Years: 45.00        Pack years: 45        Types: Cigarettes 09/27/2019    WBC 1.1 (L) 09/13/2019    WBC 1.8 (L) 08/30/2019    HGB 11.6 (L) 09/27/2019    HGB 11.0 (L) 09/13/2019    HGB 11.2 (L) 08/30/2019    HCT 33.1 (L) 09/27/2019    .2 (H) 09/27/2019    MCH 35.5 (H) 09/27/2019    MCHC 35.0 09/27/2019    RDW B12 966 09/23/2014     Lab Results   Component Value Date    MMA 0.21 12/17/2018     Lab Results   Component Value Date    FOLIC 7.5 (L) 48/99/3934    FOLIC 7.7 88/61/4984     Lab Results   Component Value Date    COPPER 92 12/17/2018     Lab Results fluid  -s/p multiple therapeutic paracenteses, though since responded to immunotherapy hasn't had any further reaccumulation of ascites.   -avoiding etoh  -Following with Dr. Ja Casas  -following CMP q2 weeks with treatments    *NSCLC, squamous cell type  -cl

## 2019-10-11 ENCOUNTER — OFFICE VISIT (OUTPATIENT)
Dept: HEMATOLOGY/ONCOLOGY | Age: 59
End: 2019-10-11
Attending: INTERNAL MEDICINE
Payer: COMMERCIAL

## 2019-10-11 VITALS
OXYGEN SATURATION: 98 % | TEMPERATURE: 99 F | RESPIRATION RATE: 20 BRPM | SYSTOLIC BLOOD PRESSURE: 133 MMHG | BODY MASS INDEX: 30 KG/M2 | WEIGHT: 221.81 LBS | HEART RATE: 69 BPM | DIASTOLIC BLOOD PRESSURE: 75 MMHG

## 2019-10-11 DIAGNOSIS — C34.91 PRIMARY LUNG SQUAMOUS CELL CARCINOMA, RIGHT (HCC): ICD-10-CM

## 2019-10-11 DIAGNOSIS — F17.210 CIGARETTE SMOKER: ICD-10-CM

## 2019-10-11 DIAGNOSIS — C22.0 HEPATOCELLULAR CARCINOMA (HCC): Primary | ICD-10-CM

## 2019-10-11 DIAGNOSIS — K74.60 CHRONIC HEPATITIS C WITH CIRRHOSIS (HCC): ICD-10-CM

## 2019-10-11 DIAGNOSIS — D70.9 NEUTROPENIA, UNSPECIFIED TYPE (HCC): ICD-10-CM

## 2019-10-11 DIAGNOSIS — G47.09 OTHER INSOMNIA: ICD-10-CM

## 2019-10-11 DIAGNOSIS — B18.2 CHRONIC HEPATITIS C WITH CIRRHOSIS (HCC): ICD-10-CM

## 2019-10-11 DIAGNOSIS — K59.09 OTHER CONSTIPATION: ICD-10-CM

## 2019-10-11 DIAGNOSIS — D61.818 PANCYTOPENIA (HCC): ICD-10-CM

## 2019-10-11 DIAGNOSIS — F10.11 ALCOHOL ABUSE, IN REMISSION: ICD-10-CM

## 2019-10-11 DIAGNOSIS — D69.6 THROMBOCYTOPENIA (HCC): ICD-10-CM

## 2019-10-11 PROCEDURE — 99215 OFFICE O/P EST HI 40 MIN: CPT | Performed by: INTERNAL MEDICINE

## 2019-10-11 PROCEDURE — 96413 CHEMO IV INFUSION 1 HR: CPT

## 2019-10-11 NOTE — PROGRESS NOTES
Outpatient Oncology Care Plan  Problem list:  fatigue  knowledge deficit     Problems related to:    disease/disease progression     Interventions:  provided general teaching     Expected outcomes:  understands plan of care     Progress towards outcome:  m

## 2019-10-11 NOTE — PROGRESS NOTES
Pt here forC14 Opdivo.   Arrives Ambulating independently, accompanied by Self           Patient denies possible pregnancy since last therapy cycle: Not Applicable    Modifications in dose or schedule: No     Frequency of blood return and site check through

## 2019-10-11 NOTE — PROGRESS NOTES
Hematology/Oncology Clinic Follow Up Visit    Patient Name: Haleigh Birmingham Record Number: [id number]    YOB: 1960    PCP: Dr. Berenice Pallas   Other providers: Dr. Austin Gutierrez (liver)    Reason for Consultation:  Terra mendez nivolumab  -6/7/19- cycle 6 nivolumab  -6/21/19- cycle 7 nivolumab  -6/28/19- CT c/a/p- Significant decrease in the size of the RUL lobe spiculated lung mass (21 x 23mm -> 11 x 14mm).   Decrease in size of heterogeneously enhancing mass in segment 5 of the His bowels have been regular. He denies any nausea or vomiting. No increased dyspnea, cough, or chest pain. Sleep is doing okay with lunesta. Continues to smoke but is remains down to usually less than 1 cig/day. Motivated to keep this down.   Ke Take  by mouth. Disp:  Rfl:      Allergies:     Penicillins             HIVES    Psychosocial History:  Social History    Social History Narrative      , lives with his wife. No children.   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   Continue spiron requiring close monitoring    Tommie Ken MD  Hematology/Medical [address] [facility]

## 2019-10-16 ENCOUNTER — HOSPITAL ENCOUNTER (OUTPATIENT)
Dept: CT IMAGING | Facility: HOSPITAL | Age: 59
Discharge: HOME | End: 2019-10-16
Attending: INTERNAL MEDICINE
Payer: COMMERCIAL

## 2019-10-16 DIAGNOSIS — C22.0 HEPATOCELLULAR CARCINOMA (HCC): ICD-10-CM

## 2019-10-16 DIAGNOSIS — C34.91 PRIMARY LUNG SQUAMOUS CELL CARCINOMA, RIGHT (HCC): ICD-10-CM

## 2019-10-16 PROCEDURE — 71260 CT THORAX DX C+: CPT | Performed by: INTERNAL MEDICINE

## 2019-10-16 PROCEDURE — 74178 CT ABD&PLV WO CNTR FLWD CNTR: CPT | Performed by: INTERNAL MEDICINE

## 2019-10-25 ENCOUNTER — OFFICE VISIT (OUTPATIENT)
Dept: HEMATOLOGY/ONCOLOGY | Age: 59
End: 2019-10-25
Attending: INTERNAL MEDICINE
Payer: COMMERCIAL

## 2019-10-25 VITALS
DIASTOLIC BLOOD PRESSURE: 74 MMHG | BODY MASS INDEX: 30.64 KG/M2 | TEMPERATURE: 98 F | HEIGHT: 72.05 IN | SYSTOLIC BLOOD PRESSURE: 131 MMHG | WEIGHT: 226.19 LBS | HEART RATE: 91 BPM | RESPIRATION RATE: 20 BRPM | OXYGEN SATURATION: 98 %

## 2019-10-25 DIAGNOSIS — G47.09 OTHER INSOMNIA: ICD-10-CM

## 2019-10-25 DIAGNOSIS — F17.210 CIGARETTE SMOKER: ICD-10-CM

## 2019-10-25 DIAGNOSIS — D61.818 PANCYTOPENIA (HCC): ICD-10-CM

## 2019-10-25 DIAGNOSIS — F10.11 ALCOHOL ABUSE, IN REMISSION: ICD-10-CM

## 2019-10-25 DIAGNOSIS — C22.0 HEPATOCELLULAR CARCINOMA (HCC): Primary | ICD-10-CM

## 2019-10-25 DIAGNOSIS — C34.91 PRIMARY LUNG SQUAMOUS CELL CARCINOMA, RIGHT (HCC): ICD-10-CM

## 2019-10-25 DIAGNOSIS — D69.6 THROMBOCYTOPENIA (HCC): ICD-10-CM

## 2019-10-25 DIAGNOSIS — D70.9 NEUTROPENIA, UNSPECIFIED TYPE (HCC): ICD-10-CM

## 2019-10-25 DIAGNOSIS — K74.60 CHRONIC HEPATITIS C WITH CIRRHOSIS (HCC): ICD-10-CM

## 2019-10-25 DIAGNOSIS — B18.2 CHRONIC HEPATITIS C WITH CIRRHOSIS (HCC): ICD-10-CM

## 2019-10-25 PROBLEM — Z63.4: Status: RESOLVED | Noted: 2019-08-16 | Resolved: 2019-10-25

## 2019-10-25 PROCEDURE — 85610 PROTHROMBIN TIME: CPT

## 2019-10-25 PROCEDURE — 80053 COMPREHEN METABOLIC PANEL: CPT

## 2019-10-25 PROCEDURE — 36415 COLL VENOUS BLD VENIPUNCTURE: CPT

## 2019-10-25 PROCEDURE — 82105 ALPHA-FETOPROTEIN SERUM: CPT

## 2019-10-25 PROCEDURE — 85025 COMPLETE CBC W/AUTO DIFF WBC: CPT

## 2019-10-25 PROCEDURE — 96413 CHEMO IV INFUSION 1 HR: CPT

## 2019-10-25 PROCEDURE — 99215 OFFICE O/P EST HI 40 MIN: CPT | Performed by: INTERNAL MEDICINE

## 2019-10-25 NOTE — PROGRESS NOTES
Hematology/Oncology Clinic Follow Up Visit    Patient Name: Haleigh Birmingham Record Number: [id number]    YOB: 1960    PCP: Dr. Yolanda Knox   Other providers: Dr. Pat Soto (liver)    Reason for Consultation:  Asad mendez nivolumab  -6/7/19- cycle 6 nivolumab  -6/21/19- cycle 7 nivolumab  -6/28/19- CT c/a/p- Significant decrease in the size of the RUL lobe spiculated lung mass (21 x 23mm -> 11 x 14mm).   Decrease in size of heterogeneously enhancing mass in segment 5 of the his mood is fine. Is taking spironolactone every few days which seems to control his fluid retention adequately. No recurrence of the breast tenderness. His bowels have been regular. He denies any nausea or vomiting.   No increased dyspnea, cough, o mouth daily. , Disp: 90 tablet, Rfl: 3  furosemide 40 MG Oral Tab, Take 1 tablet (40 mg total) by mouth 2 (two) times daily. , Disp: 60 tablet, Rfl: 6  Multiple Vitamins-Minerals (EQL ONE DAILY MENS 50+ ADVANCE) Oral Tab, Take  by mouth., Disp: , Rfl: oz)    ECOG PS: 1    Physical Examination:  General: Patient is alert and oriented, not in acute distress  Psych: Mood and affect are appropriate  Eyes: EOMI  ENT: Oropharynx is clear  CV: Regular rate and rhythm, no murmurs  Respiratory: Lungs clear to au 23. 5 (H) 07/05/2019    AFPTM 27.6 (H) 06/21/2019    AFPTM 36.4 (H) 06/07/2019    AFPTM 117.9 (H) 05/24/2019    AFPTM 442.5 (H) 05/10/2019    AFPTM 2,006.4 (H) 04/26/2019    AFPTM 9,617.9 (H) 04/12/2019    AFPTM 30,440.0 (H) 03/27/2019    AFPTM 76,506.3 (H) Impression & Plan:     *Infiltrative hepatocellular carcinoma, unresectable  -have started first line nivolumab with palliative intent on 3/28/19 given simultaneous diagnosis of both liver and lung cancers and poor candidate for TKIs given underlying l miralax prn    *smoking  -will continue to limit smoking. I have previously encouraged his wife that their chances of successful quitting is much higher if they both quit.   Will continue to readdress at future visits    *insomnia  -continue lunesta 2mg QH

## 2019-10-25 NOTE — PROGRESS NOTES
Pt here for C15D1.   Arrives Ambulating independently, accompanied by Self           Patient denies possible pregnancy since last therapy cycle: Not Applicable    Modifications in dose or schedule: No     Frequency of blood return and site check throughout

## 2019-10-25 NOTE — PROGRESS NOTES
Outpatient Oncology Care Plan  Problem list:  fatigue  knowledge deficit    Problems related to:    disease/disease progression  side effect of treatment    Interventions:  provided general teaching    Expected outcomes:  understands plan of care    Progre

## 2019-11-08 ENCOUNTER — OFFICE VISIT (OUTPATIENT)
Dept: HEMATOLOGY/ONCOLOGY | Age: 59
End: 2019-11-08
Attending: INTERNAL MEDICINE
Payer: COMMERCIAL

## 2019-11-08 VITALS
DIASTOLIC BLOOD PRESSURE: 79 MMHG | RESPIRATION RATE: 18 BRPM | BODY MASS INDEX: 31.29 KG/M2 | HEART RATE: 91 BPM | TEMPERATURE: 98 F | HEIGHT: 72.05 IN | SYSTOLIC BLOOD PRESSURE: 124 MMHG | OXYGEN SATURATION: 99 % | WEIGHT: 231 LBS

## 2019-11-08 DIAGNOSIS — IMO0001 ALCOHOLISM /ALCOHOL ABUSE: ICD-10-CM

## 2019-11-08 DIAGNOSIS — K74.60 CHRONIC HEPATITIS C WITH CIRRHOSIS (HCC): ICD-10-CM

## 2019-11-08 DIAGNOSIS — F17.210 CIGARETTE SMOKER: ICD-10-CM

## 2019-11-08 DIAGNOSIS — C22.0 HEPATOCELLULAR CARCINOMA (HCC): Primary | ICD-10-CM

## 2019-11-08 DIAGNOSIS — R60.0 BILATERAL LOWER EXTREMITY EDEMA: ICD-10-CM

## 2019-11-08 DIAGNOSIS — B18.2 CHRONIC HEPATITIS C WITH CIRRHOSIS (HCC): ICD-10-CM

## 2019-11-08 DIAGNOSIS — G47.09 OTHER INSOMNIA: ICD-10-CM

## 2019-11-08 DIAGNOSIS — D69.6 THROMBOCYTOPENIA (HCC): ICD-10-CM

## 2019-11-08 DIAGNOSIS — C34.91 PRIMARY LUNG SQUAMOUS CELL CARCINOMA, RIGHT (HCC): ICD-10-CM

## 2019-11-08 DIAGNOSIS — D61.818 PANCYTOPENIA (HCC): ICD-10-CM

## 2019-11-08 DIAGNOSIS — R79.89 ELEVATED LIVER FUNCTION TESTS: ICD-10-CM

## 2019-11-08 DIAGNOSIS — K08.89 PAIN, DENTAL: ICD-10-CM

## 2019-11-08 PROCEDURE — 96413 CHEMO IV INFUSION 1 HR: CPT

## 2019-11-08 PROCEDURE — 84443 ASSAY THYROID STIM HORMONE: CPT

## 2019-11-08 PROCEDURE — 99215 OFFICE O/P EST HI 40 MIN: CPT | Performed by: INTERNAL MEDICINE

## 2019-11-08 NOTE — PROGRESS NOTES
Hematology/Oncology Clinic Follow Up Visit    Patient Name: Haleigh Birmingham Record Number: [id number]    YOB: 1960    PCP: Dr. Fabio Freire   Other providers: Dr. Oleksandr Ascencio (liver)    Reason for Consultation:  Nereida mendez nivolumab  -6/7/19- cycle 6 nivolumab  -6/21/19- cycle 7 nivolumab  -6/28/19- CT c/a/p- Significant decrease in the size of the RUL lobe spiculated lung mass (21 x 23mm -> 11 x 14mm).   Decrease in size of heterogeneously enhancing mass in segment 5 of the Continues only smoke 1 cigarette a day, his wife also has reduced her smoking to the same quantity. They are keeping busy remodeling their home. No fevers, infections, or bleeding. Energy is good, his mood is fine.    Is taking spironolactone every few d 3  furosemide 40 MG Oral Tab, Take 1 tablet (40 mg total) by mouth 2 (two) times daily. , Disp: 60 tablet, Rfl: 6  Multiple Vitamins-Minerals (EQL ONE DAILY MENS 50+ ADVANCE) Oral Tab, Take  by mouth., Disp: , Rfl:       Allergies:     Penicillins Patient is alert and oriented, not in acute distress  Psych: Mood and affect are appropriate  Eyes: EOMI  ENT: Oropharynx is clear  CV: Regular rate and rhythm, no murmurs  Respiratory: Lungs clear to auscultation bilaterally  GI/Abd: Nontender, minimal as AFPTM 27.6 (H) 06/21/2019    AFPTM 36.4 (H) 06/07/2019    AFPTM 117.9 (H) 05/24/2019    AFPTM 442.5 (H) 05/10/2019    AFPTM 2,006.4 (H) 04/26/2019    AFPTM 9,617.9 (H) 04/12/2019    AFPTM 30,440.0 (H) 03/27/2019    AFPTM 35,194.9 (H) 03/16/2019    AFPTM 3, Impression & Plan:     *Infiltrative hepatocellular carcinoma, unresectable  -have started first line nivolumab with palliative intent on 3/28/19 given simultaneous diagnosis of both liver and lung cancers and poor candidate for TKIs given underlying l today.  If worsens will need to consider a bmbx  -CBC w/ diff in 2 weeks    *constipation  -better. Cont colace, miralax prn    *smoking  -will continue to limit smoking.   I have previously encouraged his wife that their chances of successful quitting is m

## 2019-11-12 DIAGNOSIS — K70.31 ALCOHOLIC CIRRHOSIS OF LIVER WITH ASCITES (HCC): ICD-10-CM

## 2019-11-12 RX ORDER — SPIRONOLACTONE 50 MG/1
TABLET, FILM COATED ORAL
Qty: 90 TABLET | Refills: 1 | Status: SHIPPED | OUTPATIENT
Start: 2019-11-12 | End: 2020-01-01

## 2019-11-18 RX ORDER — ESZOPICLONE 1 MG/1
TABLET, FILM COATED ORAL
Qty: 60 TABLET | Refills: 3 | Status: SHIPPED | OUTPATIENT
Start: 2019-11-18 | End: 2019-12-20

## 2019-11-22 ENCOUNTER — OFFICE VISIT (OUTPATIENT)
Dept: HEMATOLOGY/ONCOLOGY | Age: 59
End: 2019-11-22
Attending: INTERNAL MEDICINE
Payer: COMMERCIAL

## 2019-11-22 VITALS
HEIGHT: 72.05 IN | WEIGHT: 231.19 LBS | OXYGEN SATURATION: 99 % | TEMPERATURE: 98 F | SYSTOLIC BLOOD PRESSURE: 126 MMHG | DIASTOLIC BLOOD PRESSURE: 73 MMHG | HEART RATE: 56 BPM | RESPIRATION RATE: 18 BRPM | BODY MASS INDEX: 31.31 KG/M2

## 2019-11-22 DIAGNOSIS — F10.11 ALCOHOL ABUSE, IN REMISSION: ICD-10-CM

## 2019-11-22 DIAGNOSIS — C34.91 PRIMARY LUNG SQUAMOUS CELL CARCINOMA, RIGHT (HCC): ICD-10-CM

## 2019-11-22 DIAGNOSIS — D69.6 THROMBOCYTOPENIA (HCC): ICD-10-CM

## 2019-11-22 DIAGNOSIS — F17.210 CIGARETTE SMOKER: ICD-10-CM

## 2019-11-22 DIAGNOSIS — C22.0 HEPATOCELLULAR CARCINOMA (HCC): Primary | ICD-10-CM

## 2019-11-22 DIAGNOSIS — B18.2 CHRONIC HEPATITIS C WITH CIRRHOSIS (HCC): ICD-10-CM

## 2019-11-22 DIAGNOSIS — K74.60 CHRONIC HEPATITIS C WITH CIRRHOSIS (HCC): ICD-10-CM

## 2019-11-22 DIAGNOSIS — D61.818 PANCYTOPENIA (HCC): ICD-10-CM

## 2019-11-22 DIAGNOSIS — G47.09 OTHER INSOMNIA: ICD-10-CM

## 2019-11-22 PROCEDURE — 96413 CHEMO IV INFUSION 1 HR: CPT

## 2019-11-22 PROCEDURE — 99215 OFFICE O/P EST HI 40 MIN: CPT | Performed by: INTERNAL MEDICINE

## 2019-11-22 NOTE — PROGRESS NOTES
Hematology/Oncology Clinic Follow Up Visit    Patient Name: Haleigh Birmingham Record Number: [id number]    YOB: 1960    PCP: Dr. Shalonda Jacinto   Other providers: Dr. Elizabeth Mullins (liver)    Reason for Consultation:  Az mendez nivolumab  -6/7/19- cycle 6 nivolumab  -6/21/19- cycle 7 nivolumab  -6/28/19- CT c/a/p- Significant decrease in the size of the RUL lobe spiculated lung mass (21 x 23mm -> 11 x 14mm).   Decrease in size of heterogeneously enhancing mass in segment 5 of the infections, or bleeding. Energy is good, his mood is fine. Hasn't needed to take spironolactone lately as hasn't had fluid retention adequately. His bowels have been regular. He denies any nausea or vomiting.   No increased dyspnea, cough, or chest daily., Disp: 90 tablet, Rfl: 3  furosemide 40 MG Oral Tab, Take 1 tablet (40 mg total) by mouth 2 (two) times daily. , Disp: 60 tablet, Rfl: 6  Multiple Vitamins-Minerals (EQL ONE DAILY MENS 50+ ADVANCE) Oral Tab, Take  by mouth., Disp: , Rfl:       Allerg PS: 1    Physical Examination:  General: Patient is alert and oriented, not in acute distress  Psych: Mood and affect are appropriate  Eyes: EOMI  ENT: Oropharynx is clear  CV: Regular rate and rhythm, no murmurs  Respiratory: Lungs clear to auscultation b 07/19/2019    AFPTM 23.5 (H) 07/05/2019    AFPTM 27.6 (H) 06/21/2019    AFPTM 36.4 (H) 06/07/2019    AFPTM 117.9 (H) 05/24/2019    AFPTM 442.5 (H) 05/10/2019    AFPTM 2,006.4 (H) 04/26/2019    AFPTM 9,617.9 (H) 04/12/2019    AFPTM 30,440.0 (H) 03/27/2019 07/19/2014    T4F 0.9 06/29/2013     Impression & Plan:     *Infiltrative hepatocellular carcinoma, unresectable  -have started first line nivolumab with palliative intent on 3/28/19 given simultaneous diagnosis of both liver and lung cancers and poor cand Not needing to take anything for this lately. *smoking  -will continue to limit smoking. I have previously encouraged his wife that their chances of successful quitting is much higher if they both quit.   Will continue to readdress at future visits

## 2019-11-22 NOTE — PROGRESS NOTES
Pt here for C17D1.   Arrives Ambulating independently, accompanied by Self           Patient denies possible pregnancy since last therapy cycle: Not Applicable    Modifications in dose or schedule: No     Frequency of blood return and site check throughout

## 2019-12-06 ENCOUNTER — OFFICE VISIT (OUTPATIENT)
Dept: HEMATOLOGY/ONCOLOGY | Age: 59
End: 2019-12-06
Attending: INTERNAL MEDICINE
Payer: COMMERCIAL

## 2019-12-06 VITALS
WEIGHT: 232 LBS | DIASTOLIC BLOOD PRESSURE: 79 MMHG | HEIGHT: 72.05 IN | TEMPERATURE: 98 F | OXYGEN SATURATION: 98 % | HEART RATE: 66 BPM | RESPIRATION RATE: 18 BRPM | SYSTOLIC BLOOD PRESSURE: 143 MMHG | BODY MASS INDEX: 31.42 KG/M2

## 2019-12-06 DIAGNOSIS — C22.0 HEPATOCELLULAR CARCINOMA (HCC): Primary | ICD-10-CM

## 2019-12-06 DIAGNOSIS — IMO0001 ALCOHOLISM /ALCOHOL ABUSE: ICD-10-CM

## 2019-12-06 DIAGNOSIS — C34.91 PRIMARY LUNG SQUAMOUS CELL CARCINOMA, RIGHT (HCC): ICD-10-CM

## 2019-12-06 DIAGNOSIS — B18.2 CHRONIC HEPATITIS C WITH CIRRHOSIS (HCC): ICD-10-CM

## 2019-12-06 DIAGNOSIS — M53.3 SACROCOCCYGEAL PAIN: ICD-10-CM

## 2019-12-06 DIAGNOSIS — D61.818 PANCYTOPENIA (HCC): ICD-10-CM

## 2019-12-06 DIAGNOSIS — G47.09 OTHER INSOMNIA: ICD-10-CM

## 2019-12-06 DIAGNOSIS — D69.6 THROMBOCYTOPENIA (HCC): ICD-10-CM

## 2019-12-06 DIAGNOSIS — K74.60 CHRONIC HEPATITIS C WITH CIRRHOSIS (HCC): ICD-10-CM

## 2019-12-06 PROCEDURE — 99215 OFFICE O/P EST HI 40 MIN: CPT | Performed by: INTERNAL MEDICINE

## 2019-12-06 PROCEDURE — 96413 CHEMO IV INFUSION 1 HR: CPT

## 2019-12-06 NOTE — PROGRESS NOTES
Hematology/Oncology Clinic Follow Up Visit    Patient Name: Haleigh Birmingham Record Number: [id number]    YOB: 1960    PCP: Dr. Ruth Fernandes   Other providers: Dr. Corin Chen (liver)    Reason for Consultation:  Obed mendez nivolumab  -6/7/19- cycle 6 nivolumab  -6/21/19- cycle 7 nivolumab  -6/28/19- CT c/a/p- Significant decrease in the size of the RUL lobe spiculated lung mass (21 x 23mm -> 11 x 14mm).   Decrease in size of heterogeneously enhancing mass in segment 5 of the periods of time. Pain is absent when he is walking or upright and less when lying down. He has not had any constipation or any bowel changes. He continues to feel well without any new issues otherwise.      Staying busy with his wife remodeling their kalyn does not qualify to have social determinant information on file (likely too young). Social History Narrative      , lives with his wife. No children. Works as a technician specialist- fixes machines, HVAC and plumbing, maintenance.      Social H hepatosplenomegaly  Neurological: Grossly intact    Lymphatics: No palpable lymphadenopathy  Skin: No rashes or petechiae  Ext: mild BLE edema, +large varicose veins present.   MSK: focal tenderness at tailbone to palpation but no changes, fluctuance, or an 04/26/2019    AFPTM 9,617.9 (H) 04/12/2019    AFPTM 30,440.0 (H) 03/27/2019    AFPTM 28,614.5 (H) 03/16/2019    AFPTM 3,785.7 (H) 01/21/2019     Lab Results   Component Value Date    REITCPERCENT 2.0 06/07/2019    REITCPERCENT 2.0 12/17/2018     Lab Result 3/28/19 given simultaneous diagnosis of both liver and lung cancers and poor candidate for TKIs given underlying liver dysfunction. Has had an excellent response to therapy thus far.   His last CT scan report suggested some progression of liver disease but continue to limit smoking. I have previously encouraged his wife that their chances of successful quitting is much higher if they both quit.   Will continue to readdress at future visits    *insomnia  -continue lunesta 2mg QHS prn    *gynecomastia/breast t

## 2019-12-06 NOTE — PROGRESS NOTES
Pt here for C18D1.   Arrives Ambulating independently, accompanied by Self           Patient reports possible pregnancy since last therapy cycle: Not Applicable    Modifications in dose or schedule: No     Frequency of blood return and site check throughout

## 2019-12-20 ENCOUNTER — OFFICE VISIT (OUTPATIENT)
Dept: HEMATOLOGY/ONCOLOGY | Age: 59
End: 2019-12-20
Attending: INTERNAL MEDICINE
Payer: COMMERCIAL

## 2019-12-20 ENCOUNTER — SOCIAL WORK SERVICES (OUTPATIENT)
Dept: HEMATOLOGY/ONCOLOGY | Facility: HOSPITAL | Age: 59
End: 2019-12-20

## 2019-12-20 VITALS
WEIGHT: 229 LBS | TEMPERATURE: 98 F | SYSTOLIC BLOOD PRESSURE: 148 MMHG | DIASTOLIC BLOOD PRESSURE: 91 MMHG | BODY MASS INDEX: 31.02 KG/M2 | HEART RATE: 66 BPM | OXYGEN SATURATION: 97 % | RESPIRATION RATE: 18 BRPM | HEIGHT: 72.05 IN

## 2019-12-20 DIAGNOSIS — B18.2 CHRONIC HEPATITIS C WITH CIRRHOSIS (HCC): ICD-10-CM

## 2019-12-20 DIAGNOSIS — K74.60 CHRONIC HEPATITIS C WITH CIRRHOSIS (HCC): ICD-10-CM

## 2019-12-20 DIAGNOSIS — D61.818 PANCYTOPENIA (HCC): ICD-10-CM

## 2019-12-20 DIAGNOSIS — D69.6 THROMBOCYTOPENIA (HCC): ICD-10-CM

## 2019-12-20 DIAGNOSIS — G47.09 OTHER INSOMNIA: ICD-10-CM

## 2019-12-20 DIAGNOSIS — IMO0001 ALCOHOLISM /ALCOHOL ABUSE: ICD-10-CM

## 2019-12-20 DIAGNOSIS — C34.91 PRIMARY LUNG SQUAMOUS CELL CARCINOMA, RIGHT (HCC): ICD-10-CM

## 2019-12-20 DIAGNOSIS — C22.0 HEPATOCELLULAR CARCINOMA (HCC): Primary | ICD-10-CM

## 2019-12-20 DIAGNOSIS — F17.210 CIGARETTE SMOKER: ICD-10-CM

## 2019-12-20 DIAGNOSIS — M53.3 SACROCOCCYGEAL PAIN: ICD-10-CM

## 2019-12-20 PROCEDURE — 96413 CHEMO IV INFUSION 1 HR: CPT

## 2019-12-20 PROCEDURE — 84443 ASSAY THYROID STIM HORMONE: CPT

## 2019-12-20 PROCEDURE — 99214 OFFICE O/P EST MOD 30 MIN: CPT | Performed by: INTERNAL MEDICINE

## 2019-12-20 RX ORDER — ESZOPICLONE 3 MG/1
3 TABLET, FILM COATED ORAL NIGHTLY
Qty: 30 TABLET | Refills: 3 | Status: SHIPPED | OUTPATIENT
Start: 2019-12-20 | End: 2020-01-01

## 2019-12-20 NOTE — PROGRESS NOTES
Pt here for C19D1.   Arrives Ambulating independently, accompanied by Self          Modifications in dose or schedule: No     Frequency of blood return and site check throughout administration: Prior to administration   Discharged to Home, Ambulating indepe

## 2019-12-20 NOTE — PROGRESS NOTES
ANDRES met with patient who reports that he was informed that his current insurance premium might be doubling next year. He wanted to preemptively discussed options.  As of now, patient has missed open enrollment, but could become eligible if he loses his insur

## 2019-12-20 NOTE — PROGRESS NOTES
Hematology/Oncology Clinic Follow Up Visit    Patient Name: Haleigh Birmingham Record Number: [id number]    YOB: 1960    PCP: Dr. Nette Callahan   Other providers: Dr. Matthew Elias (liver)    Reason for Consultation:  Jc mendez nivolumab  -6/7/19- cycle 6 nivolumab  -6/21/19- cycle 7 nivolumab  -6/28/19- CT c/a/p- Significant decrease in the size of the RUL lobe spiculated lung mass (21 x 23mm -> 11 x 14mm).   Decrease in size of heterogeneously enhancing mass in segment 5 of the significantly, nearly gone at this point. Sleep has been worse lately, having trouble falling asleep the last few nights. Occ caffeine use. Still smoking same amt. No changes in activity, still active remodeling his home.  Takes lunesta 2mg initially, b 1  carvedilol 12.5 MG Oral Tab, Take 1 tablet (12.5 mg total) by mouth 2 (two) times daily with meals. , Disp: 180 tablet, Rfl: 3  folic acid 1 MG Oral Tab, Take 1 tablet (1 mg total) by mouth daily. , Disp: 90 tablet, Rfl: 3  furosemide 40 MG Oral Tab, Take lb)  12/06/19 : 105.2 kg (232 lb)  11/22/19 : 104.9 kg (231 lb 3.2 oz)  11/08/19 : 104.8 kg (231 lb)  10/25/19 : 102.6 kg (226 lb 3.2 oz)  10/11/19 : 100.6 kg (221 lb 12.8 oz)    ECOG PS: 1    Physical Examination:  General: Patient is alert and oriented, AFPTM 17.3 (H) 11/22/2019    AFPTM 17.3 (H) 11/08/2019    AFPTM 18.7 (H) 10/25/2019    AFPTM 17.2 (H) 10/11/2019    AFPTM 16.2 (H) 09/27/2019    AFPTM 18.0 (H) 09/13/2019    AFPTM 18.2 (H) 08/30/2019    AFPTM 17.7 (H) 08/16/2019    AFPTM 16.8 (H) 07/19/201 06/21/2019    TSH 1.410 05/10/2019    TSH 3.590 03/27/2019    TSH 2.020 12/04/2018    TSH 1.560 10/25/2016    TSH 1.500 07/19/2014    TSH 1.35 06/29/2013     Lab Results   Component Value Date    T4F 1.0 12/04/2018    T4F 1.1 10/25/2016    T4F 1.2 07/19/20 DIC, splenomegaly, HCV, malignancy, folate def  -continue 1mg folic acid daily. -stable today  -CBC w/ diff in 2 weeks    *constipation  -better. Not needing to take anything for this lately. *smoking  -will continue to limit smoking.   I have previou

## 2020-01-01 ENCOUNTER — HOSPITAL DOCUMENTATION (OUTPATIENT)
Dept: SURGERY | Facility: CLINIC | Age: 60
End: 2020-01-01

## 2020-01-01 ENCOUNTER — OFFICE VISIT (OUTPATIENT)
Dept: HEMATOLOGY/ONCOLOGY | Age: 60
End: 2020-01-01
Attending: INTERNAL MEDICINE
Payer: COMMERCIAL

## 2020-01-01 ENCOUNTER — HOSPITAL ENCOUNTER (OUTPATIENT)
Dept: CT IMAGING | Age: 60
Discharge: HOME | End: 2020-01-01
Attending: INTERNAL MEDICINE
Payer: COMMERCIAL

## 2020-01-01 ENCOUNTER — APPOINTMENT (OUTPATIENT)
Dept: HEMATOLOGY/ONCOLOGY | Age: 60
End: 2020-01-01
Attending: INTERNAL MEDICINE
Payer: COMMERCIAL

## 2020-01-01 ENCOUNTER — HOSPITAL ENCOUNTER (OUTPATIENT)
Dept: ULTRASOUND IMAGING | Age: 60
Discharge: HOME | End: 2020-01-01
Attending: INTERNAL MEDICINE
Payer: COMMERCIAL

## 2020-01-01 ENCOUNTER — APPOINTMENT (OUTPATIENT)
Dept: MRI IMAGING | Facility: HOSPITAL | Age: 60
DRG: 264 | End: 2020-01-01
Attending: INTERNAL MEDICINE
Payer: COMMERCIAL

## 2020-01-01 ENCOUNTER — TELEPHONE (OUTPATIENT)
Dept: HEMATOLOGY/ONCOLOGY | Facility: HOSPITAL | Age: 60
End: 2020-01-01

## 2020-01-01 ENCOUNTER — ANESTHESIA (OUTPATIENT)
Dept: ENDOSCOPY | Facility: HOSPITAL | Age: 60
DRG: 378 | End: 2020-01-01
Payer: COMMERCIAL

## 2020-01-01 ENCOUNTER — TELEPHONE (OUTPATIENT)
Dept: SURGERY | Facility: CLINIC | Age: 60
End: 2020-01-01

## 2020-01-01 ENCOUNTER — OFFICE VISIT (OUTPATIENT)
Dept: SURGERY | Facility: CLINIC | Age: 60
End: 2020-01-01
Payer: COMMERCIAL

## 2020-01-01 ENCOUNTER — APPOINTMENT (OUTPATIENT)
Dept: CT IMAGING | Facility: HOSPITAL | Age: 60
DRG: 393 | End: 2020-01-01
Attending: EMERGENCY MEDICINE
Payer: COMMERCIAL

## 2020-01-01 ENCOUNTER — APPOINTMENT (OUTPATIENT)
Dept: LAB | Age: 60
End: 2020-01-01
Attending: INTERNAL MEDICINE
Payer: COMMERCIAL

## 2020-01-01 ENCOUNTER — PATIENT OUTREACH (OUTPATIENT)
Dept: CASE MANAGEMENT | Age: 60
End: 2020-01-01

## 2020-01-01 ENCOUNTER — HOSPITAL ENCOUNTER (INPATIENT)
Facility: HOSPITAL | Age: 60
LOS: 4 days | Discharge: HOME HEALTH | DRG: 371 | End: 2020-01-01
Attending: EMERGENCY MEDICINE | Admitting: HOSPITALIST
Payer: COMMERCIAL

## 2020-01-01 ENCOUNTER — TELEPHONE (OUTPATIENT)
Dept: FAMILY MEDICINE CLINIC | Facility: CLINIC | Age: 60
End: 2020-01-01

## 2020-01-01 ENCOUNTER — NURSE ONLY (OUTPATIENT)
Dept: SURGERY | Facility: CLINIC | Age: 60
End: 2020-01-01
Payer: COMMERCIAL

## 2020-01-01 ENCOUNTER — DIETICIAN VISIT (OUTPATIENT)
Dept: HEMATOLOGY/ONCOLOGY | Facility: HOSPITAL | Age: 60
End: 2020-01-01

## 2020-01-01 ENCOUNTER — ANESTHESIA (OUTPATIENT)
Dept: ENDOSCOPY | Facility: HOSPITAL | Age: 60
DRG: 371 | End: 2020-01-01
Payer: COMMERCIAL

## 2020-01-01 ENCOUNTER — HOSPITAL ENCOUNTER (OUTPATIENT)
Dept: CT IMAGING | Facility: HOSPITAL | Age: 60
Discharge: HOME | End: 2020-01-01
Attending: INTERNAL MEDICINE
Payer: COMMERCIAL

## 2020-01-01 ENCOUNTER — HOSPITAL ENCOUNTER (INPATIENT)
Facility: HOSPITAL | Age: 60
LOS: 2 days | Discharge: HOME | DRG: 602 | End: 2020-01-01
Attending: EMERGENCY MEDICINE | Admitting: HOSPITALIST
Payer: COMMERCIAL

## 2020-01-01 ENCOUNTER — ANESTHESIA (OUTPATIENT)
Dept: ENDOSCOPY | Facility: HOSPITAL | Age: 60
End: 2020-01-01
Payer: COMMERCIAL

## 2020-01-01 ENCOUNTER — APPOINTMENT (OUTPATIENT)
Dept: CT IMAGING | Facility: HOSPITAL | Age: 60
DRG: 371 | End: 2020-01-01
Attending: EMERGENCY MEDICINE
Payer: COMMERCIAL

## 2020-01-01 ENCOUNTER — OFFICE VISIT (OUTPATIENT)
Dept: HEMATOLOGY/ONCOLOGY | Facility: HOSPITAL | Age: 60
End: 2020-01-01
Attending: INTERNAL MEDICINE
Payer: COMMERCIAL

## 2020-01-01 ENCOUNTER — APPOINTMENT (OUTPATIENT)
Dept: GENERAL RADIOLOGY | Facility: HOSPITAL | Age: 60
DRG: 378 | End: 2020-01-01
Attending: EMERGENCY MEDICINE
Payer: COMMERCIAL

## 2020-01-01 ENCOUNTER — ANESTHESIA (OUTPATIENT)
Dept: SURGERY | Facility: HOSPITAL | Age: 60
DRG: 264 | End: 2020-01-01
Payer: COMMERCIAL

## 2020-01-01 ENCOUNTER — HOSPITAL ENCOUNTER (OUTPATIENT)
Facility: HOSPITAL | Age: 60
Setting detail: HOSPITAL OUTPATIENT SURGERY
Discharge: HOME | End: 2020-01-01
Attending: INTERNAL MEDICINE | Admitting: INTERNAL MEDICINE
Payer: COMMERCIAL

## 2020-01-01 ENCOUNTER — OFFICE VISIT (OUTPATIENT)
Dept: PODIATRY CLINIC | Facility: CLINIC | Age: 60
End: 2020-01-01
Payer: COMMERCIAL

## 2020-01-01 ENCOUNTER — HOSPITAL ENCOUNTER (INPATIENT)
Facility: HOSPITAL | Age: 60
LOS: 2 days | Discharge: HOME | DRG: 378 | End: 2020-01-01
Attending: EMERGENCY MEDICINE | Admitting: HOSPITALIST
Payer: COMMERCIAL

## 2020-01-01 ENCOUNTER — OFFICE VISIT (OUTPATIENT)
Dept: INTERNAL MEDICINE CLINIC | Facility: CLINIC | Age: 60
End: 2020-01-01
Payer: COMMERCIAL

## 2020-01-01 ENCOUNTER — APPOINTMENT (OUTPATIENT)
Dept: GENERAL RADIOLOGY | Age: 60
DRG: 602 | End: 2020-01-01
Attending: EMERGENCY MEDICINE
Payer: COMMERCIAL

## 2020-01-01 ENCOUNTER — APPOINTMENT (OUTPATIENT)
Dept: ULTRASOUND IMAGING | Facility: HOSPITAL | Age: 60
DRG: 378 | End: 2020-01-01
Attending: HOSPITALIST
Payer: COMMERCIAL

## 2020-01-01 ENCOUNTER — TELEPHONE (OUTPATIENT)
Dept: HEMATOLOGY/ONCOLOGY | Age: 60
End: 2020-01-01

## 2020-01-01 ENCOUNTER — TELEPHONE (OUTPATIENT)
Dept: PODIATRY CLINIC | Facility: CLINIC | Age: 60
End: 2020-01-01

## 2020-01-01 ENCOUNTER — HOSPITAL ENCOUNTER (INPATIENT)
Facility: HOSPITAL | Age: 60
LOS: 6 days | Discharge: HOME HEALTH | DRG: 264 | End: 2020-01-01
Attending: EMERGENCY MEDICINE | Admitting: INTERNAL MEDICINE
Payer: COMMERCIAL

## 2020-01-01 ENCOUNTER — APPOINTMENT (OUTPATIENT)
Dept: GENERAL RADIOLOGY | Facility: HOSPITAL | Age: 60
DRG: 264 | End: 2020-01-01
Attending: INTERNAL MEDICINE
Payer: COMMERCIAL

## 2020-01-01 ENCOUNTER — ANESTHESIA EVENT (OUTPATIENT)
Dept: ENDOSCOPY | Facility: HOSPITAL | Age: 60
DRG: 371 | End: 2020-01-01
Payer: COMMERCIAL

## 2020-01-01 ENCOUNTER — ANESTHESIA EVENT (OUTPATIENT)
Dept: ENDOSCOPY | Facility: HOSPITAL | Age: 60
DRG: 378 | End: 2020-01-01
Payer: COMMERCIAL

## 2020-01-01 ENCOUNTER — APPOINTMENT (OUTPATIENT)
Dept: ULTRASOUND IMAGING | Age: 60
DRG: 602 | End: 2020-01-01
Attending: EMERGENCY MEDICINE
Payer: COMMERCIAL

## 2020-01-01 ENCOUNTER — HOSPITAL ENCOUNTER (EMERGENCY)
Facility: HOSPITAL | Age: 60
Discharge: HOME | End: 2020-01-01
Attending: EMERGENCY MEDICINE
Payer: COMMERCIAL

## 2020-01-01 ENCOUNTER — HOSPITAL ENCOUNTER (OUTPATIENT)
Facility: HOSPITAL | Age: 60
Setting detail: HOSPITAL OUTPATIENT SURGERY
Discharge: HOME | End: 2020-01-01
Attending: PLASTIC SURGERY | Admitting: PLASTIC SURGERY
Payer: COMMERCIAL

## 2020-01-01 ENCOUNTER — ANESTHESIA EVENT (OUTPATIENT)
Dept: SURGERY | Facility: HOSPITAL | Age: 60
End: 2020-01-01
Payer: COMMERCIAL

## 2020-01-01 ENCOUNTER — HOSPITAL ENCOUNTER (INPATIENT)
Facility: HOSPITAL | Age: 60
LOS: 3 days | Discharge: HOME | DRG: 393 | End: 2020-01-01
Attending: EMERGENCY MEDICINE | Admitting: HOSPITALIST
Payer: COMMERCIAL

## 2020-01-01 ENCOUNTER — HOSPITAL ENCOUNTER (EMERGENCY)
Age: 60
Discharge: HOME | End: 2020-01-01
Payer: COMMERCIAL

## 2020-01-01 ENCOUNTER — ANESTHESIA EVENT (OUTPATIENT)
Dept: SURGERY | Facility: HOSPITAL | Age: 60
DRG: 264 | End: 2020-01-01
Payer: COMMERCIAL

## 2020-01-01 ENCOUNTER — ANESTHESIA (OUTPATIENT)
Dept: SURGERY | Facility: HOSPITAL | Age: 60
End: 2020-01-01
Payer: COMMERCIAL

## 2020-01-01 ENCOUNTER — ANESTHESIA EVENT (OUTPATIENT)
Dept: ENDOSCOPY | Facility: HOSPITAL | Age: 60
End: 2020-01-01
Payer: COMMERCIAL

## 2020-01-01 ENCOUNTER — OFFICE VISIT (OUTPATIENT)
Dept: SURGERY | Facility: CLINIC | Age: 60
End: 2020-01-01

## 2020-01-01 ENCOUNTER — APPOINTMENT (OUTPATIENT)
Dept: CT IMAGING | Facility: HOSPITAL | Age: 60
DRG: 378 | End: 2020-01-01
Attending: EMERGENCY MEDICINE
Payer: COMMERCIAL

## 2020-01-01 VITALS
TEMPERATURE: 98 F | WEIGHT: 221.44 LBS | RESPIRATION RATE: 18 BRPM | SYSTOLIC BLOOD PRESSURE: 129 MMHG | HEIGHT: 72 IN | OXYGEN SATURATION: 95 % | HEART RATE: 60 BPM | DIASTOLIC BLOOD PRESSURE: 80 MMHG | BODY MASS INDEX: 29.99 KG/M2

## 2020-01-01 VITALS
HEIGHT: 72.05 IN | RESPIRATION RATE: 20 BRPM | HEART RATE: 63 BPM | BODY MASS INDEX: 30.77 KG/M2 | TEMPERATURE: 98 F | OXYGEN SATURATION: 97 % | SYSTOLIC BLOOD PRESSURE: 115 MMHG | DIASTOLIC BLOOD PRESSURE: 77 MMHG | WEIGHT: 227.19 LBS

## 2020-01-01 VITALS
HEART RATE: 83 BPM | OXYGEN SATURATION: 96 % | TEMPERATURE: 98 F | BODY MASS INDEX: 32.48 KG/M2 | WEIGHT: 232 LBS | SYSTOLIC BLOOD PRESSURE: 132 MMHG | HEIGHT: 71 IN | DIASTOLIC BLOOD PRESSURE: 76 MMHG | RESPIRATION RATE: 18 BRPM

## 2020-01-01 VITALS
HEIGHT: 71 IN | BODY MASS INDEX: 31.5 KG/M2 | DIASTOLIC BLOOD PRESSURE: 62 MMHG | SYSTOLIC BLOOD PRESSURE: 96 MMHG | OXYGEN SATURATION: 99 % | WEIGHT: 225 LBS | HEART RATE: 88 BPM | RESPIRATION RATE: 22 BRPM | TEMPERATURE: 97 F

## 2020-01-01 VITALS
BODY MASS INDEX: 28.39 KG/M2 | OXYGEN SATURATION: 99 % | HEART RATE: 67 BPM | DIASTOLIC BLOOD PRESSURE: 71 MMHG | SYSTOLIC BLOOD PRESSURE: 111 MMHG | HEIGHT: 72.01 IN | TEMPERATURE: 98 F | RESPIRATION RATE: 16 BRPM | WEIGHT: 209.63 LBS

## 2020-01-01 VITALS
TEMPERATURE: 96 F | HEART RATE: 60 BPM | RESPIRATION RATE: 20 BRPM | SYSTOLIC BLOOD PRESSURE: 104 MMHG | DIASTOLIC BLOOD PRESSURE: 68 MMHG | HEIGHT: 72.05 IN | OXYGEN SATURATION: 98 % | BODY MASS INDEX: 33 KG/M2

## 2020-01-01 VITALS
WEIGHT: 217.5 LBS | RESPIRATION RATE: 18 BRPM | OXYGEN SATURATION: 98 % | DIASTOLIC BLOOD PRESSURE: 61 MMHG | HEART RATE: 59 BPM | TEMPERATURE: 98 F | BODY MASS INDEX: 30.45 KG/M2 | HEIGHT: 71 IN | SYSTOLIC BLOOD PRESSURE: 113 MMHG

## 2020-01-01 VITALS
DIASTOLIC BLOOD PRESSURE: 62 MMHG | TEMPERATURE: 96 F | SYSTOLIC BLOOD PRESSURE: 100 MMHG | OXYGEN SATURATION: 98 % | BODY MASS INDEX: 31 KG/M2 | HEIGHT: 72.05 IN | RESPIRATION RATE: 20 BRPM | HEART RATE: 83 BPM

## 2020-01-01 VITALS
HEIGHT: 72.05 IN | BODY MASS INDEX: 30.72 KG/M2 | TEMPERATURE: 97 F | HEART RATE: 73 BPM | SYSTOLIC BLOOD PRESSURE: 116 MMHG | RESPIRATION RATE: 20 BRPM | OXYGEN SATURATION: 96 % | DIASTOLIC BLOOD PRESSURE: 72 MMHG | WEIGHT: 226.81 LBS

## 2020-01-01 VITALS
SYSTOLIC BLOOD PRESSURE: 133 MMHG | SYSTOLIC BLOOD PRESSURE: 127 MMHG | HEART RATE: 61 BPM | HEIGHT: 72.05 IN | WEIGHT: 240 LBS | WEIGHT: 238 LBS | HEART RATE: 98 BPM | DIASTOLIC BLOOD PRESSURE: 80 MMHG | RESPIRATION RATE: 20 BRPM | TEMPERATURE: 98 F | BODY MASS INDEX: 32.23 KG/M2 | TEMPERATURE: 98 F | OXYGEN SATURATION: 99 % | HEIGHT: 72.05 IN | DIASTOLIC BLOOD PRESSURE: 62 MMHG | RESPIRATION RATE: 16 BRPM | OXYGEN SATURATION: 95 % | BODY MASS INDEX: 32.51 KG/M2

## 2020-01-01 VITALS
SYSTOLIC BLOOD PRESSURE: 104 MMHG | OXYGEN SATURATION: 99 % | TEMPERATURE: 99 F | DIASTOLIC BLOOD PRESSURE: 72 MMHG | BODY MASS INDEX: 32.86 KG/M2 | WEIGHT: 242.63 LBS | RESPIRATION RATE: 20 BRPM | HEIGHT: 72.05 IN | HEART RATE: 73 BPM

## 2020-01-01 VITALS
BODY MASS INDEX: 32.8 KG/M2 | RESPIRATION RATE: 16 BRPM | TEMPERATURE: 98 F | HEIGHT: 72.05 IN | WEIGHT: 242.19 LBS | DIASTOLIC BLOOD PRESSURE: 78 MMHG | SYSTOLIC BLOOD PRESSURE: 133 MMHG | OXYGEN SATURATION: 97 % | HEART RATE: 74 BPM

## 2020-01-01 VITALS
WEIGHT: 236 LBS | OXYGEN SATURATION: 98 % | HEART RATE: 58 BPM | DIASTOLIC BLOOD PRESSURE: 79 MMHG | HEIGHT: 71 IN | RESPIRATION RATE: 18 BRPM | BODY MASS INDEX: 33.04 KG/M2 | SYSTOLIC BLOOD PRESSURE: 132 MMHG | TEMPERATURE: 99 F

## 2020-01-01 VITALS
HEIGHT: 72.05 IN | DIASTOLIC BLOOD PRESSURE: 69 MMHG | TEMPERATURE: 97 F | HEART RATE: 60 BPM | OXYGEN SATURATION: 98 % | RESPIRATION RATE: 20 BRPM | SYSTOLIC BLOOD PRESSURE: 108 MMHG | WEIGHT: 236.81 LBS | BODY MASS INDEX: 32.08 KG/M2

## 2020-01-01 VITALS
OXYGEN SATURATION: 99 % | RESPIRATION RATE: 20 BRPM | HEIGHT: 71 IN | SYSTOLIC BLOOD PRESSURE: 104 MMHG | WEIGHT: 226 LBS | BODY MASS INDEX: 31.64 KG/M2 | HEART RATE: 60 BPM | DIASTOLIC BLOOD PRESSURE: 64 MMHG | TEMPERATURE: 97 F

## 2020-01-01 VITALS
HEART RATE: 62 BPM | TEMPERATURE: 98 F | SYSTOLIC BLOOD PRESSURE: 127 MMHG | WEIGHT: 232.63 LBS | BODY MASS INDEX: 31.51 KG/M2 | OXYGEN SATURATION: 99 % | DIASTOLIC BLOOD PRESSURE: 73 MMHG | RESPIRATION RATE: 16 BRPM | HEIGHT: 72.05 IN

## 2020-01-01 VITALS
DIASTOLIC BLOOD PRESSURE: 88 MMHG | TEMPERATURE: 98 F | HEART RATE: 65 BPM | WEIGHT: 240 LBS | RESPIRATION RATE: 14 BRPM | OXYGEN SATURATION: 96 % | SYSTOLIC BLOOD PRESSURE: 163 MMHG | BODY MASS INDEX: 33 KG/M2

## 2020-01-01 VITALS
DIASTOLIC BLOOD PRESSURE: 60 MMHG | HEIGHT: 71 IN | TEMPERATURE: 98 F | SYSTOLIC BLOOD PRESSURE: 104 MMHG | OXYGEN SATURATION: 98 % | RESPIRATION RATE: 18 BRPM | HEART RATE: 70 BPM | BODY MASS INDEX: 32.48 KG/M2 | WEIGHT: 232 LBS

## 2020-01-01 VITALS
DIASTOLIC BLOOD PRESSURE: 65 MMHG | SYSTOLIC BLOOD PRESSURE: 99 MMHG | HEART RATE: 73 BPM | RESPIRATION RATE: 20 BRPM | OXYGEN SATURATION: 98 % | TEMPERATURE: 98 F

## 2020-01-01 VITALS
HEART RATE: 86 BPM | TEMPERATURE: 98 F | BODY MASS INDEX: 33.04 KG/M2 | WEIGHT: 236 LBS | RESPIRATION RATE: 18 BRPM | OXYGEN SATURATION: 97 % | DIASTOLIC BLOOD PRESSURE: 74 MMHG | SYSTOLIC BLOOD PRESSURE: 121 MMHG | HEIGHT: 71 IN

## 2020-01-01 DIAGNOSIS — G47.09 OTHER INSOMNIA: ICD-10-CM

## 2020-01-01 DIAGNOSIS — D61.818 PANCYTOPENIA (HCC): ICD-10-CM

## 2020-01-01 DIAGNOSIS — F10.11 ALCOHOL ABUSE, IN REMISSION: ICD-10-CM

## 2020-01-01 DIAGNOSIS — K74.60 CHRONIC HEPATITIS C WITH CIRRHOSIS (HCC): ICD-10-CM

## 2020-01-01 DIAGNOSIS — R11.10 VOMITING: ICD-10-CM

## 2020-01-01 DIAGNOSIS — Z48.02: Primary | ICD-10-CM

## 2020-01-01 DIAGNOSIS — D68.9 COAGULOPATHY (HCC): ICD-10-CM

## 2020-01-01 DIAGNOSIS — F17.210 CIGARETTE SMOKER: ICD-10-CM

## 2020-01-01 DIAGNOSIS — D64.9 ANEMIA, UNSPECIFIED TYPE: ICD-10-CM

## 2020-01-01 DIAGNOSIS — D64.9 NORMOCYTIC ANEMIA: ICD-10-CM

## 2020-01-01 DIAGNOSIS — R10.11 RUQ ABDOMINAL PAIN: ICD-10-CM

## 2020-01-01 DIAGNOSIS — L08.9 FOOT INFECTION: ICD-10-CM

## 2020-01-01 DIAGNOSIS — C22.0 HEPATOCELLULAR CARCINOMA (HCC): ICD-10-CM

## 2020-01-01 DIAGNOSIS — B18.2 CHRONIC HEPATITIS C WITH CIRRHOSIS (HCC): ICD-10-CM

## 2020-01-01 DIAGNOSIS — M79.89 NECROTIZING SOFT TISSUE INFECTION: ICD-10-CM

## 2020-01-01 DIAGNOSIS — C34.91 PRIMARY LUNG SQUAMOUS CELL CARCINOMA, RIGHT (HCC): ICD-10-CM

## 2020-01-01 DIAGNOSIS — R07.81 RIB PAIN ON LEFT SIDE: ICD-10-CM

## 2020-01-01 DIAGNOSIS — K52.1: ICD-10-CM

## 2020-01-01 DIAGNOSIS — R19.7 DIARRHEA, UNSPECIFIED TYPE: ICD-10-CM

## 2020-01-01 DIAGNOSIS — R53.0 NEOPLASTIC MALIGNANT RELATED FATIGUE: Primary | ICD-10-CM

## 2020-01-01 DIAGNOSIS — D69.6 THROMBOCYTOPENIA (HCC): ICD-10-CM

## 2020-01-01 DIAGNOSIS — K20.80 ESOPHAGITIS DUE TO DRUG: ICD-10-CM

## 2020-01-01 DIAGNOSIS — D63.1 ANEMIA DUE TO CHRONIC KIDNEY DISEASE, UNSPECIFIED CKD STAGE: Primary | ICD-10-CM

## 2020-01-01 DIAGNOSIS — K92.1 MELANOTIC STOOLS: ICD-10-CM

## 2020-01-01 DIAGNOSIS — Z02.9 ENCOUNTERS FOR UNSPECIFIED ADMINISTRATIVE PURPOSE: ICD-10-CM

## 2020-01-01 DIAGNOSIS — K70.30 ALCOHOLIC CIRRHOSIS OF LIVER WITHOUT ASCITES (HCC): ICD-10-CM

## 2020-01-01 DIAGNOSIS — M53.3 COCCYDYNIA: ICD-10-CM

## 2020-01-01 DIAGNOSIS — Z98.890 STATUS POST FOOT SURGERY: Primary | ICD-10-CM

## 2020-01-01 DIAGNOSIS — S91.302A OPEN WOUND OF LEFT FOOT: ICD-10-CM

## 2020-01-01 DIAGNOSIS — L03.116 CELLULITIS OF LEFT LOWER EXTREMITY: ICD-10-CM

## 2020-01-01 DIAGNOSIS — Z48.00 ATTENTION TO DRESSINGS AND SUTURES: Primary | ICD-10-CM

## 2020-01-01 DIAGNOSIS — M79.672 LEFT FOOT PAIN: ICD-10-CM

## 2020-01-01 DIAGNOSIS — K20.80 ESOPHAGITIS DUE TO DRUG: Primary | ICD-10-CM

## 2020-01-01 DIAGNOSIS — R19.7 DIARRHEA: ICD-10-CM

## 2020-01-01 DIAGNOSIS — T36.4X5A ESOPHAGITIS DUE TO DRUG: Primary | ICD-10-CM

## 2020-01-01 DIAGNOSIS — L03.116 CELLULITIS OF LEFT LOWER EXTREMITY: Primary | ICD-10-CM

## 2020-01-01 DIAGNOSIS — I10 ESSENTIAL HYPERTENSION: ICD-10-CM

## 2020-01-01 DIAGNOSIS — R13.10 ODYNOPHAGIA: ICD-10-CM

## 2020-01-01 DIAGNOSIS — R79.89 ELEVATED LIVER FUNCTION TESTS: ICD-10-CM

## 2020-01-01 DIAGNOSIS — K52.9 ENTEROCOLITIS: ICD-10-CM

## 2020-01-01 DIAGNOSIS — D63.1 ANEMIA DUE TO CHRONIC KIDNEY DISEASE, UNSPECIFIED CKD STAGE: ICD-10-CM

## 2020-01-01 DIAGNOSIS — C22.0 HEPATOCELLULAR CARCINOMA (HCC): Primary | ICD-10-CM

## 2020-01-01 DIAGNOSIS — S91.302A OPEN WOUND OF LEFT FOOT, INITIAL ENCOUNTER: Primary | ICD-10-CM

## 2020-01-01 DIAGNOSIS — E87.6 HYPOKALEMIA: ICD-10-CM

## 2020-01-01 DIAGNOSIS — R60.0 BILATERAL LOWER EXTREMITY EDEMA: ICD-10-CM

## 2020-01-01 DIAGNOSIS — Z48.02 ATTENTION TO DRESSINGS AND SUTURES: Primary | ICD-10-CM

## 2020-01-01 DIAGNOSIS — C34.91 NON-SMALL CELL CANCER OF RIGHT LUNG (HCC): ICD-10-CM

## 2020-01-01 DIAGNOSIS — R10.84 GENERALIZED ABDOMINAL PAIN: ICD-10-CM

## 2020-01-01 DIAGNOSIS — S91.302D OPEN WOUND OF FOOT, LEFT, SUBSEQUENT ENCOUNTER: ICD-10-CM

## 2020-01-01 DIAGNOSIS — K70.31 ASCITES DUE TO ALCOHOLIC CIRRHOSIS (HCC): ICD-10-CM

## 2020-01-01 DIAGNOSIS — K92.1 MELENA: ICD-10-CM

## 2020-01-01 DIAGNOSIS — K85.90 ACUTE ON CHRONIC PANCREATITIS (HCC): ICD-10-CM

## 2020-01-01 DIAGNOSIS — T36.4X5A ESOPHAGITIS DUE TO DRUG: ICD-10-CM

## 2020-01-01 DIAGNOSIS — R10.9 ABDOMINAL PAIN, ACUTE: Primary | ICD-10-CM

## 2020-01-01 DIAGNOSIS — K92.2 LOWER GI BLEEDING: ICD-10-CM

## 2020-01-01 DIAGNOSIS — S91.302A OPEN WOUND OF LEFT FOOT, INITIAL ENCOUNTER: ICD-10-CM

## 2020-01-01 DIAGNOSIS — C34.90 NON-SMALL CELL LUNG CANCER, UNSPECIFIED LATERALITY (HCC): ICD-10-CM

## 2020-01-01 DIAGNOSIS — K86.1 ACUTE ON CHRONIC PANCREATITIS (HCC): ICD-10-CM

## 2020-01-01 DIAGNOSIS — K52.9 ENTERITIS: ICD-10-CM

## 2020-01-01 DIAGNOSIS — L08.9 FOOT INFECTION: Primary | ICD-10-CM

## 2020-01-01 DIAGNOSIS — T14.8XXA OPEN WOUND: Primary | ICD-10-CM

## 2020-01-01 DIAGNOSIS — K52.9 COLITIS: ICD-10-CM

## 2020-01-01 DIAGNOSIS — K70.31 ALCOHOLIC CIRRHOSIS OF LIVER WITH ASCITES (HCC): ICD-10-CM

## 2020-01-01 DIAGNOSIS — R93.3 ABNORMAL CT SCAN, SMALL BOWEL: ICD-10-CM

## 2020-01-01 DIAGNOSIS — R11.0 NAUSEA: ICD-10-CM

## 2020-01-01 DIAGNOSIS — R91.8 GROUND GLASS OPACITY PRESENT ON IMAGING OF LUNG: ICD-10-CM

## 2020-01-01 DIAGNOSIS — N18.9 ANEMIA DUE TO CHRONIC KIDNEY DISEASE, UNSPECIFIED CKD STAGE: Primary | ICD-10-CM

## 2020-01-01 DIAGNOSIS — R79.89 AZOTEMIA: ICD-10-CM

## 2020-01-01 DIAGNOSIS — IMO0001 ALCOHOLISM /ALCOHOL ABUSE: ICD-10-CM

## 2020-01-01 DIAGNOSIS — S91.302A UNSPECIFIED OPEN WOUND, LEFT FOOT, INITIAL ENCOUNTER: Primary | ICD-10-CM

## 2020-01-01 DIAGNOSIS — N18.9 ANEMIA DUE TO CHRONIC KIDNEY DISEASE, UNSPECIFIED CKD STAGE: ICD-10-CM

## 2020-01-01 DIAGNOSIS — A04.72 CLOSTRIDIUM DIFFICILE COLITIS: Primary | ICD-10-CM

## 2020-01-01 LAB
ADENOVIRUS F 40/41 PCR: NEGATIVE
AFP-TM SERPL-MCNC: 18.5 NG/ML (ref ?–8)
AFP-TM SERPL-MCNC: 20.2 NG/ML (ref ?–8)
AFP-TM SERPL-MCNC: 24.8 NG/ML (ref ?–8)
AFP-TM SERPL-MCNC: 27.9 NG/ML (ref ?–8)
AFP-TM SERPL-MCNC: 36.3 NG/ML (ref ?–8)
ALBUMIN SERPL-MCNC: 2.3 G/DL (ref 3.4–5)
ALBUMIN SERPL-MCNC: 2.4 G/DL (ref 3.4–5)
ALBUMIN SERPL-MCNC: 2.6 G/DL (ref 3.4–5)
ALBUMIN SERPL-MCNC: 2.8 G/DL (ref 3.4–5)
ALBUMIN SERPL-MCNC: 2.9 G/DL (ref 3.4–5)
ALBUMIN SERPL-MCNC: 2.9 G/DL (ref 3.4–5)
ALBUMIN SERPL-MCNC: 3 G/DL (ref 3.4–5)
ALBUMIN/GLOB SERPL: 0.7 {RATIO} (ref 1–2)
ALBUMIN/GLOB SERPL: 0.8 {RATIO} (ref 1–2)
ALBUMIN/GLOB SERPL: 0.9 {RATIO} (ref 1–2)
ALP LIVER SERPL-CCNC: 130 U/L (ref 45–117)
ALP LIVER SERPL-CCNC: 132 U/L (ref 45–117)
ALP LIVER SERPL-CCNC: 132 U/L (ref 45–117)
ALP LIVER SERPL-CCNC: 148 U/L (ref 45–117)
ALP LIVER SERPL-CCNC: 150 U/L (ref 45–117)
ALP LIVER SERPL-CCNC: 175 U/L
ALP LIVER SERPL-CCNC: 192 U/L (ref 45–117)
ALP LIVER SERPL-CCNC: 80 U/L (ref 45–117)
ALP LIVER SERPL-CCNC: 84 U/L (ref 45–117)
ALP LIVER SERPL-CCNC: 93 U/L (ref 45–117)
ALT SERPL-CCNC: 28 U/L (ref 16–61)
ALT SERPL-CCNC: 33 U/L (ref 16–61)
ALT SERPL-CCNC: 35 U/L (ref 16–61)
ALT SERPL-CCNC: 35 U/L (ref 16–61)
ALT SERPL-CCNC: 36 U/L (ref 16–61)
ALT SERPL-CCNC: 40 U/L (ref 16–61)
ALT SERPL-CCNC: 40 U/L (ref 16–61)
ALT SERPL-CCNC: 41 U/L (ref 16–61)
ALT SERPL-CCNC: 49 U/L
ALT SERPL-CCNC: 63 U/L (ref 16–61)
AMMONIA PLAS-MCNC: 40 UMOL/L (ref 11–32)
AMMONIA PLAS-MCNC: 98 UMOL/L (ref 11–32)
ANION GAP SERPL CALC-SCNC: 2 MMOL/L (ref 0–18)
ANION GAP SERPL CALC-SCNC: 3 MMOL/L (ref 0–18)
ANION GAP SERPL CALC-SCNC: 4 MMOL/L (ref 0–18)
ANION GAP SERPL CALC-SCNC: 5 MMOL/L (ref 0–18)
ANION GAP SERPL CALC-SCNC: 6 MMOL/L (ref 0–18)
ANION GAP SERPL CALC-SCNC: 7 MMOL/L (ref 0–18)
ANION GAP SERPL CALC-SCNC: 8 MMOL/L (ref 0–18)
ANTIBODY SCREEN: NEGATIVE
ANTIBODY SCREEN: NEGATIVE
APTT PPP: 32 SECONDS (ref 25.4–36.1)
APTT PPP: 34.4 SECONDS (ref 25.4–36.1)
AST SERPL-CCNC: 26 U/L (ref 15–37)
AST SERPL-CCNC: 34 U/L (ref 15–37)
AST SERPL-CCNC: 37 U/L (ref 15–37)
AST SERPL-CCNC: 43 U/L (ref 15–37)
AST SERPL-CCNC: 44 U/L (ref 15–37)
AST SERPL-CCNC: 45 U/L (ref 15–37)
AST SERPL-CCNC: 46 U/L (ref 15–37)
AST SERPL-CCNC: 49 U/L (ref 15–37)
AST SERPL-CCNC: 51 U/L (ref 15–37)
AST SERPL-CCNC: 57 U/L (ref 15–37)
ASTROVIRUS PCR: NEGATIVE
BASOPHILS # BLD AUTO: 0 X10(3) UL (ref 0–0.2)
BASOPHILS # BLD AUTO: 0 X10(3) UL (ref 0–0.2)
BASOPHILS # BLD AUTO: 0.01 X10(3) UL (ref 0–0.2)
BASOPHILS # BLD AUTO: 0.02 X10(3) UL (ref 0–0.2)
BASOPHILS NFR BLD AUTO: 0 %
BASOPHILS NFR BLD AUTO: 0 %
BASOPHILS NFR BLD AUTO: 0.2 %
BASOPHILS NFR BLD AUTO: 0.3 %
BASOPHILS NFR BLD AUTO: 0.3 %
BASOPHILS NFR BLD AUTO: 0.4 %
BASOPHILS NFR BLD AUTO: 0.5 %
BASOPHILS NFR BLD AUTO: 0.6 %
BILIRUB SERPL-MCNC: 1.7 MG/DL (ref 0.1–2)
BILIRUB SERPL-MCNC: 1.9 MG/DL (ref 0.1–2)
BILIRUB SERPL-MCNC: 2.2 MG/DL (ref 0.1–2)
BILIRUB SERPL-MCNC: 2.2 MG/DL (ref 0.1–2)
BILIRUB SERPL-MCNC: 2.5 MG/DL (ref 0.1–2)
BILIRUB SERPL-MCNC: 2.6 MG/DL (ref 0.1–2)
BILIRUB SERPL-MCNC: 2.8 MG/DL (ref 0.1–2)
BILIRUB SERPL-MCNC: 3.1 MG/DL (ref 0.1–2)
BILIRUB SERPL-MCNC: 3.2 MG/DL (ref 0.1–2)
BILIRUB SERPL-MCNC: 4.5 MG/DL (ref 0.1–2)
BUN BLD-MCNC: 10 MG/DL (ref 7–18)
BUN BLD-MCNC: 11 MG/DL (ref 7–18)
BUN BLD-MCNC: 12 MG/DL (ref 7–18)
BUN BLD-MCNC: 12 MG/DL (ref 7–18)
BUN BLD-MCNC: 15 MG/DL (ref 7–18)
BUN BLD-MCNC: 16 MG/DL (ref 7–18)
BUN BLD-MCNC: 16 MG/DL (ref 7–18)
BUN BLD-MCNC: 17 MG/DL (ref 7–18)
BUN BLD-MCNC: 18 MG/DL (ref 7–18)
BUN BLD-MCNC: 19 MG/DL (ref 7–18)
BUN BLD-MCNC: 20 MG/DL (ref 7–18)
BUN/CREAT SERPL: 13.7 (ref 10–20)
BUN/CREAT SERPL: 14.1 (ref 10–20)
BUN/CREAT SERPL: 16.2 (ref 10–20)
BUN/CREAT SERPL: 16.7 (ref 10–20)
BUN/CREAT SERPL: 17 (ref 10–20)
BUN/CREAT SERPL: 19.8 (ref 10–20)
BUN/CREAT SERPL: 21.6 (ref 10–20)
BUN/CREAT SERPL: 23.8 (ref 10–20)
BUN/CREAT SERPL: 25.8 (ref 10–20)
BUN/CREAT SERPL: 25.8 (ref 10–20)
BUN/CREAT SERPL: 26.8 (ref 10–20)
C CAYETANENSIS DNA SPEC QL NAA+PROBE: NEGATIVE
C DIFF TOX B STL QL: NEGATIVE
CALCIUM BLD-MCNC: 7.5 MG/DL (ref 8.5–10.1)
CALCIUM BLD-MCNC: 7.6 MG/DL (ref 8.5–10.1)
CALCIUM BLD-MCNC: 7.9 MG/DL (ref 8.5–10.1)
CALCIUM BLD-MCNC: 7.9 MG/DL (ref 8.5–10.1)
CALCIUM BLD-MCNC: 8 MG/DL (ref 8.5–10.1)
CALCIUM BLD-MCNC: 8.1 MG/DL (ref 8.5–10.1)
CALCIUM BLD-MCNC: 8.2 MG/DL (ref 8.5–10.1)
CALCIUM BLD-MCNC: 8.5 MG/DL (ref 8.5–10.1)
CALCIUM BLD-MCNC: 8.5 MG/DL (ref 8.5–10.1)
CALCIUM BLD-MCNC: 8.6 MG/DL (ref 8.5–10.1)
CALCIUM BLD-MCNC: 8.9 MG/DL (ref 8.5–10.1)
CAMPY SP DNA.DIARRHEA STL QL NAA+PROBE: NEGATIVE
CHLORIDE SERPL-SCNC: 101 MMOL/L (ref 98–112)
CHLORIDE SERPL-SCNC: 102 MMOL/L (ref 98–112)
CHLORIDE SERPL-SCNC: 104 MMOL/L (ref 98–112)
CHLORIDE SERPL-SCNC: 105 MMOL/L (ref 98–112)
CHLORIDE SERPL-SCNC: 107 MMOL/L (ref 98–112)
CHLORIDE SERPL-SCNC: 107 MMOL/L (ref 98–112)
CHLORIDE SERPL-SCNC: 113 MMOL/L (ref 98–112)
CHLORIDE SERPL-SCNC: 113 MMOL/L (ref 98–112)
CHLORIDE SERPL-SCNC: 114 MMOL/L (ref 98–112)
CO2 SERPL-SCNC: 16 MMOL/L (ref 21–32)
CO2 SERPL-SCNC: 18 MMOL/L (ref 21–32)
CO2 SERPL-SCNC: 19 MMOL/L (ref 21–32)
CO2 SERPL-SCNC: 22 MMOL/L (ref 21–32)
CO2 SERPL-SCNC: 23 MMOL/L (ref 21–32)
CO2 SERPL-SCNC: 23 MMOL/L (ref 21–32)
CO2 SERPL-SCNC: 24 MMOL/L (ref 21–32)
CO2 SERPL-SCNC: 24 MMOL/L (ref 21–32)
CO2 SERPL-SCNC: 26 MMOL/L (ref 21–32)
CO2 SERPL-SCNC: 26 MMOL/L (ref 21–32)
CO2 SERPL-SCNC: 27 MMOL/L (ref 21–32)
CREAT BLD-MCNC: 0.56 MG/DL
CREAT BLD-MCNC: 0.62 MG/DL (ref 0.7–1.3)
CREAT BLD-MCNC: 0.66 MG/DL (ref 0.7–1.3)
CREAT BLD-MCNC: 0.72 MG/DL (ref 0.7–1.3)
CREAT BLD-MCNC: 0.73 MG/DL (ref 0.7–1.3)
CREAT BLD-MCNC: 0.74 MG/DL (ref 0.7–1.3)
CREAT BLD-MCNC: 0.78 MG/DL (ref 0.7–1.3)
CREAT BLD-MCNC: 0.81 MG/DL (ref 0.7–1.3)
CREAT BLD-MCNC: 0.84 MG/DL (ref 0.7–1.3)
CREAT BLD-MCNC: 0.88 MG/DL (ref 0.7–1.3)
CREAT BLD-MCNC: 1.06 MG/DL (ref 0.7–1.3)
CRP SERPL-MCNC: 0.7 MG/DL (ref ?–0.3)
CRYPTOSP DNA SPEC QL NAA+PROBE: NEGATIVE
DEPRECATED HBV CORE AB SER IA-ACNC: 214.1 NG/ML (ref 30–530)
DEPRECATED RDW RBC AUTO: 46.1 FL (ref 35.1–46.3)
DEPRECATED RDW RBC AUTO: 46.5 FL (ref 35.1–46.3)
DEPRECATED RDW RBC AUTO: 49.6 FL (ref 35.1–46.3)
DEPRECATED RDW RBC AUTO: 49.9 FL (ref 35.1–46.3)
DEPRECATED RDW RBC AUTO: 50.3 FL (ref 35.1–46.3)
DEPRECATED RDW RBC AUTO: 50.8 FL (ref 35.1–46.3)
DEPRECATED RDW RBC AUTO: 56.2 FL (ref 35.1–46.3)
DEPRECATED RDW RBC AUTO: 56.9 FL (ref 35.1–46.3)
DEPRECATED RDW RBC AUTO: 59 FL (ref 35.1–46.3)
DEPRECATED RDW RBC AUTO: 61.5 FL (ref 35.1–46.3)
DEPRECATED RDW RBC AUTO: 61.8 FL (ref 35.1–46.3)
EAEC PAA PLAS AGGR+AATA ST NAA+NON-PRB: NEGATIVE
EC STX1+STX2 + H7 FLIC SPEC NAA+PROBE: NEGATIVE
ENTAMOEBA HISTOLYTICA PCR: NEGATIVE
EOSINOPHIL # BLD AUTO: 0 X10(3) UL (ref 0–0.7)
EOSINOPHIL # BLD AUTO: 0 X10(3) UL (ref 0–0.7)
EOSINOPHIL # BLD AUTO: 0.08 X10(3) UL (ref 0–0.7)
EOSINOPHIL # BLD AUTO: 0.09 X10(3) UL (ref 0–0.7)
EOSINOPHIL # BLD AUTO: 0.09 X10(3) UL (ref 0–0.7)
EOSINOPHIL # BLD AUTO: 0.11 X10(3) UL (ref 0–0.7)
EOSINOPHIL # BLD AUTO: 0.11 X10(3) UL (ref 0–0.7)
EOSINOPHIL # BLD AUTO: 0.12 X10(3) UL (ref 0–0.7)
EOSINOPHIL # BLD AUTO: 0.13 X10(3) UL (ref 0–0.7)
EOSINOPHIL # BLD AUTO: 0.14 X10(3) UL (ref 0–0.7)
EOSINOPHIL # BLD AUTO: 0.14 X10(3) UL (ref 0–0.7)
EOSINOPHIL NFR BLD AUTO: 0 %
EOSINOPHIL NFR BLD AUTO: 0 %
EOSINOPHIL NFR BLD AUTO: 1.3 %
EOSINOPHIL NFR BLD AUTO: 2 %
EOSINOPHIL NFR BLD AUTO: 2.3 %
EOSINOPHIL NFR BLD AUTO: 2.4 %
EOSINOPHIL NFR BLD AUTO: 3.5 %
EOSINOPHIL NFR BLD AUTO: 4.9 %
EOSINOPHIL NFR BLD AUTO: 5.4 %
EPEC EAE GENE STL QL NAA+NON-PROBE: NEGATIVE
ERYTHROCYTE [DISTWIDTH] IN BLOOD BY AUTOMATED COUNT: 13 % (ref 11–15)
ERYTHROCYTE [DISTWIDTH] IN BLOOD BY AUTOMATED COUNT: 13.2 % (ref 11–15)
ERYTHROCYTE [DISTWIDTH] IN BLOOD BY AUTOMATED COUNT: 13.4 % (ref 11–15)
ERYTHROCYTE [DISTWIDTH] IN BLOOD BY AUTOMATED COUNT: 13.6 % (ref 11–15)
ERYTHROCYTE [DISTWIDTH] IN BLOOD BY AUTOMATED COUNT: 13.8 % (ref 11–15)
ERYTHROCYTE [DISTWIDTH] IN BLOOD BY AUTOMATED COUNT: 14 % (ref 11–15)
ERYTHROCYTE [DISTWIDTH] IN BLOOD BY AUTOMATED COUNT: 15.7 % (ref 11–15)
ERYTHROCYTE [DISTWIDTH] IN BLOOD BY AUTOMATED COUNT: 15.9 % (ref 11–15)
ERYTHROCYTE [DISTWIDTH] IN BLOOD BY AUTOMATED COUNT: 16.2 % (ref 11–15)
ERYTHROCYTE [DISTWIDTH] IN BLOOD BY AUTOMATED COUNT: 16.3 % (ref 11–15)
ERYTHROCYTE [DISTWIDTH] IN BLOOD BY AUTOMATED COUNT: 16.3 % (ref 11–15)
ETEC LTA+ST1A+ST1B TOX ST NAA+NON-PROBE: NEGATIVE
GIARDIA LAMBLIA PCR: NEGATIVE
GLOBULIN PLAS-MCNC: 2.6 G/DL (ref 2.8–4.4)
GLOBULIN PLAS-MCNC: 3.1 G/DL (ref 2.8–4.4)
GLOBULIN PLAS-MCNC: 3.1 G/DL (ref 2.8–4.4)
GLOBULIN PLAS-MCNC: 3.4 G/DL (ref 2.8–4.4)
GLOBULIN PLAS-MCNC: 3.5 G/DL (ref 2.8–4.4)
GLOBULIN PLAS-MCNC: 3.7 G/DL (ref 2.8–4.4)
GLOBULIN PLAS-MCNC: 4 G/DL (ref 2.8–4.4)
GLOBULIN PLAS-MCNC: 4.2 G/DL (ref 2.8–4.4)
GLOBULIN PLAS-MCNC: 4.3 G/DL (ref 2.8–4.4)
GLOBULIN PLAS-MCNC: 4.3 G/DL (ref 2.8–4.4)
GLUCOSE BLD-MCNC: 148 MG/DL (ref 70–99)
GLUCOSE BLD-MCNC: 152 MG/DL (ref 70–99)
GLUCOSE BLD-MCNC: 161 MG/DL (ref 70–99)
GLUCOSE BLD-MCNC: 168 MG/DL (ref 70–99)
GLUCOSE BLD-MCNC: 170 MG/DL (ref 70–99)
GLUCOSE BLD-MCNC: 173 MG/DL (ref 70–99)
GLUCOSE BLD-MCNC: 181 MG/DL (ref 70–99)
GLUCOSE BLD-MCNC: 202 MG/DL (ref 70–99)
GLUCOSE BLD-MCNC: 204 MG/DL (ref 70–99)
GLUCOSE BLD-MCNC: 258 MG/DL (ref 70–99)
GLUCOSE BLD-MCNC: 97 MG/DL (ref 70–99)
HAV IGM SER QL: 1.5 MG/DL (ref 1.6–2.6)
HAV IGM SER QL: 1.9 MG/DL (ref 1.6–2.6)
HCT VFR BLD AUTO: 32.3 %
HCT VFR BLD AUTO: 33.5 % (ref 39–53)
HCT VFR BLD AUTO: 33.8 % (ref 39–53)
HCT VFR BLD AUTO: 35.5 % (ref 39–53)
HCT VFR BLD AUTO: 35.6 % (ref 39–53)
HCT VFR BLD AUTO: 36.1 % (ref 39–53)
HCT VFR BLD AUTO: 36.5 % (ref 39–53)
HCT VFR BLD AUTO: 36.8 % (ref 39–53)
HCT VFR BLD AUTO: 37.8 % (ref 39–53)
HCT VFR BLD AUTO: 38.4 % (ref 39–53)
HCT VFR BLD AUTO: 40.4 % (ref 39–53)
HGB BLD-MCNC: 11.2 G/DL
HGB BLD-MCNC: 11.7 G/DL (ref 13–17.5)
HGB BLD-MCNC: 11.9 G/DL (ref 13–17.5)
HGB BLD-MCNC: 12 G/DL (ref 13–17.5)
HGB BLD-MCNC: 12.2 G/DL (ref 13–17.5)
HGB BLD-MCNC: 12.3 G/DL (ref 13–17.5)
HGB BLD-MCNC: 12.4 G/DL (ref 13–17.5)
HGB BLD-MCNC: 12.4 G/DL (ref 13–17.5)
HGB BLD-MCNC: 12.6 G/DL (ref 13–17.5)
HGB BLD-MCNC: 12.7 G/DL (ref 13–17.5)
HGB BLD-MCNC: 12.8 G/DL (ref 13–17.5)
HGB BLD-MCNC: 13 G/DL (ref 13–17.5)
HGB BLD-MCNC: 13.1 G/DL (ref 13–17.5)
HGB BLD-MCNC: 13.8 G/DL (ref 13–17.5)
HGB BLD-MCNC: 14.6 G/DL (ref 13–17.5)
HGB RETIC QN AUTO: 41.5 PG (ref 28.2–36.6)
HGB RETIC QN AUTO: 44.2 PG (ref 28.2–36.6)
IMM GRANULOCYTES # BLD AUTO: 0 X10(3) UL (ref 0–1)
IMM GRANULOCYTES # BLD AUTO: 0.01 X10(3) UL (ref 0–1)
IMM GRANULOCYTES # BLD AUTO: 0.02 X10(3) UL (ref 0–1)
IMM GRANULOCYTES # BLD AUTO: 0.02 X10(3) UL (ref 0–1)
IMM GRANULOCYTES # BLD AUTO: 0.03 X10(3) UL (ref 0–1)
IMM GRANULOCYTES # BLD AUTO: 0.04 X10(3) UL (ref 0–1)
IMM GRANULOCYTES # BLD AUTO: 0.17 X10(3) UL (ref 0–1)
IMM GRANULOCYTES NFR BLD: 0 %
IMM GRANULOCYTES NFR BLD: 0.2 %
IMM GRANULOCYTES NFR BLD: 0.2 %
IMM GRANULOCYTES NFR BLD: 0.3 %
IMM GRANULOCYTES NFR BLD: 0.3 %
IMM GRANULOCYTES NFR BLD: 0.4 %
IMM GRANULOCYTES NFR BLD: 0.5 %
IMM GRANULOCYTES NFR BLD: 0.7 %
IMM GRANULOCYTES NFR BLD: 3.3 %
IMM RETICS NFR: 0.09 RATIO (ref 0.1–0.3)
IMM RETICS NFR: 0.19 RATIO (ref 0.1–0.3)
INR BLD: 1.43 (ref 0.88–1.11)
INR BLD: 1.46 (ref 0.89–1.11)
INR BLD: 1.55 (ref 0.88–1.11)
INR BLD: 1.62 (ref 0.89–1.11)
IRON SATURATION: 41 % (ref 20–50)
IRON SERPL-MCNC: 144 UG/DL (ref 65–175)
LIPASE SERPL-CCNC: 494 U/L (ref 73–393)
LIPASE SERPL-CCNC: 631 U/L (ref 73–393)
LYMPHOCYTES # BLD AUTO: 0.22 X10(3) UL (ref 1–4)
LYMPHOCYTES # BLD AUTO: 0.31 X10(3) UL (ref 1–4)
LYMPHOCYTES # BLD AUTO: 0.37 X10(3) UL (ref 1–4)
LYMPHOCYTES # BLD AUTO: 0.38 X10(3) UL (ref 1–4)
LYMPHOCYTES # BLD AUTO: 0.43 X10(3) UL (ref 1–4)
LYMPHOCYTES # BLD AUTO: 0.46 X10(3) UL (ref 1–4)
LYMPHOCYTES # BLD AUTO: 0.47 X10(3) UL (ref 1–4)
LYMPHOCYTES # BLD AUTO: 0.53 X10(3) UL (ref 1–4)
LYMPHOCYTES # BLD AUTO: 0.55 X10(3) UL (ref 1–4)
LYMPHOCYTES # BLD AUTO: 0.59 X10(3) UL (ref 1–4)
LYMPHOCYTES # BLD AUTO: 0.61 X10(3) UL (ref 1–4)
LYMPHOCYTES NFR BLD AUTO: 10.2 %
LYMPHOCYTES NFR BLD AUTO: 10.2 %
LYMPHOCYTES NFR BLD AUTO: 10.7 %
LYMPHOCYTES NFR BLD AUTO: 14 %
LYMPHOCYTES NFR BLD AUTO: 14.7 %
LYMPHOCYTES NFR BLD AUTO: 23.2 %
LYMPHOCYTES NFR BLD AUTO: 26.2 %
LYMPHOCYTES NFR BLD AUTO: 4.2 %
LYMPHOCYTES NFR BLD AUTO: 5.4 %
LYMPHOCYTES NFR BLD AUTO: 8 %
LYMPHOCYTES NFR BLD AUTO: 8.3 %
M PROTEIN MFR SERPL ELPH: 4.9 G/DL (ref 6.4–8.2)
M PROTEIN MFR SERPL ELPH: 5.4 G/DL (ref 6.4–8.2)
M PROTEIN MFR SERPL ELPH: 5.4 G/DL (ref 6.4–8.2)
M PROTEIN MFR SERPL ELPH: 5.8 G/DL (ref 6.4–8.2)
M PROTEIN MFR SERPL ELPH: 5.8 G/DL (ref 6.4–8.2)
M PROTEIN MFR SERPL ELPH: 6.3 G/DL (ref 6.4–8.2)
M PROTEIN MFR SERPL ELPH: 7 G/DL (ref 6.4–8.2)
M PROTEIN MFR SERPL ELPH: 7 G/DL (ref 6.4–8.2)
M PROTEIN MFR SERPL ELPH: 7.2 G/DL (ref 6.4–8.2)
M PROTEIN MFR SERPL ELPH: 7.2 G/DL (ref 6.4–8.2)
MCH RBC QN AUTO: 34.2 PG (ref 26–34)
MCH RBC QN AUTO: 34.5 PG (ref 26–34)
MCH RBC QN AUTO: 34.6 PG (ref 26–34)
MCH RBC QN AUTO: 34.6 PG (ref 26–34)
MCH RBC QN AUTO: 34.8 PG (ref 26–34)
MCH RBC QN AUTO: 35 PG (ref 26–34)
MCH RBC QN AUTO: 35.1 PG (ref 26–34)
MCH RBC QN AUTO: 35.2 PG (ref 26–34)
MCH RBC QN AUTO: 35.3 PG (ref 26–34)
MCH RBC QN AUTO: 35.3 PG (ref 26–34)
MCH RBC QN AUTO: 35.5 PG (ref 26–34)
MCHC RBC AUTO-ENTMCNC: 34.3 G/DL (ref 31–37)
MCHC RBC AUTO-ENTMCNC: 34.4 G/DL (ref 31–37)
MCHC RBC AUTO-ENTMCNC: 34.7 G/DL (ref 31–37)
MCHC RBC AUTO-ENTMCNC: 34.7 G/DL (ref 31–37)
MCHC RBC AUTO-ENTMCNC: 34.8 G/DL (ref 31–37)
MCHC RBC AUTO-ENTMCNC: 34.9 G/DL (ref 31–37)
MCHC RBC AUTO-ENTMCNC: 35.3 G/DL (ref 31–37)
MCHC RBC AUTO-ENTMCNC: 35.5 G/DL (ref 31–37)
MCHC RBC AUTO-ENTMCNC: 35.9 G/DL (ref 31–37)
MCHC RBC AUTO-ENTMCNC: 36 G/DL (ref 31–37)
MCHC RBC AUTO-ENTMCNC: 36.1 G/DL (ref 31–37)
MCV RBC AUTO: 100 FL (ref 80–100)
MCV RBC AUTO: 100 FL (ref 80–100)
MCV RBC AUTO: 100.6 FL (ref 80–100)
MCV RBC AUTO: 100.8 FL (ref 80–100)
MCV RBC AUTO: 101.6 FL
MCV RBC AUTO: 103.2 FL (ref 80–100)
MCV RBC AUTO: 95.7 FL (ref 80–100)
MCV RBC AUTO: 97.1 FL (ref 80–100)
MCV RBC AUTO: 97.3 FL (ref 80–100)
MCV RBC AUTO: 98.2 FL (ref 80–100)
MCV RBC AUTO: 98.7 FL (ref 80–100)
MONOCYTES # BLD AUTO: 0.18 X10(3) UL (ref 0.1–1)
MONOCYTES # BLD AUTO: 0.21 X10(3) UL (ref 0.1–1)
MONOCYTES # BLD AUTO: 0.24 X10(3) UL (ref 0.1–1)
MONOCYTES # BLD AUTO: 0.28 X10(3) UL (ref 0.1–1)
MONOCYTES # BLD AUTO: 0.33 X10(3) UL (ref 0.1–1)
MONOCYTES # BLD AUTO: 0.42 X10(3) UL (ref 0.1–1)
MONOCYTES # BLD AUTO: 0.46 X10(3) UL (ref 0.1–1)
MONOCYTES # BLD AUTO: 0.49 X10(3) UL (ref 0.1–1)
MONOCYTES # BLD AUTO: 0.54 X10(3) UL (ref 0.1–1)
MONOCYTES # BLD AUTO: 0.67 X10(3) UL (ref 0.1–1)
MONOCYTES # BLD AUTO: 0.96 X10(3) UL (ref 0.1–1)
MONOCYTES NFR BLD AUTO: 12.4 %
MONOCYTES NFR BLD AUTO: 16.1 %
MONOCYTES NFR BLD AUTO: 16.7 %
MONOCYTES NFR BLD AUTO: 20.8 %
MONOCYTES NFR BLD AUTO: 3.7 %
MONOCYTES NFR BLD AUTO: 4.6 %
MONOCYTES NFR BLD AUTO: 7.1 %
MONOCYTES NFR BLD AUTO: 8.3 %
MONOCYTES NFR BLD AUTO: 9.5 %
MONOCYTES NFR BLD AUTO: 9.7 %
MONOCYTES NFR BLD AUTO: 9.7 %
NEUTROPHILS # BLD AUTO: 0.95 X10 (3) UL (ref 1.5–7.7)
NEUTROPHILS # BLD AUTO: 0.95 X10(3) UL (ref 1.5–7.7)
NEUTROPHILS # BLD AUTO: 1.13 X10 (3) UL (ref 1.5–7.7)
NEUTROPHILS # BLD AUTO: 1.13 X10(3) UL (ref 1.5–7.7)
NEUTROPHILS # BLD AUTO: 2.59 X10 (3) UL (ref 1.5–7.7)
NEUTROPHILS # BLD AUTO: 2.59 X10(3) UL (ref 1.5–7.7)
NEUTROPHILS # BLD AUTO: 2.62 X10 (3) UL (ref 1.5–7.7)
NEUTROPHILS # BLD AUTO: 2.62 X10(3) UL (ref 1.5–7.7)
NEUTROPHILS # BLD AUTO: 2.71 X10 (3) UL (ref 1.5–7.7)
NEUTROPHILS # BLD AUTO: 2.71 X10(3) UL (ref 1.5–7.7)
NEUTROPHILS # BLD AUTO: 2.97 X10 (3) UL (ref 1.5–7.7)
NEUTROPHILS # BLD AUTO: 2.97 X10(3) UL (ref 1.5–7.7)
NEUTROPHILS # BLD AUTO: 4.29 X10 (3) UL (ref 1.5–7.7)
NEUTROPHILS # BLD AUTO: 4.29 X10(3) UL (ref 1.5–7.7)
NEUTROPHILS # BLD AUTO: 4.42 X10 (3) UL (ref 1.5–7.7)
NEUTROPHILS # BLD AUTO: 4.42 X10(3) UL (ref 1.5–7.7)
NEUTROPHILS # BLD AUTO: 4.59 X10 (3) UL (ref 1.5–7.7)
NEUTROPHILS # BLD AUTO: 4.59 X10(3) UL (ref 1.5–7.7)
NEUTROPHILS # BLD AUTO: 4.77 X10 (3) UL (ref 1.5–7.7)
NEUTROPHILS # BLD AUTO: 4.77 X10(3) UL (ref 1.5–7.7)
NEUTROPHILS # BLD AUTO: 5.18 X10 (3) UL (ref 1.5–7.7)
NEUTROPHILS # BLD AUTO: 5.18 X10(3) UL (ref 1.5–7.7)
NEUTROPHILS NFR BLD AUTO: 47.1 %
NEUTROPHILS NFR BLD AUTO: 61.2 %
NEUTROPHILS NFR BLD AUTO: 64.4 %
NEUTROPHILS NFR BLD AUTO: 71.9 %
NEUTROPHILS NFR BLD AUTO: 73.1 %
NEUTROPHILS NFR BLD AUTO: 75.4 %
NEUTROPHILS NFR BLD AUTO: 75.8 %
NEUTROPHILS NFR BLD AUTO: 79.2 %
NEUTROPHILS NFR BLD AUTO: 80.8 %
NEUTROPHILS NFR BLD AUTO: 87.9 %
NEUTROPHILS NFR BLD AUTO: 90 %
NOROVIRUS GI/GII PCR: NEGATIVE
OSMOLALITY SERPL CALC.SUM OF ELEC: 279 MOSM/KG (ref 275–295)
OSMOLALITY SERPL CALC.SUM OF ELEC: 280 MOSM/KG (ref 275–295)
OSMOLALITY SERPL CALC.SUM OF ELEC: 280 MOSM/KG (ref 275–295)
OSMOLALITY SERPL CALC.SUM OF ELEC: 282 MOSM/KG (ref 275–295)
OSMOLALITY SERPL CALC.SUM OF ELEC: 283 MOSM/KG (ref 275–295)
OSMOLALITY SERPL CALC.SUM OF ELEC: 283 MOSM/KG (ref 275–295)
OSMOLALITY SERPL CALC.SUM OF ELEC: 285 MOSM/KG (ref 275–295)
OSMOLALITY SERPL CALC.SUM OF ELEC: 286 MOSM/KG (ref 275–295)
OSMOLALITY SERPL CALC.SUM OF ELEC: 287 MOSM/KG (ref 275–295)
OSMOLALITY SERPL CALC.SUM OF ELEC: 287 MOSM/KG (ref 275–295)
OSMOLALITY SERPL CALC.SUM OF ELEC: 289 MOSM/KG (ref 275–295)
P SHIGELLOIDES DNA STL QL NAA+PROBE: NEGATIVE
PATIENT FASTING Y/N/NP: NO
PLATELET # BLD AUTO: 33 10(3)UL (ref 150–450)
PLATELET # BLD AUTO: 40 10(3)UL (ref 150–450)
PLATELET # BLD AUTO: 40 10(3)UL (ref 150–450)
PLATELET # BLD AUTO: 44 10(3)UL (ref 150–450)
PLATELET # BLD AUTO: 48 10(3)UL (ref 150–450)
PLATELET # BLD AUTO: 51 10(3)UL (ref 150–450)
PLATELET # BLD AUTO: 54 10(3)UL (ref 150–450)
PLATELET # BLD AUTO: 57 10(3)UL (ref 150–450)
PLATELET # BLD AUTO: 58 10(3)UL (ref 150–450)
PLATELET # BLD AUTO: 67 10(3)UL (ref 150–450)
PLATELET # BLD AUTO: 80 10(3)UL (ref 150–450)
POTASSIUM SERPL-SCNC: 3.1 MMOL/L (ref 3.5–5.1)
POTASSIUM SERPL-SCNC: 3.4 MMOL/L (ref 3.5–5.1)
POTASSIUM SERPL-SCNC: 3.4 MMOL/L (ref 3.5–5.1)
POTASSIUM SERPL-SCNC: 3.5 MMOL/L (ref 3.5–5.1)
POTASSIUM SERPL-SCNC: 3.8 MMOL/L (ref 3.5–5.1)
POTASSIUM SERPL-SCNC: 3.8 MMOL/L (ref 3.5–5.1)
POTASSIUM SERPL-SCNC: 3.9 MMOL/L (ref 3.5–5.1)
POTASSIUM SERPL-SCNC: 3.9 MMOL/L (ref 3.5–5.1)
POTASSIUM SERPL-SCNC: 4 MMOL/L (ref 3.5–5.1)
POTASSIUM SERPL-SCNC: 4.1 MMOL/L (ref 3.5–5.1)
POTASSIUM SERPL-SCNC: 4.1 MMOL/L (ref 3.5–5.1)
PSA SERPL DL<=0.01 NG/ML-MCNC: 17.3 SECONDS (ref 12–14.3)
PSA SERPL DL<=0.01 NG/ML-MCNC: 18.1 SECONDS (ref 12.4–14.6)
PSA SERPL DL<=0.01 NG/ML-MCNC: 18.4 SECONDS (ref 12–14.3)
PSA SERPL DL<=0.01 NG/ML-MCNC: 18.5 SECONDS (ref 12–14.3)
PSA SERPL DL<=0.01 NG/ML-MCNC: 18.5 SECONDS (ref 12–14.3)
PSA SERPL DL<=0.01 NG/ML-MCNC: 19.7 SECONDS (ref 12.4–14.6)
RBC # BLD AUTO: 3.18 X10(6)UL
RBC # BLD AUTO: 3.33 X10(6)UL (ref 4.3–5.7)
RBC # BLD AUTO: 3.44 X10(6)UL (ref 4.3–5.7)
RBC # BLD AUTO: 3.48 X10(6)UL (ref 4.3–5.7)
RBC # BLD AUTO: 3.58 X10(6)UL (ref 4.3–5.7)
RBC # BLD AUTO: 3.65 X10(6)UL (ref 4.3–5.7)
RBC # BLD AUTO: 3.66 X10(6)UL (ref 4.3–5.7)
RBC # BLD AUTO: 3.68 X10(6)UL (ref 4.3–5.7)
RBC # BLD AUTO: 3.83 X10(6)UL (ref 4.3–5.7)
RBC # BLD AUTO: 3.91 X10(6)UL (ref 4.3–5.7)
RBC # BLD AUTO: 4.22 X10(6)UL (ref 4.3–5.7)
RETICS # AUTO: 106.1 X10(3) UL (ref 22.5–147.5)
RETICS # AUTO: 49.1 X10(3) UL (ref 22.5–147.5)
RETICS/RBC NFR AUTO: 1.4 % (ref 0.5–2.5)
RETICS/RBC NFR AUTO: 3 % (ref 0.5–2.5)
RH BLOOD TYPE: POSITIVE
RH BLOOD TYPE: POSITIVE
ROTAVIRUS A PCR: NEGATIVE
SALMONELLA DNA SPEC QL NAA+PROBE: NEGATIVE
SAPOVIRUS PCR: NEGATIVE
SARS-COV-2 RNA RESP QL NAA+PROBE: NOT DETECTED
SED RATE-ML: 8 MM/HR (ref 0–12)
SHIGELLA SP+EIEC IPAH ST NAA+NON-PROBE: NEGATIVE
SODIUM SERPL-SCNC: 132 MMOL/L (ref 136–145)
SODIUM SERPL-SCNC: 133 MMOL/L (ref 136–145)
SODIUM SERPL-SCNC: 134 MMOL/L (ref 136–145)
SODIUM SERPL-SCNC: 135 MMOL/L (ref 136–145)
SODIUM SERPL-SCNC: 135 MMOL/L (ref 136–145)
SODIUM SERPL-SCNC: 136 MMOL/L (ref 136–145)
SODIUM SERPL-SCNC: 136 MMOL/L (ref 136–145)
TOTAL IRON BINDING CAPACITY: 352 UG/DL (ref 240–450)
TRANSFERRIN SERPL-MCNC: 236 MG/DL (ref 200–360)
TSI SER-ACNC: 2.29 MIU/ML (ref 0.36–3.74)
V CHOLERAE DNA SPEC QL NAA+PROBE: NEGATIVE
VIBRIO DNA SPEC NAA+PROBE: NEGATIVE
WBC # BLD AUTO: 1.9 X10(3) UL (ref 4–11)
WBC # BLD AUTO: 2 X10(3) UL (ref 4–11)
WBC # BLD AUTO: 3.5 X10(3) UL (ref 4–11)
WBC # BLD AUTO: 3.7 X10(3) UL (ref 4–11)
WBC # BLD AUTO: 3.9 X10(3) UL (ref 4–11)
WBC # BLD AUTO: 4 X10(3) UL (ref 4–11)
WBC # BLD AUTO: 5.2 X10(3) UL (ref 4–11)
WBC # BLD AUTO: 5.6 X10(3) UL (ref 4–11)
WBC # BLD AUTO: 5.8 X10(3) UL (ref 4–11)
WBC # BLD AUTO: 5.9 X10(3) UL (ref 4–11)
WBC # BLD AUTO: 6 X10(3) UL (ref 4–11)
YERSINIA DNA SPEC NAA+PROBE: NEGATIVE

## 2020-01-01 PROCEDURE — 71045 X-RAY EXAM CHEST 1 VIEW: CPT | Performed by: EMERGENCY MEDICINE

## 2020-01-01 PROCEDURE — 99232 SBSQ HOSP IP/OBS MODERATE 35: CPT | Performed by: HOSPITALIST

## 2020-01-01 PROCEDURE — 99212 OFFICE O/P EST SF 10 MIN: CPT | Performed by: PLASTIC SURGERY

## 2020-01-01 PROCEDURE — 99223 1ST HOSP IP/OBS HIGH 75: CPT | Performed by: HOSPITALIST

## 2020-01-01 PROCEDURE — 0HBJXZZ EXCISION OF LEFT UPPER LEG SKIN, EXTERNAL APPROACH: ICD-10-PCS | Performed by: PLASTIC SURGERY

## 2020-01-01 PROCEDURE — 74177 CT ABD & PELVIS W/CONTRAST: CPT | Performed by: EMERGENCY MEDICINE

## 2020-01-01 PROCEDURE — 99232 SBSQ HOSP IP/OBS MODERATE 35: CPT | Performed by: INTERNAL MEDICINE

## 2020-01-01 PROCEDURE — 99282 EMERGENCY DEPT VISIT SF MDM: CPT

## 2020-01-01 PROCEDURE — 3E0T3BZ INTRODUCTION OF ANESTHETIC AGENT INTO PERIPHERAL NERVES AND PLEXI, PERCUTANEOUS APPROACH: ICD-10-PCS | Performed by: ANESTHESIOLOGY

## 2020-01-01 PROCEDURE — 3074F SYST BP LT 130 MM HG: CPT | Performed by: CLINICAL NURSE SPECIALIST

## 2020-01-01 PROCEDURE — 99215 OFFICE O/P EST HI 40 MIN: CPT | Performed by: INTERNAL MEDICINE

## 2020-01-01 PROCEDURE — 76700 US EXAM ABDOM COMPLETE: CPT | Performed by: INTERNAL MEDICINE

## 2020-01-01 PROCEDURE — 0HRNXK3 REPLACEMENT OF LEFT FOOT SKIN WITH NONAUTOLOGOUS TISSUE SUBSTITUTE, FULL THICKNESS, EXTERNAL APPROACH: ICD-10-PCS | Performed by: PODIATRIST

## 2020-01-01 PROCEDURE — 1111F DSCHRG MED/CURRENT MED MERGE: CPT

## 2020-01-01 PROCEDURE — 99214 OFFICE O/P EST MOD 30 MIN: CPT | Performed by: CLINICAL NURSE SPECIALIST

## 2020-01-01 PROCEDURE — 0DBG8ZX EXCISION OF LEFT LARGE INTESTINE, VIA NATURAL OR ARTIFICIAL OPENING ENDOSCOPIC, DIAGNOSTIC: ICD-10-PCS | Performed by: INTERNAL MEDICINE

## 2020-01-01 PROCEDURE — 99213 OFFICE O/P EST LOW 20 MIN: CPT | Performed by: PODIATRIST

## 2020-01-01 PROCEDURE — 85384 FIBRINOGEN ACTIVITY: CPT

## 2020-01-01 PROCEDURE — 85576 BLOOD PLATELET AGGREGATION: CPT

## 2020-01-01 PROCEDURE — 0DB68ZX EXCISION OF STOMACH, VIA NATURAL OR ARTIFICIAL OPENING ENDOSCOPIC, DIAGNOSTIC: ICD-10-PCS | Performed by: STUDENT IN AN ORGANIZED HEALTH CARE EDUCATION/TRAINING PROGRAM

## 2020-01-01 PROCEDURE — 73620 X-RAY EXAM OF FOOT: CPT | Performed by: INTERNAL MEDICINE

## 2020-01-01 PROCEDURE — 99232 SBSQ HOSP IP/OBS MODERATE 35: CPT | Performed by: SURGERY

## 2020-01-01 PROCEDURE — 15120 SPLT AGRFT F/S/N/H/F/G/M 1ST: CPT | Performed by: PLASTIC SURGERY

## 2020-01-01 PROCEDURE — 74178 CT ABD&PLV WO CNTR FLWD CNTR: CPT | Performed by: INTERNAL MEDICINE

## 2020-01-01 PROCEDURE — 0DB98ZX EXCISION OF DUODENUM, VIA NATURAL OR ARTIFICIAL OPENING ENDOSCOPIC, DIAGNOSTIC: ICD-10-PCS | Performed by: STUDENT IN AN ORGANIZED HEALTH CARE EDUCATION/TRAINING PROGRAM

## 2020-01-01 PROCEDURE — 96413 CHEMO IV INFUSION 1 HR: CPT

## 2020-01-01 PROCEDURE — 93970 EXTREMITY STUDY: CPT | Performed by: EMERGENCY MEDICINE

## 2020-01-01 PROCEDURE — 0DBP8ZX EXCISION OF RECTUM, VIA NATURAL OR ARTIFICIAL OPENING ENDOSCOPIC, DIAGNOSTIC: ICD-10-PCS | Performed by: INTERNAL MEDICINE

## 2020-01-01 PROCEDURE — 99223 1ST HOSP IP/OBS HIGH 75: CPT | Performed by: INTERNAL MEDICINE

## 2020-01-01 PROCEDURE — 99203 OFFICE O/P NEW LOW 30 MIN: CPT | Performed by: PLASTIC SURGERY

## 2020-01-01 PROCEDURE — 99239 HOSP IP/OBS DSCHRG MGMT >30: CPT | Performed by: INTERNAL MEDICINE

## 2020-01-01 PROCEDURE — 99495 TRANSJ CARE MGMT MOD F2F 14D: CPT | Performed by: CLINICAL NURSE SPECIALIST

## 2020-01-01 PROCEDURE — 85347 COAGULATION TIME ACTIVATED: CPT

## 2020-01-01 PROCEDURE — 99233 SBSQ HOSP IP/OBS HIGH 50: CPT | Performed by: HOSPITALIST

## 2020-01-01 PROCEDURE — 1111F DSCHRG MED/CURRENT MED MERGE: CPT | Performed by: CLINICAL NURSE SPECIALIST

## 2020-01-01 PROCEDURE — 0DB98ZX EXCISION OF DUODENUM, VIA NATURAL OR ARTIFICIAL OPENING ENDOSCOPIC, DIAGNOSTIC: ICD-10-PCS | Performed by: INTERNAL MEDICINE

## 2020-01-01 PROCEDURE — 3078F DIAST BP <80 MM HG: CPT | Performed by: CLINICAL NURSE SPECIALIST

## 2020-01-01 PROCEDURE — 99212 OFFICE O/P EST SF 10 MIN: CPT | Performed by: PODIATRIST

## 2020-01-01 PROCEDURE — 0HRNX74 REPLACEMENT OF LEFT FOOT SKIN WITH AUTOLOGOUS TISSUE SUBSTITUTE, PARTIAL THICKNESS, EXTERNAL APPROACH: ICD-10-PCS | Performed by: PLASTIC SURGERY

## 2020-01-01 PROCEDURE — 73722 MRI JOINT OF LWR EXTR W/DYE: CPT | Performed by: INTERNAL MEDICINE

## 2020-01-01 PROCEDURE — 96415 CHEMO IV INFUSION ADDL HR: CPT

## 2020-01-01 PROCEDURE — 15275 SKIN SUB GRAFT FACE/NK/HF/G: CPT | Performed by: PODIATRIST

## 2020-01-01 PROCEDURE — 0DB68ZX EXCISION OF STOMACH, VIA NATURAL OR ARTIFICIAL OPENING ENDOSCOPIC, DIAGNOSTIC: ICD-10-PCS | Performed by: INTERNAL MEDICINE

## 2020-01-01 PROCEDURE — 0DBL8ZX EXCISION OF TRANSVERSE COLON, VIA NATURAL OR ARTIFICIAL OPENING ENDOSCOPIC, DIAGNOSTIC: ICD-10-PCS | Performed by: INTERNAL MEDICINE

## 2020-01-01 PROCEDURE — 71260 CT THORAX DX C+: CPT | Performed by: INTERNAL MEDICINE

## 2020-01-01 PROCEDURE — 0D9670Z DRAINAGE OF STOMACH WITH DRAINAGE DEVICE, VIA NATURAL OR ARTIFICIAL OPENING: ICD-10-PCS | Performed by: STUDENT IN AN ORGANIZED HEALTH CARE EDUCATION/TRAINING PROGRAM

## 2020-01-01 PROCEDURE — 76942 ECHO GUIDE FOR BIOPSY: CPT | Performed by: ANESTHESIOLOGY

## 2020-01-01 PROCEDURE — 99231 SBSQ HOSP IP/OBS SF/LOW 25: CPT | Performed by: HOSPITALIST

## 2020-01-01 PROCEDURE — 0DBK8ZX EXCISION OF ASCENDING COLON, VIA NATURAL OR ARTIFICIAL OPENING ENDOSCOPIC, DIAGNOSTIC: ICD-10-PCS | Performed by: INTERNAL MEDICINE

## 2020-01-01 PROCEDURE — 0DB58ZX EXCISION OF ESOPHAGUS, VIA NATURAL OR ARTIFICIAL OPENING ENDOSCOPIC, DIAGNOSTIC: ICD-10-PCS | Performed by: INTERNAL MEDICINE

## 2020-01-01 PROCEDURE — 15276 SKIN SUB GRAFT F/N/HF/G ADDL: CPT | Performed by: PODIATRIST

## 2020-01-01 PROCEDURE — 15004 WOUND PREP F/N/HF/G: CPT | Performed by: PLASTIC SURGERY

## 2020-01-01 PROCEDURE — 30233R1 TRANSFUSION OF NONAUTOLOGOUS PLATELETS INTO PERIPHERAL VEIN, PERCUTANEOUS APPROACH: ICD-10-PCS | Performed by: INTERNAL MEDICINE

## 2020-01-01 PROCEDURE — 99221 1ST HOSP IP/OBS SF/LOW 40: CPT | Performed by: PODIATRIST

## 2020-01-01 PROCEDURE — 3008F BODY MASS INDEX DOCD: CPT | Performed by: CLINICAL NURSE SPECIALIST

## 2020-01-01 PROCEDURE — 36415 COLL VENOUS BLD VENIPUNCTURE: CPT

## 2020-01-01 PROCEDURE — 99239 HOSP IP/OBS DSCHRG MGMT >30: CPT | Performed by: HOSPITALIST

## 2020-01-01 PROCEDURE — 99223 1ST HOSP IP/OBS HIGH 75: CPT | Performed by: SPECIALIST

## 2020-01-01 PROCEDURE — 99223 1ST HOSP IP/OBS HIGH 75: CPT | Performed by: SURGERY

## 2020-01-01 PROCEDURE — 0DBF8ZX EXCISION OF RIGHT LARGE INTESTINE, VIA NATURAL OR ARTIFICIAL OPENING ENDOSCOPIC, DIAGNOSTIC: ICD-10-PCS | Performed by: INTERNAL MEDICINE

## 2020-01-01 PROCEDURE — 88305 TISSUE EXAM BY PATHOLOGIST: CPT | Performed by: INTERNAL MEDICINE

## 2020-01-01 PROCEDURE — 0JBR0ZZ EXCISION OF LEFT FOOT SUBCUTANEOUS TISSUE AND FASCIA, OPEN APPROACH: ICD-10-PCS | Performed by: PODIATRIST

## 2020-01-01 PROCEDURE — 99024 POSTOP FOLLOW-UP VISIT: CPT | Performed by: PLASTIC SURGERY

## 2020-01-01 PROCEDURE — 2W1TX6Z COMPRESSION OF LEFT FOOT USING PRESSURE DRESSING: ICD-10-PCS | Performed by: PODIATRIST

## 2020-01-01 PROCEDURE — 0DB78ZX EXCISION OF STOMACH, PYLORUS, VIA NATURAL OR ARTIFICIAL OPENING ENDOSCOPIC, DIAGNOSTIC: ICD-10-PCS | Performed by: INTERNAL MEDICINE

## 2020-01-01 PROCEDURE — 73719 MRI LOWER EXTREMITY W/DYE: CPT | Performed by: INTERNAL MEDICINE

## 2020-01-01 PROCEDURE — 99202 OFFICE O/P NEW SF 15 MIN: CPT | Performed by: PLASTIC SURGERY

## 2020-01-01 PROCEDURE — 76700 US EXAM ABDOM COMPLETE: CPT | Performed by: HOSPITALIST

## 2020-01-01 RX ORDER — LIDOCAINE HYDROCHLORIDE 10 MG/ML
INJECTION, SOLUTION EPIDURAL; INFILTRATION; INTRACAUDAL; PERINEURAL AS NEEDED
Status: DISCONTINUED | OUTPATIENT
Start: 2020-01-01 | End: 2020-01-01 | Stop reason: SURG

## 2020-01-01 RX ORDER — VANCOMYCIN HYDROCHLORIDE 125 MG/1
125 CAPSULE ORAL 4 TIMES DAILY
Qty: 40 CAPSULE | Refills: 0 | Status: SHIPPED | OUTPATIENT
Start: 2020-01-01 | End: 2020-01-01

## 2020-01-01 RX ORDER — POTASSIUM CHLORIDE 20 MEQ/1
40 TABLET, EXTENDED RELEASE ORAL ONCE
Status: COMPLETED | OUTPATIENT
Start: 2020-01-01 | End: 2020-01-01

## 2020-01-01 RX ORDER — PROCHLORPERAZINE EDISYLATE 5 MG/ML
5 INJECTION INTRAMUSCULAR; INTRAVENOUS ONCE AS NEEDED
Status: DISCONTINUED | OUTPATIENT
Start: 2020-01-01 | End: 2020-01-01

## 2020-01-01 RX ORDER — ACETAMINOPHEN 500 MG
1000 TABLET ORAL ONCE
Status: CANCELLED | OUTPATIENT
Start: 2020-01-01 | End: 2020-01-01

## 2020-01-01 RX ORDER — HYDROCODONE BITARTRATE AND ACETAMINOPHEN 7.5; 325 MG/1; MG/1
1 TABLET ORAL EVERY 4 HOURS PRN
Status: DISCONTINUED | OUTPATIENT
Start: 2020-01-01 | End: 2020-01-01

## 2020-01-01 RX ORDER — DICYCLOMINE HYDROCHLORIDE 10 MG/1
10 CAPSULE ORAL
Status: DISCONTINUED | OUTPATIENT
Start: 2020-01-01 | End: 2020-01-01

## 2020-01-01 RX ORDER — ACETAMINOPHEN 325 MG/1
650 TABLET ORAL EVERY 6 HOURS PRN
Status: DISCONTINUED | OUTPATIENT
Start: 2020-01-01 | End: 2020-01-01

## 2020-01-01 RX ORDER — VANCOMYCIN HYDROCHLORIDE 125 MG/1
125 CAPSULE ORAL 4 TIMES DAILY
Status: DISCONTINUED | OUTPATIENT
Start: 2020-01-01 | End: 2020-01-01

## 2020-01-01 RX ORDER — ONDANSETRON 2 MG/ML
4 INJECTION INTRAMUSCULAR; INTRAVENOUS EVERY 6 HOURS PRN
Status: DISCONTINUED | OUTPATIENT
Start: 2020-01-01 | End: 2020-01-01

## 2020-01-01 RX ORDER — TRAMADOL HYDROCHLORIDE 50 MG/1
50 TABLET ORAL EVERY 8 HOURS PRN
Qty: 30 TABLET | Refills: 0 | Status: SHIPPED | OUTPATIENT
Start: 2020-01-01 | End: 2020-01-01

## 2020-01-01 RX ORDER — HYDROMORPHONE HYDROCHLORIDE 1 MG/ML
0.4 INJECTION, SOLUTION INTRAMUSCULAR; INTRAVENOUS; SUBCUTANEOUS EVERY 5 MIN PRN
Status: DISCONTINUED | OUTPATIENT
Start: 2020-01-01 | End: 2020-01-01

## 2020-01-01 RX ORDER — SODIUM CHLORIDE 9 MG/ML
INJECTION, SOLUTION INTRAVENOUS CONTINUOUS
Status: ACTIVE | OUTPATIENT
Start: 2020-01-01 | End: 2020-01-01

## 2020-01-01 RX ORDER — METOCLOPRAMIDE HYDROCHLORIDE 5 MG/ML
INJECTION INTRAMUSCULAR; INTRAVENOUS AS NEEDED
Status: DISCONTINUED | OUTPATIENT
Start: 2020-01-01 | End: 2020-01-01 | Stop reason: SURG

## 2020-01-01 RX ORDER — CLINDAMYCIN PHOSPHATE 600 MG/50ML
600 INJECTION INTRAVENOUS EVERY 8 HOURS
Status: DISCONTINUED | OUTPATIENT
Start: 2020-01-01 | End: 2020-01-01

## 2020-01-01 RX ORDER — LEVOFLOXACIN 5 MG/ML
750 INJECTION, SOLUTION INTRAVENOUS ONCE
Status: COMPLETED | OUTPATIENT
Start: 2020-01-01 | End: 2020-01-01

## 2020-01-01 RX ORDER — MULTIVITAMIN WITH FOLIC ACID 400 MCG
1 TABLET ORAL DAILY
COMMUNITY

## 2020-01-01 RX ORDER — KETOROLAC TROMETHAMINE 30 MG/ML
15 INJECTION, SOLUTION INTRAMUSCULAR; INTRAVENOUS ONCE
Status: COMPLETED | OUTPATIENT
Start: 2020-01-01 | End: 2020-01-01

## 2020-01-01 RX ORDER — SODIUM CHLORIDE, SODIUM LACTATE, POTASSIUM CHLORIDE, CALCIUM CHLORIDE 600; 310; 30; 20 MG/100ML; MG/100ML; MG/100ML; MG/100ML
INJECTION, SOLUTION INTRAVENOUS CONTINUOUS
Status: DISCONTINUED | OUTPATIENT
Start: 2020-01-01 | End: 2020-01-01

## 2020-01-01 RX ORDER — LORAZEPAM 2 MG/ML
1 INJECTION INTRAMUSCULAR ONCE
Status: COMPLETED | OUTPATIENT
Start: 2020-01-01 | End: 2020-01-01

## 2020-01-01 RX ORDER — LACTULOSE 10 G/15ML
10 SOLUTION ORAL EVERY OTHER DAY
Status: DISCONTINUED | OUTPATIENT
Start: 2020-01-01 | End: 2020-01-01

## 2020-01-01 RX ORDER — SODIUM CHLORIDE 9 MG/ML
INJECTION, SOLUTION INTRAVENOUS CONTINUOUS
Status: DISCONTINUED | OUTPATIENT
Start: 2020-01-01 | End: 2020-01-01

## 2020-01-01 RX ORDER — ONDANSETRON 2 MG/ML
4 INJECTION INTRAMUSCULAR; INTRAVENOUS EVERY 4 HOURS PRN
Status: DISCONTINUED | OUTPATIENT
Start: 2020-01-01 | End: 2020-01-01

## 2020-01-01 RX ORDER — PANTOPRAZOLE SODIUM 40 MG/1
40 TABLET, DELAYED RELEASE ORAL DAILY
Status: DISCONTINUED | OUTPATIENT
Start: 2020-01-01 | End: 2020-01-01

## 2020-01-01 RX ORDER — FUROSEMIDE 40 MG/1
40 TABLET ORAL 2 TIMES DAILY
Status: DISCONTINUED | OUTPATIENT
Start: 2020-01-01 | End: 2020-01-01

## 2020-01-01 RX ORDER — ZOLPIDEM TARTRATE 5 MG/1
5 TABLET ORAL NIGHTLY PRN
Status: DISCONTINUED | OUTPATIENT
Start: 2020-01-01 | End: 2020-01-01

## 2020-01-01 RX ORDER — SODIUM CHLORIDE 9 MG/ML
1000 INJECTION, SOLUTION INTRAVENOUS ONCE
Status: COMPLETED | OUTPATIENT
Start: 2020-01-01 | End: 2020-01-01

## 2020-01-01 RX ORDER — METRONIDAZOLE 500 MG/1
500 TABLET ORAL EVERY 8 HOURS SCHEDULED
Status: DISCONTINUED | OUTPATIENT
Start: 2020-01-01 | End: 2020-01-01

## 2020-01-01 RX ORDER — METOPROLOL TARTRATE 5 MG/5ML
2.5 INJECTION INTRAVENOUS ONCE
Status: DISCONTINUED | OUTPATIENT
Start: 2020-01-01 | End: 2020-01-01

## 2020-01-01 RX ORDER — ACETAMINOPHEN 500 MG
TABLET ORAL 2 TIMES DAILY PRN
Status: ON HOLD | COMMUNITY
End: 2021-01-01

## 2020-01-01 RX ORDER — HYDROMORPHONE HYDROCHLORIDE 1 MG/ML
0.4 INJECTION, SOLUTION INTRAMUSCULAR; INTRAVENOUS; SUBCUTANEOUS EVERY 5 MIN PRN
Status: DISCONTINUED | OUTPATIENT
Start: 2020-01-01 | End: 2020-01-01 | Stop reason: HOSPADM

## 2020-01-01 RX ORDER — CARVEDILOL 12.5 MG/1
12.5 TABLET ORAL 2 TIMES DAILY WITH MEALS
Status: DISCONTINUED | OUTPATIENT
Start: 2020-01-01 | End: 2020-01-01

## 2020-01-01 RX ORDER — KETOROLAC TROMETHAMINE 30 MG/ML
INJECTION, SOLUTION INTRAMUSCULAR; INTRAVENOUS AS NEEDED
Status: DISCONTINUED | OUTPATIENT
Start: 2020-01-01 | End: 2020-01-01 | Stop reason: SURG

## 2020-01-01 RX ORDER — TRAMADOL HYDROCHLORIDE 50 MG/1
50 TABLET ORAL EVERY 8 HOURS PRN
Status: DISCONTINUED | OUTPATIENT
Start: 2020-01-01 | End: 2020-01-01

## 2020-01-01 RX ORDER — TRAMADOL HYDROCHLORIDE 50 MG/1
50 TABLET ORAL EVERY 6 HOURS PRN
Qty: 15 TABLET | Refills: 0 | Status: SHIPPED | OUTPATIENT
Start: 2020-01-01 | End: 2020-01-01

## 2020-01-01 RX ORDER — NALOXONE HYDROCHLORIDE 0.4 MG/ML
80 INJECTION, SOLUTION INTRAMUSCULAR; INTRAVENOUS; SUBCUTANEOUS AS NEEDED
Status: CANCELLED | OUTPATIENT
Start: 2020-01-01 | End: 2020-01-01

## 2020-01-01 RX ORDER — METOCLOPRAMIDE HYDROCHLORIDE 5 MG/ML
10 INJECTION INTRAMUSCULAR; INTRAVENOUS AS NEEDED
Status: DISCONTINUED | OUTPATIENT
Start: 2020-01-01 | End: 2020-01-01 | Stop reason: HOSPADM

## 2020-01-01 RX ORDER — ESZOPICLONE 3 MG/1
TABLET, FILM COATED ORAL
Qty: 30 TABLET | Refills: 0 | Status: SHIPPED | OUTPATIENT
Start: 2020-01-01 | End: 2020-01-01 | Stop reason: CLARIF

## 2020-01-01 RX ORDER — HEPARIN SODIUM 5000 [USP'U]/ML
5000 INJECTION, SOLUTION INTRAVENOUS; SUBCUTANEOUS EVERY 8 HOURS SCHEDULED
Status: DISCONTINUED | OUTPATIENT
Start: 2020-01-01 | End: 2020-01-01

## 2020-01-01 RX ORDER — SUCRALFATE ORAL 1 G/10ML
1 SUSPENSION ORAL
Status: DISCONTINUED | OUTPATIENT
Start: 2020-01-01 | End: 2020-01-01

## 2020-01-01 RX ORDER — METOCLOPRAMIDE HYDROCHLORIDE 5 MG/ML
10 INJECTION INTRAMUSCULAR; INTRAVENOUS EVERY 6 HOURS PRN
Status: DISCONTINUED | OUTPATIENT
Start: 2020-01-01 | End: 2020-01-01

## 2020-01-01 RX ORDER — METRONIDAZOLE 500 MG/1
500 TABLET ORAL 2 TIMES DAILY
Qty: 30 TABLET | Refills: 0 | Status: SHIPPED | OUTPATIENT
Start: 2020-01-01 | End: 2020-01-01

## 2020-01-01 RX ORDER — PREDNISONE 20 MG/1
20 TABLET ORAL DAILY
Status: DISCONTINUED | OUTPATIENT
Start: 2020-01-01 | End: 2020-01-01

## 2020-01-01 RX ORDER — LEVOFLOXACIN 5 MG/ML
750 INJECTION, SOLUTION INTRAVENOUS EVERY 24 HOURS
Status: DISCONTINUED | OUTPATIENT
Start: 2020-01-01 | End: 2020-01-01

## 2020-01-01 RX ORDER — VANCOMYCIN 2 GRAM/500 ML IN 0.9 % SODIUM CHLORIDE INTRAVENOUS
25 EVERY 8 HOURS
Status: DISCONTINUED | OUTPATIENT
Start: 2020-01-01 | End: 2020-01-01

## 2020-01-01 RX ORDER — ACETAMINOPHEN 500 MG
1000 TABLET ORAL ONCE
Status: COMPLETED | OUTPATIENT
Start: 2020-01-01 | End: 2020-01-01

## 2020-01-01 RX ORDER — HYDROMORPHONE HYDROCHLORIDE 1 MG/ML
0.2 INJECTION, SOLUTION INTRAMUSCULAR; INTRAVENOUS; SUBCUTANEOUS EVERY 5 MIN PRN
Status: DISCONTINUED | OUTPATIENT
Start: 2020-01-01 | End: 2020-01-01

## 2020-01-01 RX ORDER — VANCOMYCIN 2 GRAM/500 ML IN 0.9 % SODIUM CHLORIDE INTRAVENOUS
25 ONCE
Status: COMPLETED | OUTPATIENT
Start: 2020-01-01 | End: 2020-01-01

## 2020-01-01 RX ORDER — HYDROCODONE BITARTRATE AND ACETAMINOPHEN 5; 325 MG/1; MG/1
1 TABLET ORAL EVERY 4 HOURS PRN
Status: DISCONTINUED | OUTPATIENT
Start: 2020-01-01 | End: 2020-01-01

## 2020-01-01 RX ORDER — HYDROCODONE BITARTRATE AND ACETAMINOPHEN 7.5; 325 MG/1; MG/1
TABLET ORAL
Qty: 25 TABLET | Refills: 0 | Status: SHIPPED | OUTPATIENT
Start: 2020-01-01 | End: 2020-01-01

## 2020-01-01 RX ORDER — SODIUM CHLORIDE, SODIUM LACTATE, POTASSIUM CHLORIDE, CALCIUM CHLORIDE 600; 310; 30; 20 MG/100ML; MG/100ML; MG/100ML; MG/100ML
INJECTION, SOLUTION INTRAVENOUS CONTINUOUS PRN
Status: DISCONTINUED | OUTPATIENT
Start: 2020-01-01 | End: 2020-01-01 | Stop reason: SURG

## 2020-01-01 RX ORDER — BENZONATATE 100 MG/1
100 CAPSULE ORAL 3 TIMES DAILY PRN
Status: DISCONTINUED | OUTPATIENT
Start: 2020-01-01 | End: 2020-01-01

## 2020-01-01 RX ORDER — ONDANSETRON 4 MG/1
4 TABLET, ORALLY DISINTEGRATING ORAL EVERY 8 HOURS PRN
Status: DISCONTINUED | OUTPATIENT
Start: 2020-01-01 | End: 2020-01-01

## 2020-01-01 RX ORDER — SODIUM CHLORIDE 9 MG/ML
INJECTION, SOLUTION INTRAVENOUS ONCE
Status: COMPLETED | OUTPATIENT
Start: 2020-01-01 | End: 2020-01-01

## 2020-01-01 RX ORDER — HYDROMORPHONE HYDROCHLORIDE 1 MG/ML
0.5 INJECTION, SOLUTION INTRAMUSCULAR; INTRAVENOUS; SUBCUTANEOUS ONCE
Status: COMPLETED | OUTPATIENT
Start: 2020-01-01 | End: 2020-01-01

## 2020-01-01 RX ORDER — NALOXONE HYDROCHLORIDE 0.4 MG/ML
80 INJECTION, SOLUTION INTRAMUSCULAR; INTRAVENOUS; SUBCUTANEOUS AS NEEDED
Status: DISCONTINUED | OUTPATIENT
Start: 2020-01-01 | End: 2020-01-01

## 2020-01-01 RX ORDER — PREDNISONE 20 MG/1
40 TABLET ORAL DAILY
Status: ON HOLD | COMMUNITY
Start: 2020-01-01 | End: 2020-01-01

## 2020-01-01 RX ORDER — BISACODYL 10 MG
10 SUPPOSITORY, RECTAL RECTAL
Status: DISCONTINUED | OUTPATIENT
Start: 2020-01-01 | End: 2020-01-01

## 2020-01-01 RX ORDER — HYDROCODONE BITARTRATE AND ACETAMINOPHEN 10; 325 MG/1; MG/1
2 TABLET ORAL AS NEEDED
Status: DISCONTINUED | OUTPATIENT
Start: 2020-01-01 | End: 2020-01-01 | Stop reason: HOSPADM

## 2020-01-01 RX ORDER — EPHEDRINE SULFATE 50 MG/ML
INJECTION, SOLUTION INTRAVENOUS AS NEEDED
Status: DISCONTINUED | OUTPATIENT
Start: 2020-01-01 | End: 2020-01-01 | Stop reason: SURG

## 2020-01-01 RX ORDER — CEPHALEXIN 500 MG/1
500 CAPSULE ORAL 2 TIMES DAILY
Status: ON HOLD | COMMUNITY
Start: 2020-01-01 | End: 2020-01-01

## 2020-01-01 RX ORDER — ONDANSETRON 2 MG/ML
4 INJECTION INTRAMUSCULAR; INTRAVENOUS AS NEEDED
Status: DISCONTINUED | OUTPATIENT
Start: 2020-01-01 | End: 2020-01-01 | Stop reason: HOSPADM

## 2020-01-01 RX ORDER — MORPHINE SULFATE 2 MG/ML
2 INJECTION, SOLUTION INTRAMUSCULAR; INTRAVENOUS EVERY 4 HOURS PRN
Status: DISCONTINUED | OUTPATIENT
Start: 2020-01-01 | End: 2020-01-01

## 2020-01-01 RX ORDER — MORPHINE SULFATE 4 MG/ML
4 INJECTION, SOLUTION INTRAMUSCULAR; INTRAVENOUS EVERY 10 MIN PRN
Status: DISCONTINUED | OUTPATIENT
Start: 2020-01-01 | End: 2020-01-01

## 2020-01-01 RX ORDER — FOLIC ACID 1 MG/1
1 TABLET ORAL DAILY
Status: DISCONTINUED | OUTPATIENT
Start: 2020-01-01 | End: 2020-01-01

## 2020-01-01 RX ORDER — TRAMADOL HYDROCHLORIDE 50 MG/1
50 TABLET ORAL EVERY 6 HOURS PRN
Status: DISCONTINUED | OUTPATIENT
Start: 2020-01-01 | End: 2020-01-01

## 2020-01-01 RX ORDER — MIDAZOLAM HYDROCHLORIDE 1 MG/ML
INJECTION INTRAMUSCULAR; INTRAVENOUS AS NEEDED
Status: DISCONTINUED | OUTPATIENT
Start: 2020-01-01 | End: 2020-01-01 | Stop reason: SURG

## 2020-01-01 RX ORDER — ENOXAPARIN SODIUM 100 MG/ML
40 INJECTION SUBCUTANEOUS DAILY
Status: DISCONTINUED | OUTPATIENT
Start: 2020-01-01 | End: 2020-01-01

## 2020-01-01 RX ORDER — CARVEDILOL 12.5 MG/1
12.5 TABLET ORAL
Status: DISCONTINUED | OUTPATIENT
Start: 2020-01-01 | End: 2020-01-01

## 2020-01-01 RX ORDER — HYDROMORPHONE HYDROCHLORIDE 1 MG/ML
0.5 INJECTION, SOLUTION INTRAMUSCULAR; INTRAVENOUS; SUBCUTANEOUS
Status: DISCONTINUED | OUTPATIENT
Start: 2020-01-01 | End: 2020-01-01

## 2020-01-01 RX ORDER — FOLIC ACID 1 MG/1
1 TABLET ORAL DAILY
COMMUNITY

## 2020-01-01 RX ORDER — MORPHINE SULFATE 4 MG/ML
2 INJECTION, SOLUTION INTRAMUSCULAR; INTRAVENOUS EVERY 10 MIN PRN
Status: DISCONTINUED | OUTPATIENT
Start: 2020-01-01 | End: 2020-01-01

## 2020-01-01 RX ORDER — HALOPERIDOL 5 MG/ML
0.25 INJECTION INTRAMUSCULAR ONCE AS NEEDED
Status: DISCONTINUED | OUTPATIENT
Start: 2020-01-01 | End: 2020-01-01

## 2020-01-01 RX ORDER — DEXAMETHASONE SODIUM PHOSPHATE 4 MG/ML
VIAL (ML) INJECTION AS NEEDED
Status: DISCONTINUED | OUTPATIENT
Start: 2020-01-01 | End: 2020-01-01 | Stop reason: SURG

## 2020-01-01 RX ORDER — CEFAZOLIN SODIUM/WATER 2 G/20 ML
2 SYRINGE (ML) INTRAVENOUS EVERY 8 HOURS
Status: DISCONTINUED | OUTPATIENT
Start: 2020-01-01 | End: 2020-01-01

## 2020-01-01 RX ORDER — METHYLPREDNISOLONE SODIUM SUCCINATE 125 MG/2ML
125 INJECTION, POWDER, LYOPHILIZED, FOR SOLUTION INTRAMUSCULAR; INTRAVENOUS ONCE
Status: COMPLETED | OUTPATIENT
Start: 2020-01-01 | End: 2020-01-01

## 2020-01-01 RX ORDER — ACETAMINOPHEN 500 MG
500 TABLET ORAL EVERY 6 HOURS PRN
Status: ON HOLD | COMMUNITY
End: 2020-01-01

## 2020-01-01 RX ORDER — ESZOPICLONE 3 MG/1
TABLET, FILM COATED ORAL
Qty: 30 TABLET | Refills: 3 | Status: SHIPPED | OUTPATIENT
Start: 2020-01-01 | End: 2020-01-01

## 2020-01-01 RX ORDER — PREDNISONE 20 MG/1
60 TABLET ORAL DAILY
Status: DISCONTINUED | OUTPATIENT
Start: 2020-01-01 | End: 2020-01-01

## 2020-01-01 RX ORDER — CALCIUM CARBONATE 200(500)MG
500 TABLET,CHEWABLE ORAL
Status: DISCONTINUED | OUTPATIENT
Start: 2020-01-01 | End: 2020-01-01

## 2020-01-01 RX ORDER — NALOXONE HYDROCHLORIDE 0.4 MG/ML
80 INJECTION, SOLUTION INTRAMUSCULAR; INTRAVENOUS; SUBCUTANEOUS AS NEEDED
Status: DISCONTINUED | OUTPATIENT
Start: 2020-01-01 | End: 2020-01-01 | Stop reason: HOSPADM

## 2020-01-01 RX ORDER — HYDROMORPHONE HYDROCHLORIDE 1 MG/ML
0.3 INJECTION, SOLUTION INTRAMUSCULAR; INTRAVENOUS; SUBCUTANEOUS EVERY 5 MIN PRN
Status: DISCONTINUED | OUTPATIENT
Start: 2020-01-01 | End: 2020-01-01 | Stop reason: HOSPADM

## 2020-01-01 RX ORDER — ESZOPICLONE 3 MG/1
3 TABLET, FILM COATED ORAL NIGHTLY
Qty: 30 TABLET | Refills: 5 | Status: SHIPPED | OUTPATIENT
Start: 2020-01-01 | End: 2021-01-01

## 2020-01-01 RX ORDER — ONDANSETRON 2 MG/ML
4 INJECTION INTRAMUSCULAR; INTRAVENOUS ONCE AS NEEDED
Status: DISCONTINUED | OUTPATIENT
Start: 2020-01-01 | End: 2020-01-01

## 2020-01-01 RX ORDER — FOLIC ACID 1 MG/1
TABLET ORAL
Qty: 90 TABLET | Refills: 3 | Status: SHIPPED | OUTPATIENT
Start: 2020-01-01 | End: 2020-01-01 | Stop reason: CLARIF

## 2020-01-01 RX ORDER — SODIUM CHLORIDE, SODIUM LACTATE, POTASSIUM CHLORIDE, CALCIUM CHLORIDE 600; 310; 30; 20 MG/100ML; MG/100ML; MG/100ML; MG/100ML
INJECTION, SOLUTION INTRAVENOUS CONTINUOUS
Status: DISCONTINUED | OUTPATIENT
Start: 2020-01-01 | End: 2020-01-01 | Stop reason: HOSPADM

## 2020-01-01 RX ORDER — METOCLOPRAMIDE 10 MG/1
10 TABLET ORAL ONCE
Status: COMPLETED | OUTPATIENT
Start: 2020-01-01 | End: 2020-01-01

## 2020-01-01 RX ORDER — SPIRONOLACTONE 50 MG/1
TABLET, FILM COATED ORAL
Qty: 90 TABLET | Refills: 1 | Status: ON HOLD | OUTPATIENT
Start: 2020-01-01 | End: 2020-01-01

## 2020-01-01 RX ORDER — MORPHINE SULFATE 4 MG/ML
4 INJECTION, SOLUTION INTRAMUSCULAR; INTRAVENOUS ONCE
Status: COMPLETED | OUTPATIENT
Start: 2020-01-01 | End: 2020-01-01

## 2020-01-01 RX ORDER — ONDANSETRON 2 MG/ML
4 INJECTION INTRAMUSCULAR; INTRAVENOUS ONCE
Status: COMPLETED | OUTPATIENT
Start: 2020-01-01 | End: 2020-01-01

## 2020-01-01 RX ORDER — HYDROCODONE BITARTRATE AND ACETAMINOPHEN 10; 325 MG/1; MG/1
1 TABLET ORAL AS NEEDED
Status: DISCONTINUED | OUTPATIENT
Start: 2020-01-01 | End: 2020-01-01 | Stop reason: HOSPADM

## 2020-01-01 RX ORDER — METHYLPREDNISOLONE SODIUM SUCCINATE 40 MG/ML
80 INJECTION, POWDER, LYOPHILIZED, FOR SOLUTION INTRAMUSCULAR; INTRAVENOUS EVERY 12 HOURS
Status: DISCONTINUED | OUTPATIENT
Start: 2020-01-01 | End: 2020-01-01

## 2020-01-01 RX ORDER — PANTOPRAZOLE SODIUM 40 MG/1
40 TABLET, DELAYED RELEASE ORAL
Qty: 90 TABLET | Refills: 3 | Status: SHIPPED | OUTPATIENT
Start: 2020-01-01 | End: 2021-01-01

## 2020-01-01 RX ORDER — PANTOPRAZOLE SODIUM 40 MG/1
40 TABLET, DELAYED RELEASE ORAL
Status: DISCONTINUED | OUTPATIENT
Start: 2020-01-01 | End: 2020-01-01

## 2020-01-01 RX ORDER — PREDNISONE 1 MG/1
10 TABLET ORAL DAILY
Qty: 21 TABLET | Refills: 0 | Status: SHIPPED | OUTPATIENT
Start: 2020-01-01 | End: 2020-01-01 | Stop reason: CLARIF

## 2020-01-01 RX ORDER — DEXTROSE MONOHYDRATE 25 G/50ML
50 INJECTION, SOLUTION INTRAVENOUS
Status: DISCONTINUED | OUTPATIENT
Start: 2020-01-01 | End: 2020-01-01 | Stop reason: HOSPADM

## 2020-01-01 RX ORDER — CARVEDILOL 12.5 MG/1
12.5 TABLET ORAL 2 TIMES DAILY WITH MEALS
Status: ON HOLD | COMMUNITY
End: 2021-01-01

## 2020-01-01 RX ORDER — MORPHINE SULFATE 2 MG/ML
2 INJECTION, SOLUTION INTRAMUSCULAR; INTRAVENOUS ONCE
Status: COMPLETED | OUTPATIENT
Start: 2020-01-01 | End: 2020-01-01

## 2020-01-01 RX ORDER — SODIUM PHOSPHATE, DIBASIC AND SODIUM PHOSPHATE, MONOBASIC 7; 19 G/133ML; G/133ML
1 ENEMA RECTAL ONCE AS NEEDED
Status: DISCONTINUED | OUTPATIENT
Start: 2020-01-01 | End: 2020-01-01

## 2020-01-01 RX ORDER — SUCRALFATE ORAL 1 G/10ML
1 SUSPENSION ORAL
Qty: 1 BOTTLE | Refills: 1 | Status: SHIPPED | OUTPATIENT
Start: 2020-01-01 | End: 2020-01-01

## 2020-01-01 RX ORDER — ONDANSETRON 2 MG/ML
INJECTION INTRAMUSCULAR; INTRAVENOUS AS NEEDED
Status: DISCONTINUED | OUTPATIENT
Start: 2020-01-01 | End: 2020-01-01 | Stop reason: SURG

## 2020-01-01 RX ORDER — MAGNESIUM SULFATE HEPTAHYDRATE 40 MG/ML
2 INJECTION, SOLUTION INTRAVENOUS ONCE
Status: COMPLETED | OUTPATIENT
Start: 2020-01-01 | End: 2020-01-01

## 2020-01-01 RX ORDER — PREDNISONE 20 MG/1
100 TABLET ORAL DAILY
Qty: 120 TABLET | Refills: 0 | Status: SHIPPED | OUTPATIENT
Start: 2020-01-01 | End: 2020-01-01

## 2020-01-01 RX ORDER — HYDROCODONE BITARTRATE AND ACETAMINOPHEN 5; 325 MG/1; MG/1
1 TABLET ORAL AS NEEDED
Status: DISCONTINUED | OUTPATIENT
Start: 2020-01-01 | End: 2020-01-01

## 2020-01-01 RX ORDER — CLINDAMYCIN PHOSPHATE 900 MG/50ML
900 INJECTION INTRAVENOUS ONCE
Status: DISCONTINUED | OUTPATIENT
Start: 2020-01-01 | End: 2020-01-01 | Stop reason: HOSPADM

## 2020-01-01 RX ORDER — TRAMADOL HYDROCHLORIDE 50 MG/1
TABLET ORAL EVERY 6 HOURS PRN
Status: DISCONTINUED | OUTPATIENT
Start: 2020-01-01 | End: 2020-01-01 | Stop reason: SDUPTHER

## 2020-01-01 RX ORDER — PREDNISONE 10 MG/1
10 TABLET ORAL DAILY
Status: DISCONTINUED | OUTPATIENT
Start: 2020-01-01 | End: 2020-01-01

## 2020-01-01 RX ORDER — ZOLPIDEM TARTRATE 5 MG/1
5 TABLET ORAL NIGHTLY PRN
Refills: 5 | Status: DISCONTINUED | OUTPATIENT
Start: 2020-01-01 | End: 2020-01-01

## 2020-01-01 RX ORDER — MORPHINE SULFATE 10 MG/ML
6 INJECTION, SOLUTION INTRAMUSCULAR; INTRAVENOUS EVERY 10 MIN PRN
Status: DISCONTINUED | OUTPATIENT
Start: 2020-01-01 | End: 2020-01-01

## 2020-01-01 RX ORDER — POTASSIUM CHLORIDE 20 MEQ/1
40 TABLET, EXTENDED RELEASE ORAL EVERY 4 HOURS
Status: COMPLETED | OUTPATIENT
Start: 2020-01-01 | End: 2020-01-01

## 2020-01-01 RX ORDER — ESZOPICLONE 3 MG/1
3 TABLET, FILM COATED ORAL NIGHTLY
COMMUNITY
End: 2020-01-01

## 2020-01-01 RX ORDER — HYDROMORPHONE HYDROCHLORIDE 1 MG/ML
0.5 INJECTION, SOLUTION INTRAMUSCULAR; INTRAVENOUS; SUBCUTANEOUS EVERY 30 MIN PRN
Status: ACTIVE | OUTPATIENT
Start: 2020-01-01 | End: 2020-01-01

## 2020-01-01 RX ORDER — LEVOFLOXACIN 750 MG/1
750 TABLET ORAL DAILY
Qty: 15 TABLET | Refills: 0 | Status: SHIPPED | OUTPATIENT
Start: 2020-01-01 | End: 2020-01-01

## 2020-01-01 RX ORDER — METOCLOPRAMIDE HYDROCHLORIDE 5 MG/ML
5 INJECTION INTRAMUSCULAR; INTRAVENOUS EVERY 8 HOURS PRN
Status: DISCONTINUED | OUTPATIENT
Start: 2020-01-01 | End: 2020-01-01

## 2020-01-01 RX ORDER — SPIRONOLACTONE 50 MG/1
50 TABLET, FILM COATED ORAL DAILY
COMMUNITY
End: 2021-01-01 | Stop reason: ALTCHOICE

## 2020-01-01 RX ORDER — SIMETHICONE 80 MG
80 TABLET,CHEWABLE ORAL 4 TIMES DAILY PRN
Status: DISCONTINUED | OUTPATIENT
Start: 2020-01-01 | End: 2020-01-01

## 2020-01-01 RX ORDER — FUROSEMIDE 10 MG/ML
20 INJECTION INTRAMUSCULAR; INTRAVENOUS
Status: DISCONTINUED | OUTPATIENT
Start: 2020-01-01 | End: 2020-01-01

## 2020-01-01 RX ORDER — LIDOCAINE HYDROCHLORIDE 20 MG/ML
10 JELLY TOPICAL ONCE
Status: COMPLETED | OUTPATIENT
Start: 2020-01-01 | End: 2020-01-01

## 2020-01-01 RX ORDER — FAMOTIDINE 20 MG/1
20 TABLET ORAL ONCE
Status: COMPLETED | OUTPATIENT
Start: 2020-01-01 | End: 2020-01-01

## 2020-01-01 RX ORDER — CEPHALEXIN 500 MG/1
500 CAPSULE ORAL 2 TIMES DAILY
Qty: 14 CAPSULE | Refills: 0 | Status: SHIPPED | OUTPATIENT
Start: 2020-01-01 | End: 2020-01-01

## 2020-01-01 RX ORDER — POLYETHYLENE GLYCOL 3350 17 G/17G
17 POWDER, FOR SOLUTION ORAL DAILY PRN
Status: DISCONTINUED | OUTPATIENT
Start: 2020-01-01 | End: 2020-01-01

## 2020-01-01 RX ORDER — HYDROCODONE BITARTRATE AND ACETAMINOPHEN 5; 325 MG/1; MG/1
2 TABLET ORAL AS NEEDED
Status: DISCONTINUED | OUTPATIENT
Start: 2020-01-01 | End: 2020-01-01

## 2020-01-01 RX ORDER — SODIUM CHLORIDE, SODIUM LACTATE, POTASSIUM CHLORIDE, CALCIUM CHLORIDE 600; 310; 30; 20 MG/100ML; MG/100ML; MG/100ML; MG/100ML
INJECTION, SOLUTION INTRAVENOUS CONTINUOUS
Status: CANCELLED | OUTPATIENT
Start: 2020-01-01

## 2020-01-01 RX ORDER — HYDROCODONE BITARTRATE AND ACETAMINOPHEN 5; 325 MG/1; MG/1
2 TABLET ORAL EVERY 4 HOURS PRN
Status: DISCONTINUED | OUTPATIENT
Start: 2020-01-01 | End: 2020-01-01

## 2020-01-01 RX ORDER — TRAMADOL HYDROCHLORIDE 50 MG/1
100 TABLET ORAL EVERY 6 HOURS PRN
Status: DISCONTINUED | OUTPATIENT
Start: 2020-01-01 | End: 2020-01-01

## 2020-01-01 RX ORDER — MAGNESIUM HYDROXIDE/ALUMINUM HYDROXICE/SIMETHICONE 120; 1200; 1200 MG/30ML; MG/30ML; MG/30ML
30 SUSPENSION ORAL DAILY PRN
Status: DISCONTINUED | OUTPATIENT
Start: 2020-01-01 | End: 2020-01-01

## 2020-01-01 RX ORDER — PANTOPRAZOLE SODIUM 40 MG/1
40 TABLET, DELAYED RELEASE ORAL DAILY
Qty: 60 TABLET | Refills: 2 | Status: SHIPPED | OUTPATIENT
Start: 2020-01-01 | End: 2020-01-01 | Stop reason: CLARIF

## 2020-01-01 RX ORDER — HYDROMORPHONE HYDROCHLORIDE 1 MG/ML
0.6 INJECTION, SOLUTION INTRAMUSCULAR; INTRAVENOUS; SUBCUTANEOUS EVERY 5 MIN PRN
Status: DISCONTINUED | OUTPATIENT
Start: 2020-01-01 | End: 2020-01-01

## 2020-01-01 RX ORDER — METRONIDAZOLE 500 MG/100ML
500 INJECTION, SOLUTION INTRAVENOUS EVERY 8 HOURS
Status: DISCONTINUED | OUTPATIENT
Start: 2020-01-01 | End: 2020-01-01

## 2020-01-01 RX ORDER — SPIRONOLACTONE 25 MG/1
50 TABLET ORAL DAILY
Status: DISCONTINUED | OUTPATIENT
Start: 2020-01-01 | End: 2020-01-01

## 2020-01-01 RX ADMIN — EPHEDRINE SULFATE 5 MG: 50 INJECTION, SOLUTION INTRAVENOUS at 14:24:00

## 2020-01-01 RX ADMIN — DEXAMETHASONE SODIUM PHOSPHATE 4 MG: 4 MG/ML VIAL (ML) INJECTION at 14:49:00

## 2020-01-01 RX ADMIN — ONDANSETRON 4 MG: 2 INJECTION INTRAMUSCULAR; INTRAVENOUS at 14:49:00

## 2020-01-01 RX ADMIN — SODIUM CHLORIDE, SODIUM LACTATE, POTASSIUM CHLORIDE, CALCIUM CHLORIDE: 600; 310; 30; 20 INJECTION, SOLUTION INTRAVENOUS at 14:04:00

## 2020-01-01 RX ADMIN — DEXAMETHASONE SODIUM PHOSPHATE 4 MG: 4 MG/ML VIAL (ML) INJECTION at 18:00:00

## 2020-01-01 RX ADMIN — LIDOCAINE HYDROCHLORIDE 50 MG: 10 INJECTION, SOLUTION EPIDURAL; INFILTRATION; INTRACAUDAL; PERINEURAL at 14:12:00

## 2020-01-01 RX ADMIN — SODIUM CHLORIDE, SODIUM LACTATE, POTASSIUM CHLORIDE, CALCIUM CHLORIDE: 600; 310; 30; 20 INJECTION, SOLUTION INTRAVENOUS at 17:44:00

## 2020-01-01 RX ADMIN — LIDOCAINE HYDROCHLORIDE 25 MG: 10 INJECTION, SOLUTION EPIDURAL; INFILTRATION; INTRACAUDAL; PERINEURAL at 13:00:00

## 2020-01-01 RX ADMIN — SODIUM CHLORIDE, SODIUM LACTATE, POTASSIUM CHLORIDE, CALCIUM CHLORIDE: 600; 310; 30; 20 INJECTION, SOLUTION INTRAVENOUS at 15:58:00

## 2020-01-01 RX ADMIN — MIDAZOLAM HYDROCHLORIDE 4 MG: 1 INJECTION INTRAMUSCULAR; INTRAVENOUS at 10:43:00

## 2020-01-01 RX ADMIN — LIDOCAINE HYDROCHLORIDE 50 MG: 10 INJECTION, SOLUTION EPIDURAL; INFILTRATION; INTRACAUDAL; PERINEURAL at 10:43:00

## 2020-01-01 RX ADMIN — KETOROLAC TROMETHAMINE 30 MG: 30 INJECTION, SOLUTION INTRAMUSCULAR; INTRAVENOUS at 15:45:00

## 2020-01-01 RX ADMIN — SODIUM CHLORIDE: 9 INJECTION, SOLUTION INTRAVENOUS at 13:18:00

## 2020-01-01 RX ADMIN — LIDOCAINE HYDROCHLORIDE 25 MG: 10 INJECTION, SOLUTION EPIDURAL; INFILTRATION; INTRACAUDAL; PERINEURAL at 14:04:00

## 2020-01-01 RX ADMIN — SODIUM CHLORIDE, SODIUM LACTATE, POTASSIUM CHLORIDE, CALCIUM CHLORIDE: 600; 310; 30; 20 INJECTION, SOLUTION INTRAVENOUS at 14:12:00

## 2020-01-01 RX ADMIN — METOCLOPRAMIDE HYDROCHLORIDE 20 MG: 5 INJECTION INTRAMUSCULAR; INTRAVENOUS at 10:43:00

## 2020-01-01 RX ADMIN — ONDANSETRON 4 MG: 2 INJECTION INTRAMUSCULAR; INTRAVENOUS at 18:19:00

## 2020-01-01 RX ADMIN — MIDAZOLAM HYDROCHLORIDE 2 MG: 1 INJECTION INTRAMUSCULAR; INTRAVENOUS at 17:49:00

## 2020-01-01 RX ADMIN — EPHEDRINE SULFATE 5 MG: 50 INJECTION, SOLUTION INTRAVENOUS at 14:29:00

## 2020-01-03 ENCOUNTER — OFFICE VISIT (OUTPATIENT)
Dept: HEMATOLOGY/ONCOLOGY | Age: 60
End: 2020-01-03
Attending: INTERNAL MEDICINE
Payer: COMMERCIAL

## 2020-01-03 VITALS
OXYGEN SATURATION: 98 % | SYSTOLIC BLOOD PRESSURE: 124 MMHG | HEIGHT: 72.05 IN | RESPIRATION RATE: 20 BRPM | BODY MASS INDEX: 32.19 KG/M2 | WEIGHT: 237.63 LBS | DIASTOLIC BLOOD PRESSURE: 60 MMHG | TEMPERATURE: 98 F | HEART RATE: 65 BPM

## 2020-01-03 DIAGNOSIS — M53.3 SACROCOCCYGEAL PAIN: ICD-10-CM

## 2020-01-03 DIAGNOSIS — C22.0 HEPATOCELLULAR CARCINOMA (HCC): Primary | ICD-10-CM

## 2020-01-03 DIAGNOSIS — D69.6 THROMBOCYTOPENIA (HCC): ICD-10-CM

## 2020-01-03 DIAGNOSIS — D61.818 PANCYTOPENIA (HCC): ICD-10-CM

## 2020-01-03 DIAGNOSIS — C34.91 PRIMARY LUNG SQUAMOUS CELL CARCINOMA, RIGHT (HCC): ICD-10-CM

## 2020-01-03 DIAGNOSIS — K74.60 CHRONIC HEPATITIS C WITH CIRRHOSIS (HCC): ICD-10-CM

## 2020-01-03 DIAGNOSIS — B18.2 CHRONIC HEPATITIS C WITH CIRRHOSIS (HCC): ICD-10-CM

## 2020-01-03 DIAGNOSIS — G47.09 OTHER INSOMNIA: ICD-10-CM

## 2020-01-03 DIAGNOSIS — F10.11 ALCOHOL ABUSE, IN REMISSION: ICD-10-CM

## 2020-01-03 DIAGNOSIS — R60.0 BILATERAL LOWER EXTREMITY EDEMA: ICD-10-CM

## 2020-01-03 DIAGNOSIS — F17.210 CIGARETTE SMOKER: ICD-10-CM

## 2020-01-03 LAB
AFP-TM SERPL-MCNC: 19.9 NG/ML (ref ?–8)
ALBUMIN SERPL-MCNC: 2.9 G/DL (ref 3.4–5)
ALBUMIN/GLOB SERPL: 0.7 {RATIO} (ref 1–2)
ALP LIVER SERPL-CCNC: 194 U/L (ref 45–117)
ALT SERPL-CCNC: 34 U/L (ref 16–61)
ANION GAP SERPL CALC-SCNC: 7 MMOL/L (ref 0–18)
AST SERPL-CCNC: 48 U/L (ref 15–37)
BASOPHILS # BLD AUTO: 0.01 X10(3) UL (ref 0–0.2)
BASOPHILS NFR BLD AUTO: 0.6 %
BILIRUB SERPL-MCNC: 2.7 MG/DL (ref 0.1–2)
BUN BLD-MCNC: 11 MG/DL (ref 7–18)
BUN/CREAT SERPL: 15.3 (ref 10–20)
CALCIUM BLD-MCNC: 8.2 MG/DL (ref 8.5–10.1)
CHLORIDE SERPL-SCNC: 107 MMOL/L (ref 98–112)
CO2 SERPL-SCNC: 24 MMOL/L (ref 21–32)
CREAT BLD-MCNC: 0.72 MG/DL (ref 0.7–1.3)
DEPRECATED RDW RBC AUTO: 52.2 FL (ref 35.1–46.3)
EOSINOPHIL # BLD AUTO: 0.07 X10(3) UL (ref 0–0.7)
EOSINOPHIL NFR BLD AUTO: 4.2 %
ERYTHROCYTE [DISTWIDTH] IN BLOOD BY AUTOMATED COUNT: 14.1 % (ref 11–15)
GLOBULIN PLAS-MCNC: 4 G/DL (ref 2.8–4.4)
GLUCOSE BLD-MCNC: 153 MG/DL (ref 70–99)
HCT VFR BLD AUTO: 34.8 % (ref 39–53)
HGB BLD-MCNC: 12.2 G/DL (ref 13–17.5)
IMM GRANULOCYTES # BLD AUTO: 0 X10(3) UL (ref 0–1)
IMM GRANULOCYTES NFR BLD: 0 %
INR BLD: 1.47 (ref 0.9–1.1)
LYMPHOCYTES # BLD AUTO: 0.51 X10(3) UL (ref 1–4)
LYMPHOCYTES NFR BLD AUTO: 30.7 %
M PROTEIN MFR SERPL ELPH: 6.9 G/DL (ref 6.4–8.2)
MCH RBC QN AUTO: 35.5 PG (ref 26–34)
MCHC RBC AUTO-ENTMCNC: 35.1 G/DL (ref 31–37)
MCV RBC AUTO: 101.2 FL (ref 80–100)
MONOCYTES # BLD AUTO: 0.22 X10(3) UL (ref 0.1–1)
MONOCYTES NFR BLD AUTO: 13.3 %
NEUTROPHILS # BLD AUTO: 0.85 X10 (3) UL (ref 1.5–7.7)
NEUTROPHILS # BLD AUTO: 0.85 X10(3) UL (ref 1.5–7.7)
NEUTROPHILS NFR BLD AUTO: 51.2 %
OSMOLALITY SERPL CALC.SUM OF ELEC: 288 MOSM/KG (ref 275–295)
PATIENT FASTING Y/N/NP: NO
PLATELET # BLD AUTO: 39 10(3)UL (ref 150–450)
POTASSIUM SERPL-SCNC: 4.2 MMOL/L (ref 3.5–5.1)
PSA SERPL DL<=0.01 NG/ML-MCNC: 18.2 SECONDS (ref 12.2–14.4)
RBC # BLD AUTO: 3.44 X10(6)UL (ref 4.3–5.7)
SODIUM SERPL-SCNC: 138 MMOL/L (ref 136–145)
WBC # BLD AUTO: 1.7 X10(3) UL (ref 4–11)

## 2020-01-03 PROCEDURE — 85025 COMPLETE CBC W/AUTO DIFF WBC: CPT

## 2020-01-03 PROCEDURE — 82105 ALPHA-FETOPROTEIN SERUM: CPT

## 2020-01-03 PROCEDURE — 36415 COLL VENOUS BLD VENIPUNCTURE: CPT

## 2020-01-03 PROCEDURE — 85610 PROTHROMBIN TIME: CPT

## 2020-01-03 PROCEDURE — 96413 CHEMO IV INFUSION 1 HR: CPT

## 2020-01-03 PROCEDURE — 80053 COMPREHEN METABOLIC PANEL: CPT

## 2020-01-03 PROCEDURE — 99215 OFFICE O/P EST HI 40 MIN: CPT | Performed by: INTERNAL MEDICINE

## 2020-01-03 NOTE — PROGRESS NOTES
Pt here for C20D1.   Arrives Ambulating independently, accompanied by Self           Patient reports possible pregnancy since last therapy cycle: Not Applicable    Modifications in dose or schedule: No - okay to treat with low CBC cts per MD.      Frequency

## 2020-01-03 NOTE — PROGRESS NOTES
Hematology/Oncology Clinic Follow Up Visit    Patient Name: Haleigh Birmingham Record Number: [id number]    YOB: 1960    PCP: Dr. Ruth Fernandes   Other providers: Dr. Corin Chen (liver)    Reason for Consultation:  Obed mendez nivolumab  -6/7/19- cycle 6 nivolumab  -6/21/19- cycle 7 nivolumab  -6/28/19- CT c/a/p- Significant decrease in the size of the RUL lobe spiculated lung mass (21 x 23mm -> 11 x 14mm).   Decrease in size of heterogeneously enhancing mass in segment 5 of the absent and not a problem lately. His left heel pain is ongoing but not bothersome enough to where he wants to see the podiatrist again yet. He is sleeping better over the last couple weeks, takes melatonin and Lunesta 3 mg each night before bed.   Still s tablet (12.5 mg total) by mouth 2 (two) times daily with meals. , Disp: 180 tablet, Rfl: 3  folic acid 1 MG Oral Tab, Take 1 tablet (1 mg total) by mouth daily. , Disp: 90 tablet, Rfl: 3  furosemide 40 MG Oral Tab, Take 1 tablet (40 mg total) by mouth 2 (two lb)  12/06/19 : 105.2 kg (232 lb)  11/22/19 : 104.9 kg (231 lb 3.2 oz)  11/08/19 : 104.8 kg (231 lb)  10/25/19 : 102.6 kg (226 lb 3.2 oz)    ECOG PS: 1    Physical Examination:  General: Patient is alert and oriented, not in acute distress  Psych: Mood and AFPTM 17.3 (H) 11/22/2019    AFPTM 17.3 (H) 11/08/2019    AFPTM 18.7 (H) 10/25/2019    AFPTM 17.2 (H) 10/11/2019    AFPTM 16.2 (H) 09/27/2019    AFPTM 18.0 (H) 09/13/2019    AFPTM 18.2 (H) 08/30/2019    AFPTM 17.7 (H) 08/16/2019    AFPTM 16.8 (H) 07/19/201 08/16/2019    TSH 2.150 06/21/2019    TSH 1.410 05/10/2019    TSH 3.590 03/27/2019    TSH 2.020 12/04/2018    TSH 1.560 10/25/2016    TSH 1.500 07/19/2014    TSH 1.35 06/29/2013     Lab Results   Component Value Date    T4F 1.0 12/04/2018    T4F 1.1 10/25/ malignancy, folate def  -continue 1mg folic acid daily. -stable today  -CBC w/ diff in 2 weeks    *constipation  -better. Not needing to take anything for this lately. *smoking  -will continue to limit smoking.   I have previously encouraged his wife

## 2020-01-12 ENCOUNTER — HOSPITAL ENCOUNTER (OUTPATIENT)
Dept: MRI IMAGING | Age: 60
Discharge: HOME | End: 2020-01-12
Attending: INTERNAL MEDICINE
Payer: COMMERCIAL

## 2020-01-12 DIAGNOSIS — C22.0 HEPATOCELLULAR CARCINOMA (HCC): ICD-10-CM

## 2020-01-12 DIAGNOSIS — C34.91 PRIMARY LUNG SQUAMOUS CELL CARCINOMA, RIGHT (HCC): ICD-10-CM

## 2020-01-12 PROCEDURE — 74183 MRI ABD W/O CNTR FLWD CNTR: CPT | Performed by: INTERNAL MEDICINE

## 2020-01-12 PROCEDURE — A9581 GADOXETATE DISODIUM INJ: HCPCS | Performed by: INTERNAL MEDICINE

## 2020-01-13 ENCOUNTER — HOSPITAL ENCOUNTER (OUTPATIENT)
Dept: CT IMAGING | Facility: HOSPITAL | Age: 60
Discharge: HOME | End: 2020-01-13
Attending: INTERNAL MEDICINE
Payer: COMMERCIAL

## 2020-01-13 DIAGNOSIS — C22.0 HEPATOCELLULAR CARCINOMA (HCC): ICD-10-CM

## 2020-01-13 DIAGNOSIS — C34.91 PRIMARY LUNG SQUAMOUS CELL CARCINOMA, RIGHT (HCC): ICD-10-CM

## 2020-01-13 PROCEDURE — 71260 CT THORAX DX C+: CPT | Performed by: INTERNAL MEDICINE

## 2020-01-17 ENCOUNTER — OFFICE VISIT (OUTPATIENT)
Dept: HEMATOLOGY/ONCOLOGY | Age: 60
End: 2020-01-17
Attending: INTERNAL MEDICINE
Payer: COMMERCIAL

## 2020-01-17 VITALS
OXYGEN SATURATION: 98 % | BODY MASS INDEX: 32.32 KG/M2 | TEMPERATURE: 98 F | HEIGHT: 72.05 IN | RESPIRATION RATE: 20 BRPM | DIASTOLIC BLOOD PRESSURE: 74 MMHG | HEART RATE: 63 BPM | WEIGHT: 238.63 LBS | SYSTOLIC BLOOD PRESSURE: 121 MMHG

## 2020-01-17 DIAGNOSIS — G47.09 OTHER INSOMNIA: ICD-10-CM

## 2020-01-17 DIAGNOSIS — F10.11 ALCOHOL ABUSE, IN REMISSION: ICD-10-CM

## 2020-01-17 DIAGNOSIS — D69.6 THROMBOCYTOPENIA (HCC): ICD-10-CM

## 2020-01-17 DIAGNOSIS — K74.60 CHRONIC HEPATITIS C WITH CIRRHOSIS (HCC): ICD-10-CM

## 2020-01-17 DIAGNOSIS — C22.0 HEPATOCELLULAR CARCINOMA (HCC): Primary | ICD-10-CM

## 2020-01-17 DIAGNOSIS — D70.9 NEUTROPENIA, UNSPECIFIED TYPE (HCC): ICD-10-CM

## 2020-01-17 DIAGNOSIS — B18.2 CHRONIC HEPATITIS C WITH CIRRHOSIS (HCC): ICD-10-CM

## 2020-01-17 DIAGNOSIS — M53.3 COCCYDYNIA: ICD-10-CM

## 2020-01-17 DIAGNOSIS — F17.210 CIGARETTE SMOKER: ICD-10-CM

## 2020-01-17 DIAGNOSIS — C34.91 PRIMARY LUNG SQUAMOUS CELL CARCINOMA, RIGHT (HCC): ICD-10-CM

## 2020-01-17 LAB
AFP-TM SERPL-MCNC: 19.7 NG/ML (ref ?–8)
ALBUMIN SERPL-MCNC: 2.7 G/DL (ref 3.4–5)
ALBUMIN/GLOB SERPL: 0.7 {RATIO} (ref 1–2)
ALP LIVER SERPL-CCNC: 180 U/L (ref 45–117)
ALT SERPL-CCNC: 37 U/L (ref 16–61)
ANION GAP SERPL CALC-SCNC: 7 MMOL/L (ref 0–18)
AST SERPL-CCNC: 52 U/L (ref 15–37)
BASOPHILS # BLD AUTO: 0.01 X10(3) UL (ref 0–0.2)
BASOPHILS NFR BLD AUTO: 0.5 %
BILIRUB SERPL-MCNC: 2.7 MG/DL (ref 0.1–2)
BUN BLD-MCNC: 9 MG/DL (ref 7–18)
BUN/CREAT SERPL: 12.7 (ref 10–20)
CALCIUM BLD-MCNC: 7.8 MG/DL (ref 8.5–10.1)
CHLORIDE SERPL-SCNC: 106 MMOL/L (ref 98–112)
CO2 SERPL-SCNC: 24 MMOL/L (ref 21–32)
CREAT BLD-MCNC: 0.71 MG/DL (ref 0.7–1.3)
DEPRECATED RDW RBC AUTO: 52.3 FL (ref 35.1–46.3)
EOSINOPHIL # BLD AUTO: 0.05 X10(3) UL (ref 0–0.7)
EOSINOPHIL NFR BLD AUTO: 2.6 %
ERYTHROCYTE [DISTWIDTH] IN BLOOD BY AUTOMATED COUNT: 14.3 % (ref 11–15)
GLOBULIN PLAS-MCNC: 3.9 G/DL (ref 2.8–4.4)
GLUCOSE BLD-MCNC: 207 MG/DL (ref 70–99)
HCT VFR BLD AUTO: 35.2 % (ref 39–53)
HGB BLD-MCNC: 12.1 G/DL (ref 13–17.5)
IMM GRANULOCYTES # BLD AUTO: 0.01 X10(3) UL (ref 0–1)
IMM GRANULOCYTES NFR BLD: 0.5 %
INR BLD: 1.46 (ref 0.9–1.1)
LYMPHOCYTES # BLD AUTO: 0.43 X10(3) UL (ref 1–4)
LYMPHOCYTES NFR BLD AUTO: 22.4 %
M PROTEIN MFR SERPL ELPH: 6.6 G/DL (ref 6.4–8.2)
MCH RBC QN AUTO: 34.7 PG (ref 26–34)
MCHC RBC AUTO-ENTMCNC: 34.4 G/DL (ref 31–37)
MCV RBC AUTO: 100.9 FL (ref 80–100)
MONOCYTES # BLD AUTO: 0.19 X10(3) UL (ref 0.1–1)
MONOCYTES NFR BLD AUTO: 9.9 %
NEUTROPHILS # BLD AUTO: 1.23 X10 (3) UL (ref 1.5–7.7)
NEUTROPHILS # BLD AUTO: 1.23 X10(3) UL (ref 1.5–7.7)
NEUTROPHILS NFR BLD AUTO: 64.1 %
OSMOLALITY SERPL CALC.SUM OF ELEC: 289 MOSM/KG (ref 275–295)
PATIENT FASTING Y/N/NP: NO
PLATELET # BLD AUTO: 42 10(3)UL (ref 150–450)
POTASSIUM SERPL-SCNC: 4.1 MMOL/L (ref 3.5–5.1)
PSA SERPL DL<=0.01 NG/ML-MCNC: 18.1 SECONDS (ref 12.2–14.4)
RBC # BLD AUTO: 3.49 X10(6)UL (ref 4.3–5.7)
SODIUM SERPL-SCNC: 137 MMOL/L (ref 136–145)
WBC # BLD AUTO: 1.9 X10(3) UL (ref 4–11)

## 2020-01-17 PROCEDURE — 99215 OFFICE O/P EST HI 40 MIN: CPT | Performed by: INTERNAL MEDICINE

## 2020-01-17 PROCEDURE — 96413 CHEMO IV INFUSION 1 HR: CPT

## 2020-01-17 NOTE — PROGRESS NOTES
Hematology/Oncology Clinic Follow Up Visit    Patient Name: Haleigh Birmingham Record Number: [id number]    YOB: 1960    PCP: Dr. Vanna Oneill   Other providers: Dr. López Galeano (liver)    Reason for Consultation:  Dominique mendez nivolumab  -6/7/19- cycle 6 nivolumab  -6/21/19- cycle 7 nivolumab  -6/28/19- CT c/a/p- Significant decrease in the size of the RUL lobe spiculated lung mass (21 x 23mm -> 11 x 14mm).   Decrease in size of heterogeneously enhancing mass in segment 5 of the significant radiographic differences noted despite dramatic response based on AFP levels and clear clinical improvement, therefore consensus is that his malignancy is not evaluable by imaging**  -1/13/20- CT chest- Relatively stable spiculated opacity abiola days but denies any increased dyspnea, cough, sore throat, or earache. He remains physically active. Energy is good, his mood is fine. Been taking spironolactone every other day for the last 2 weeks, feels his abdominal distention is about the same. Penicillins             HIVES    Psychosocial History:  Social History    Patient does not qualify to have social determinant information on file (likely too young). Social History Narrative      , lives with his wife. No children.   Works as bilaterally  GI/Abd: Nontender, mild ascites. Normal bowel sounds. No hepatosplenomegaly  Neurological: Grossly intact    Lymphatics: No palpable lymphadenopathy  Skin: No rashes or petechiae  Ext: mild symmetric BLE edema, +large varicose veins present. AFPTM 442.5 (H) 05/10/2019    AFPTM 2,006.4 (H) 04/26/2019    AFPTM 9,617.9 (H) 04/12/2019    AFPTM 30,440.0 (H) 03/27/2019    AFPTM 28,614.5 (H) 03/16/2019    AFPTM 3,785.7 (H) 01/21/2019     Lab Results   Component Value Date    REITCPERCENT 2.0 06/07 unresectable  -have started first line nivolumab with palliative intent on 3/28/19 given simultaneous diagnosis of both liver and lung cancers and poor candidate for TKIs given underlying liver dysfunction.  Has had an excellent response to therapy thus far exercise just before bed. Avoid TV in bedroom. *gynecomastia/breast tenderness  -likely related to cirrhosis and spironolactone. Better since reduced frequency of the spironolactone      *coccydynia  -Worse again.   Advised to try seating cushions, we

## 2020-01-17 NOTE — PROGRESS NOTES
Pt here for C21D1.   Arrives Ambulating independently, accompanied by Self           Patient reports possible pregnancy since last therapy cycle: Not Applicable    Modifications in dose or schedule: No     Frequency of blood return and site check throughout

## 2020-01-31 ENCOUNTER — OFFICE VISIT (OUTPATIENT)
Dept: HEMATOLOGY/ONCOLOGY | Age: 60
End: 2020-01-31
Attending: INTERNAL MEDICINE
Payer: COMMERCIAL

## 2020-01-31 VITALS
TEMPERATURE: 98 F | BODY MASS INDEX: 32.34 KG/M2 | DIASTOLIC BLOOD PRESSURE: 70 MMHG | HEART RATE: 64 BPM | OXYGEN SATURATION: 97 % | RESPIRATION RATE: 20 BRPM | WEIGHT: 238.81 LBS | SYSTOLIC BLOOD PRESSURE: 109 MMHG | HEIGHT: 72.05 IN

## 2020-01-31 DIAGNOSIS — C22.0 HEPATOCELLULAR CARCINOMA (HCC): Primary | ICD-10-CM

## 2020-01-31 DIAGNOSIS — F17.210 CIGARETTE SMOKER: ICD-10-CM

## 2020-01-31 DIAGNOSIS — C34.91 PRIMARY LUNG SQUAMOUS CELL CARCINOMA, RIGHT (HCC): ICD-10-CM

## 2020-01-31 DIAGNOSIS — F10.11 ALCOHOL ABUSE, IN REMISSION: ICD-10-CM

## 2020-01-31 DIAGNOSIS — R60.0 BILATERAL LOWER EXTREMITY EDEMA: ICD-10-CM

## 2020-01-31 DIAGNOSIS — K74.60 CHRONIC HEPATITIS C WITH CIRRHOSIS (HCC): ICD-10-CM

## 2020-01-31 DIAGNOSIS — M53.3 COCCYDYNIA: ICD-10-CM

## 2020-01-31 DIAGNOSIS — B18.2 CHRONIC HEPATITIS C WITH CIRRHOSIS (HCC): ICD-10-CM

## 2020-01-31 DIAGNOSIS — D70.9 NEUTROPENIA, UNSPECIFIED TYPE (HCC): ICD-10-CM

## 2020-01-31 DIAGNOSIS — D69.6 THROMBOCYTOPENIA (HCC): ICD-10-CM

## 2020-01-31 DIAGNOSIS — G47.09 OTHER INSOMNIA: ICD-10-CM

## 2020-01-31 LAB
AFP-TM SERPL-MCNC: 19.9 NG/ML (ref ?–8)
ALBUMIN SERPL-MCNC: 2.8 G/DL (ref 3.4–5)
ALBUMIN/GLOB SERPL: 0.7 {RATIO} (ref 1–2)
ALP LIVER SERPL-CCNC: 198 U/L (ref 45–117)
ALT SERPL-CCNC: 36 U/L (ref 16–61)
ANION GAP SERPL CALC-SCNC: 5 MMOL/L (ref 0–18)
AST SERPL-CCNC: 51 U/L (ref 15–37)
BASOPHILS # BLD AUTO: 0.02 X10(3) UL (ref 0–0.2)
BASOPHILS NFR BLD AUTO: 1.2 %
BILIRUB SERPL-MCNC: 1.7 MG/DL (ref 0.1–2)
BUN BLD-MCNC: 11 MG/DL (ref 7–18)
BUN/CREAT SERPL: 17.7 (ref 10–20)
CALCIUM BLD-MCNC: 8.5 MG/DL (ref 8.5–10.1)
CHLORIDE SERPL-SCNC: 106 MMOL/L (ref 98–112)
CO2 SERPL-SCNC: 24 MMOL/L (ref 21–32)
CREAT BLD-MCNC: 0.62 MG/DL (ref 0.7–1.3)
DEPRECATED RDW RBC AUTO: 51.6 FL (ref 35.1–46.3)
EOSINOPHIL # BLD AUTO: 0.09 X10(3) UL (ref 0–0.7)
EOSINOPHIL NFR BLD AUTO: 5.6 %
ERYTHROCYTE [DISTWIDTH] IN BLOOD BY AUTOMATED COUNT: 14 % (ref 11–15)
GLOBULIN PLAS-MCNC: 4 G/DL (ref 2.8–4.4)
GLUCOSE BLD-MCNC: 181 MG/DL (ref 70–99)
HCT VFR BLD AUTO: 34.8 % (ref 39–53)
HGB BLD-MCNC: 12.1 G/DL (ref 13–17.5)
IMM GRANULOCYTES # BLD AUTO: 0.02 X10(3) UL (ref 0–1)
IMM GRANULOCYTES NFR BLD: 1.2 %
INR BLD: 1.48 (ref 0.9–1.1)
LYMPHOCYTES # BLD AUTO: 0.49 X10(3) UL (ref 1–4)
LYMPHOCYTES NFR BLD AUTO: 30.4 %
M PROTEIN MFR SERPL ELPH: 6.8 G/DL (ref 6.4–8.2)
MCH RBC QN AUTO: 34.9 PG (ref 26–34)
MCHC RBC AUTO-ENTMCNC: 34.8 G/DL (ref 31–37)
MCV RBC AUTO: 100.3 FL (ref 80–100)
MONOCYTES # BLD AUTO: 0.2 X10(3) UL (ref 0.1–1)
MONOCYTES NFR BLD AUTO: 12.4 %
NEUTROPHILS # BLD AUTO: 0.79 X10 (3) UL (ref 1.5–7.7)
NEUTROPHILS # BLD AUTO: 0.79 X10(3) UL (ref 1.5–7.7)
NEUTROPHILS NFR BLD AUTO: 49.2 %
OSMOLALITY SERPL CALC.SUM OF ELEC: 284 MOSM/KG (ref 275–295)
PATIENT FASTING Y/N/NP: NO
PLATELET # BLD AUTO: 48 10(3)UL (ref 150–450)
POTASSIUM SERPL-SCNC: 4.3 MMOL/L (ref 3.5–5.1)
PSA SERPL DL<=0.01 NG/ML-MCNC: 18.3 SECONDS (ref 12.2–14.4)
RBC # BLD AUTO: 3.47 X10(6)UL (ref 4.3–5.7)
SODIUM SERPL-SCNC: 135 MMOL/L (ref 136–145)
TSI SER-ACNC: 1.65 MIU/ML (ref 0.36–3.74)
WBC # BLD AUTO: 1.6 X10(3) UL (ref 4–11)

## 2020-01-31 PROCEDURE — 96413 CHEMO IV INFUSION 1 HR: CPT

## 2020-01-31 PROCEDURE — 99215 OFFICE O/P EST HI 40 MIN: CPT | Performed by: INTERNAL MEDICINE

## 2020-01-31 NOTE — PROGRESS NOTES
Pt here for C22D1.   Arrives Ambulating independently, accompanied by Self         Modifications in dose or schedule: No     Frequency of blood return and site check throughout administration: Prior to administration   Discharged to Home, Ambulating indepen

## 2020-01-31 NOTE — PROGRESS NOTES
Hematology/Oncology Clinic Follow Up Visit    Patient Name: Haleigh Birmingham Record Number: [id number]    YOB: 1960    PCP: Dr. Shalonda Jacinto   Other providers: Dr. Elizabeth Mullins (liver)    Reason for Consultation:  Az mendez nivolumab  -6/7/19- cycle 6 nivolumab  -6/21/19- cycle 7 nivolumab  -6/28/19- CT c/a/p- Significant decrease in the size of the RUL lobe spiculated lung mass (21 x 23mm -> 11 x 14mm).   Decrease in size of heterogeneously enhancing mass in segment 5 of the significant radiographic differences noted despite dramatic response based on AFP levels and clear clinical improvement, therefore consensus is that his malignancy is not evaluable by imaging**  -1/13/20- CT chest- Relatively stable spiculated opacity abiola is fine. Been taking spironolactone every other day lately, feels his abdominal distention is perhaps a bit improved. He continues to take Lasix daily. He denies any nausea or vomiting.      Past Medical History:  Past Medical History:   Diagnosis Date on file (likely too young). Social History Narrative      , lives with his wife. No children. Works as a technician specialist- fixes machines, HVAC and plumbing, maintenance.      Social History    Tobacco Use      Smoking status: Former Smoker lymphadenopathy  Skin: No rashes or petechiae  Ext: mild symmetric BLE edema, +large varicose veins present.     Laboratory:  Lab Results   Component Value Date    WBC 1.6 (L) 01/31/2020    WBC 1.9 (L) 01/17/2020    WBC 1.7 (L) 01/03/2020    HGB 12.1 (L) 01 03/27/2019    AFPTM 28,614.5 (H) 03/16/2019    AFPTM 3,785.7 (H) 01/21/2019     Lab Results   Component Value Date    REITCPERCENT 2.0 06/07/2019    REITCPERCENT 2.0 12/17/2018     Lab Results   Component Value Date    RETHE 41.8 (H) 06/07/2019    RETHE 40 simultaneous diagnosis of both liver and lung cancers and poor candidate for TKIs given underlying liver dysfunction. Has had an excellent response to therapy thus far.   Unfortunately his liver malignancy is not reliably evaluated by any form of imaging- C bed.  Avoid TV in bedroom. *gynecomastia/breast tenderness  -likely related to cirrhosis and spironolactone. Better since reduced frequency of the spironolactone      *coccydynia  -A little better lately.   I still encouraged him to try seating cushion

## 2020-02-21 ENCOUNTER — TELEPHONE (OUTPATIENT)
Dept: HEMATOLOGY/ONCOLOGY | Facility: HOSPITAL | Age: 60
End: 2020-02-21

## 2020-02-21 NOTE — TELEPHONE ENCOUNTER
Pt called he has been having shooting pain in his 'liver.' has been happening occasionally the past 3-4 days. Pt calling because he was told to call with any problems or anything new.  Pt spoke with MD directly, instructed to call if worsening, bowel change

## 2020-02-28 ENCOUNTER — OFFICE VISIT (OUTPATIENT)
Dept: HEMATOLOGY/ONCOLOGY | Age: 60
End: 2020-02-28
Attending: INTERNAL MEDICINE
Payer: COMMERCIAL

## 2020-02-28 VITALS
WEIGHT: 232 LBS | DIASTOLIC BLOOD PRESSURE: 87 MMHG | HEIGHT: 72.05 IN | HEART RATE: 62 BPM | SYSTOLIC BLOOD PRESSURE: 131 MMHG | OXYGEN SATURATION: 98 % | TEMPERATURE: 97 F | RESPIRATION RATE: 20 BRPM | BODY MASS INDEX: 31.42 KG/M2

## 2020-02-28 DIAGNOSIS — C22.0 HEPATOCELLULAR CARCINOMA (HCC): Primary | ICD-10-CM

## 2020-02-28 DIAGNOSIS — D69.6 THROMBOCYTOPENIA (HCC): ICD-10-CM

## 2020-02-28 DIAGNOSIS — F17.210 CIGARETTE SMOKER: ICD-10-CM

## 2020-02-28 DIAGNOSIS — G47.09 OTHER INSOMNIA: ICD-10-CM

## 2020-02-28 DIAGNOSIS — M53.3 COCCYDYNIA: ICD-10-CM

## 2020-02-28 DIAGNOSIS — C34.91 PRIMARY LUNG SQUAMOUS CELL CARCINOMA, RIGHT (HCC): ICD-10-CM

## 2020-02-28 DIAGNOSIS — F10.11 ALCOHOL ABUSE, IN REMISSION: ICD-10-CM

## 2020-02-28 DIAGNOSIS — B18.2 CHRONIC HEPATITIS C WITH CIRRHOSIS (HCC): ICD-10-CM

## 2020-02-28 DIAGNOSIS — R79.89 ELEVATED LIVER FUNCTION TESTS: ICD-10-CM

## 2020-02-28 DIAGNOSIS — K74.60 CHRONIC HEPATITIS C WITH CIRRHOSIS (HCC): ICD-10-CM

## 2020-02-28 DIAGNOSIS — R10.11 RUQ ABDOMINAL PAIN: ICD-10-CM

## 2020-02-28 DIAGNOSIS — D61.818 PANCYTOPENIA (HCC): ICD-10-CM

## 2020-02-28 LAB
AFP-TM SERPL-MCNC: 22.9 NG/ML (ref ?–8)
ALBUMIN SERPL-MCNC: 2.8 G/DL (ref 3.4–5)
ALBUMIN/GLOB SERPL: 0.7 {RATIO} (ref 1–2)
ALP LIVER SERPL-CCNC: 193 U/L (ref 45–117)
ALT SERPL-CCNC: 43 U/L (ref 16–61)
ANION GAP SERPL CALC-SCNC: 4 MMOL/L (ref 0–18)
AST SERPL-CCNC: 51 U/L (ref 15–37)
BASOPHILS # BLD AUTO: 0.01 X10(3) UL (ref 0–0.2)
BASOPHILS NFR BLD AUTO: 0.5 %
BILIRUB SERPL-MCNC: 1.7 MG/DL (ref 0.1–2)
BUN BLD-MCNC: 13 MG/DL (ref 7–18)
BUN/CREAT SERPL: 18.6 (ref 10–20)
CALCIUM BLD-MCNC: 8.7 MG/DL (ref 8.5–10.1)
CHLORIDE SERPL-SCNC: 108 MMOL/L (ref 98–112)
CO2 SERPL-SCNC: 24 MMOL/L (ref 21–32)
CREAT BLD-MCNC: 0.7 MG/DL (ref 0.7–1.3)
DEPRECATED RDW RBC AUTO: 50.5 FL (ref 35.1–46.3)
EOSINOPHIL # BLD AUTO: 0.12 X10(3) UL (ref 0–0.7)
EOSINOPHIL NFR BLD AUTO: 6.4 %
ERYTHROCYTE [DISTWIDTH] IN BLOOD BY AUTOMATED COUNT: 13.6 % (ref 11–15)
GLOBULIN PLAS-MCNC: 4 G/DL (ref 2.8–4.4)
GLUCOSE BLD-MCNC: 194 MG/DL (ref 70–99)
HCT VFR BLD AUTO: 37.5 % (ref 39–53)
HGB BLD-MCNC: 13.4 G/DL (ref 13–17.5)
IMM GRANULOCYTES # BLD AUTO: 0.01 X10(3) UL (ref 0–1)
IMM GRANULOCYTES NFR BLD: 0.5 %
INR BLD: 1.42 (ref 0.9–1.1)
LYMPHOCYTES # BLD AUTO: 0.56 X10(3) UL (ref 1–4)
LYMPHOCYTES NFR BLD AUTO: 29.9 %
M PROTEIN MFR SERPL ELPH: 6.8 G/DL (ref 6.4–8.2)
MCH RBC QN AUTO: 35.8 PG (ref 26–34)
MCHC RBC AUTO-ENTMCNC: 35.7 G/DL (ref 31–37)
MCV RBC AUTO: 100.3 FL (ref 80–100)
MONOCYTES # BLD AUTO: 0.18 X10(3) UL (ref 0.1–1)
MONOCYTES NFR BLD AUTO: 9.6 %
NEUTROPHILS # BLD AUTO: 0.99 X10 (3) UL (ref 1.5–7.7)
NEUTROPHILS # BLD AUTO: 0.99 X10(3) UL (ref 1.5–7.7)
NEUTROPHILS NFR BLD AUTO: 53.1 %
OSMOLALITY SERPL CALC.SUM OF ELEC: 287 MOSM/KG (ref 275–295)
PATIENT FASTING Y/N/NP: NO
PLATELET # BLD AUTO: 44 10(3)UL (ref 150–450)
POTASSIUM SERPL-SCNC: 4.1 MMOL/L (ref 3.5–5.1)
PSA SERPL DL<=0.01 NG/ML-MCNC: 17.7 SECONDS (ref 12.2–14.4)
RBC # BLD AUTO: 3.74 X10(6)UL (ref 4.3–5.7)
SODIUM SERPL-SCNC: 136 MMOL/L (ref 136–145)
WBC # BLD AUTO: 1.9 X10(3) UL (ref 4–11)

## 2020-02-28 PROCEDURE — 99215 OFFICE O/P EST HI 40 MIN: CPT | Performed by: INTERNAL MEDICINE

## 2020-02-28 PROCEDURE — 96413 CHEMO IV INFUSION 1 HR: CPT

## 2020-02-28 NOTE — PROGRESS NOTES
Hematology/Oncology Clinic Follow Up Visit    Patient Name: Haleigh Birmingham Record Number: [id number]    YOB: 1960    PCP: Dr. Adeline Medrano   Other providers: Dr. Tapia Poster (liver)    Reason for Consultation:  Gerry mendez nivolumab  -6/7/19- cycle 6 nivolumab  -6/21/19- cycle 7 nivolumab  -6/28/19- CT c/a/p- Significant decrease in the size of the RUL lobe spiculated lung mass (21 x 23mm -> 11 x 14mm).   Decrease in size of heterogeneously enhancing mass in segment 5 of the significant radiographic differences noted despite dramatic response based on AFP levels and clear clinical improvement, therefore consensus is that his malignancy is not evaluable by imaging**  -1/13/20- CT chest- Relatively stable spiculated opacity abiola bleeding or fevers. Has had some nausea but no emesis. No bowel or bladder changes. His tailbone pain is mild and intermittent, stable from last visit. No recent bleeding. Still smoking same amt- about 10 cigarettes per day.   Denies any alcohol use Allergies:     Penicillins             HIVES    Psychosocial History:  Social History    Patient does not qualify to have social determinant information on file (likely too young). Social History Narrative      , lives with his wife.   No ch bilaterally  GI/Abd: mild RUQ tenderness without any palpable abnormalities. Minimal ascites. Normal bowel sounds.  No hepatosplenomegaly  Neurological: Grossly intact    Lymphatics: No palpable lymphadenopathy  Skin: No rashes or petechiae  Ext: mild sym 07/05/2019    AFPTM 27.6 (H) 06/21/2019    AFPTM 36.4 (H) 06/07/2019    AFPTM 117.9 (H) 05/24/2019    AFPTM 442.5 (H) 05/10/2019    AFPTM 2,006.4 (H) 04/26/2019    AFPTM 9,617.9 (H) 04/12/2019    AFPTM 30,440.0 (H) 03/27/2019    AFPTM 23,058.3 (H) 03/16/20 10/25/2016    T4F 1.2 07/19/2014    T4F 0.9 06/29/2013     Impression & Plan:     *Infiltrative hepatocellular carcinoma, unresectable  -have started first line nivolumab with palliative intent on 3/28/19 given simultaneous diagnosis of both liver and lung readdress at future visits    *insomnia  -Better lately. Continue melatonin and lunesta 3mg at bedtime. Avoid caffeine in afternoon. Limit smoking particularly later in the day. Avoid a lot of physical exercise just before bed. Avoid TV in bedroom.

## 2020-02-28 NOTE — PROGRESS NOTES
Pt here for C29D1.   Arrives Ambulating independently, accompanied by Self          Modifications in dose or schedule: No     Frequency of blood return and site check throughout administration: Prior to administration   Discharged to Home, Ambulating indepe

## 2020-03-27 NOTE — PROGRESS NOTES
Hematology/Oncology Clinic Follow Up Visit    Patient Name: Haleigh Birmingham Record Number: [id number]    YOB: 1960    PCP: Dr. America Hartman   Other providers: Dr. Michell Gan (liver)    Reason for Consultation:  Melvina mendez nivolumab  -6/7/19- cycle 6 nivolumab  -6/21/19- cycle 7 nivolumab  -6/28/19- CT c/a/p- Significant decrease in the size of the RUL lobe spiculated lung mass (21 x 23mm -> 11 x 14mm).   Decrease in size of heterogeneously enhancing mass in segment 5 of the significant radiographic differences noted despite dramatic response based on AFP levels and clear clinical improvement, therefore consensus is that his malignancy is not evaluable by imaging**  -1/13/20- CT chest- Relatively stable spiculated opacity abiola nausea but no emesis. No bowel or bladder changes. No recent bleeding. Still smoking same amt- about 10 cigarettes per day. Denies any alcohol use since last visit. He remains physically active. Energy is good, his mood is fine.       Past [employer] does not qualify to have social determinant information on file (likely too young). Social History Narrative      , lives with his wife. No children. Works as a technician specialist- fixes machines, HVAC and plumbing, maintenance.      Social H sounds. No hepatosplenomegaly  Neurological: Grossly intact    Lymphatics: No palpable lymphadenopathy  Skin: No rashes or petechiae  Ext: mild symmetric BLE edema, +large varicose veins present. Laboratory:  Lab Results   Component Value Date    WBC 1. 442.5 (H) 05/10/2019    AFPTM 2,006.4 (H) 04/26/2019    AFPTM 9,617.9 (H) 04/12/2019    AFPTM 30,440.0 (H) 03/27/2019    AFPTM 28,614.5 (H) 03/16/2019    AFPTM 3,785.7 (H) 01/21/2019     Lab Results   Component Value Date    REITCPERCENT 2.0 06/07/2019 echogenicity. The 1.4 cm left hepatic cyst.  Caudate lobe mass measures 6.1 x 5.5 x 5.6 cm. Right hepatic lobe mass measures 2.5 x 2.2 x 2.5 cm. BILIARY:  Normal appearing gallbladder, intrahepatic ducts, and common bile duct.   Common bile duct diamete HCV, EtOH abuse  -on Lasix and sodium restrictions.   Continue spironolactone QOD  -advised to completely abstaining from etoh- will continue assess compliance.  -Following with Dr. Meli Parrish  -follow CMP q4 weeks with treatments    *NSCLC, squamous cell type

## 2020-03-27 NOTE — PROGRESS NOTES
Pt here for C24 OPdivo.   Arrives Ambulating independently, accompanied by Self           Patient reports possible pregnancy since last therapy cycle: Not Applicable    Modifications in dose or schedule: No     Frequency of blood return and site check throu

## 2020-04-16 NOTE — TELEPHONE ENCOUNTER
Paula Luevano called saying he has some swelling on his rib cage, and some pain on his left side. He says he may need an xray.  Please call

## 2020-04-16 NOTE — TELEPHONE ENCOUNTER
Let patient know if he is okay can wait until apt. He verbalizes understanding and will call if something changes.

## 2020-04-16 NOTE — TELEPHONE ENCOUNTER
Returned call to [facility]-he was doing some painting the other day and bent over and felt a pop on left lower rib cage.  (has had rib breaks before) no SOB, pain with movement, or raising of arm (6/10) otherwise pain level low when not active  Some swelling a

## 2020-04-22 PROBLEM — R07.81 RIB PAIN ON LEFT SIDE: Status: ACTIVE | Noted: 2020-01-01

## 2020-04-22 NOTE — PROGRESS NOTES
Pt here for C25.   Arrives Ambulating independently, accompanied by Self           Patient reports possible pregnancy since last therapy cycle: Not Applicable    Modifications in dose or schedule: No     Frequency of blood return and site check throughout a

## 2020-04-22 NOTE — PROGRESS NOTES
Hematology/Oncology Clinic Follow Up Visit    Patient Name: Haleigh Birmingham Record Number: [id number]    YOB: 1960    PCP: Dr. Marlen Torres   Other providers: Dr. Agustin Domingo (liver)    Reason for Consultation:  Reginald mendez nivolumab  -6/7/19- cycle 6 nivolumab  -6/21/19- cycle 7 nivolumab  -6/28/19- CT c/a/p- Significant decrease in the size of the RUL lobe spiculated lung mass (21 x 23mm -> 11 x 14mm).   Decrease in size of heterogeneously enhancing mass in segment 5 of the significant radiographic differences noted despite dramatic response based on AFP levels and clear clinical improvement, therefore consensus is that his malignancy is not evaluable by imaging**  -1/13/20- CT chest- Relatively stable spiculated opacity abiola His RUQ abd pain and tailbone pains he had previously are less of an issue, but still seem to recur every once and awhile but are mild. Still hasn't tried to use a wedge or donut cushion. He denies any increased ascites or swelling.   No bleeding or f times daily with meals. , Disp: 180 tablet, Rfl: 3  folic acid 1 MG Oral Tab, Take 1 tablet (1 mg total) by mouth daily. , Disp: 90 tablet, Rfl: 3  furosemide 40 MG Oral Tab, Take 1 tablet (40 mg total) by mouth 2 (two) times daily. , Disp: 60 tablet, Rfl: 6 lb)  01/31/20 : 108.3 kg (238 lb 12.8 oz)  01/17/20 : 108.2 kg (238 lb 9.6 oz)  01/03/20 : 107.8 kg (237 lb 9.6 oz)    ECOG PS: 1    Physical Examination:  General: Patient is alert and oriented, not in acute distress  Psych: Mood and affect are appropriat AFPTM 22.0 (H) 04/22/2020    AFPTM 20.2 (H) 03/27/2020    AFPTM 22.9 (H) 02/28/2020    AFPTM 19.9 (H) 01/31/2020    AFPTM 19.7 (H) 01/17/2020    AFPTM 19.9 (H) 01/03/2020    AFPTM 23.5 (H) 12/20/2019    AFPTM 19.2 (H) 12/06/2019    AFPTM 17.3 (H) 11/22/201 (H) 12/17/2018     Lab Results   Component Value Date    JOYCE 0.70 12/17/2018     Lab Results   Component Value Date    TSH 1.270 04/22/2020    TSH 1.650 01/31/2020    TSH 2.650 12/20/2019    TSH 1.740 11/08/2019    TSH 1.600 09/27/2019    TSH 1.710 by any form of imaging- CT, PET/CT, or MRI. We will continue to have to follow him clinically and with serial AFP monitoring to assess his ongoing response.   We will still follow with serial CT scans occasionally to evaluate for any significant new findin Continue Tylenol as needed. If ongoing worsening will obtain plain films. *RUQ pain/tenderness  -etiology unclear. U/S and labs and exam unrevealing. Is less bothersome lately.   Continue Tylenol prn    *left lower rib pain  -likely MSK strain or ri

## 2020-04-22 NOTE — PROGRESS NOTES
Outpatient Oncology Care Plan  Problem list:  fatigue  knowledge deficit    Problems related to:    chemotherapy  side effect of treatment    Interventions:  monitor effect of therapy  promoted rest  provided general teaching    Expected outcomes:  underst

## 2020-05-20 NOTE — PROGRESS NOTES
Hematology/Oncology Clinic Follow Up Visit    Patient Name: Haleigh Birmingham Record Number: [id number]    YOB: 1960    PCP: Dr. Bette Regalado   Other providers: Dr. Kala Mott (liver)    Reason for Consultation:  Mary mendez nivolumab  -6/7/19- cycle 6 nivolumab  -6/21/19- cycle 7 nivolumab  -6/28/19- CT c/a/p- Significant decrease in the size of the RUL lobe spiculated lung mass (21 x 23mm -> 11 x 14mm).   Decrease in size of heterogeneously enhancing mass in segment 5 of the significant radiographic differences noted despite dramatic response based on AFP levels and clear clinical improvement, therefore consensus is that his malignancy is not evaluable by imaging**  -1/13/20- CT chest- Relatively stable spiculated opacity abiola favorable response as above. He is feeling overall better lately. No longer with any rib pain or RUQ abdominal pain. His tailbone pain is less lately as well. His energy is good. No new aches or pains. No dyspnea. Cough is unchanged from baseline.  Mila Taveras DAY (Patient taking differently: Taking every other day ), Disp: 90 tablet, Rfl: 1  carvedilol 12.5 MG Oral Tab, Take 1 tablet (12.5 mg total) by mouth 2 (two) times daily with meals. , Disp: 180 tablet, Rfl: 3  folic acid 1 MG Oral Tab, Take 1 tablet (1 mg 4041)    Wt Readings from Last 6 Encounters:  05/20/20 : 108 kg (238 lb)  04/22/20 : 105.5 kg (232 lb 9.6 oz)  03/27/20 : 103.1 kg (227 lb 3.2 oz)  02/28/20 : 105.2 kg (232 lb)  01/31/20 : 108.3 kg (238 lb 12.8 oz)  01/17/20 : 108.2 kg (238 lb 9.6 oz)    E (H) 04/22/2020     Lab Results   Component Value Date    AFPTM 23.8 (H) 05/20/2020    AFPTM 22.0 (H) 04/22/2020    AFPTM 20.2 (H) 03/27/2020    AFPTM 22.9 (H) 02/28/2020    AFPTM 19.9 (H) 01/31/2020    AFPTM 19.7 (H) 01/17/2020    AFPTM 19.9 (H) 01/03/2020 KAPPALTCHN 3.633 (H) 12/17/2018      Lab Results   Component Value Date    LAMBDALTCH 5.226 (H) 12/17/2018     Lab Results   Component Value Date    BRODIERATIO 0.70 12/17/2018     Lab Results   Component Value Date    TSH 1.270 04/22/2020    TSH 1.650 01/ nivolumab as above. Obtain CT c/a/p next in 3 months (~8/20/20)   -Depending on stability of response may consider SBRT at some point.       *anemia, leukopenia, thrombocytopenia  -due to liver dysfunction with some low grade DIC, splenomegaly, HCV, malign

## 2020-05-20 NOTE — PROGRESS NOTES
Outpatient Oncology Care Plan  Problem list:  fatigue  knowledge deficit    Problems related to:    chemotherapy  side effect of treatment    Interventions:  provided general teaching    Expected outcomes:  understands plan of care    Progress towards outc

## 2020-05-20 NOTE — PROGRESS NOTES
Pt here for C26D1.   Arrives Ambulating independently, accompanied by Self         Modifications in dose or schedule: No     Frequency of blood return and site check throughout administration: Prior to administration   Discharged to Home, Ambulating indepen

## 2020-06-01 NOTE — PROGRESS NOTES
Nutrition screen complete as triggered by [facility] dx of [facility]., lung cancer. Chart reviewed. Pt appears nutritionally stable at present. RD available on consult.

## 2020-06-17 PROBLEM — D68.9 COAGULOPATHY (HCC): Status: ACTIVE | Noted: 2020-01-01

## 2020-06-17 NOTE — PROGRESS NOTES
Hematology/Oncology Clinic Follow Up Visit    Patient Name: Haleigh Birmingham Record Number: [id number]    YOB: 1960    PCP: Dr. Alejo Dillon   Other providers: Dr. Eric Alcantara (liver)    Reason for Consultation:  Fior mendez nivolumab  -6/7/19- cycle 6 nivolumab  -6/21/19- cycle 7 nivolumab  -6/28/19- CT c/a/p- Significant decrease in the size of the RUL lobe spiculated lung mass (21 x 23mm -> 11 x 14mm).   Decrease in size of heterogeneously enhancing mass in segment 5 of the significant radiographic differences noted despite dramatic response based on AFP levels and clear clinical improvement, therefore consensus is that his malignancy is not evaluable by imaging**  -1/13/20- CT chest- Relatively stable spiculated opacity abiola Presents for follow up. He continues to tolerate treatment well without any known side effects. He reports the chronic pain he has in his left great toe/metatarsal is more bothersome lately.   Recently met with Dr. Kacy Bailey again and wants to proceed w Tab, TAKE 1 TABLET BY MOUTH EVERY DAY, Disp: 90 tablet, Rfl: 1  ESZOPICLONE 3 MG Oral Tab, TAKE 1 TABLET BY MOUTH NIGHTLY, Disp: 30 tablet, Rfl: 3  acetaminophen 500 MG Oral Tab, Take 500 mg by mouth every 6 (six) hours as needed for Pain., Disp: , Rfl: (Calculated - sq m): 2.31 sq meters (06/17 0948)  Pulse: 74 (06/17 0948)  BP: 133/78 (06/17 0948)  Temp: 98.4 °F (36.9 °C) (06/17 0948)  Do Not Use - Resp Rate: --  SpO2: 97 % (06/17 0948)    Wt Readings from Last 6 Encounters:  06/17/20 : 109.9 kg (242 lb 06/17/2020    ALB 3.0 (L) 06/17/2020    GLOBULT 3.4 06/29/2013    GLOBULIN 4.2 06/17/2020    ALBGLOBRAT 1.1 06/29/2013     (L) 06/17/2020    K 3.7 06/17/2020     06/17/2020    CO2 24.0 06/17/2020     Lab Results   Component Value Date    INR 1. 06/07/2019    CRP <0.29 12/17/2018     Lab Results   Component Value Date     06/07/2019     12/17/2018     Lab Results   Component Value Date    HAPT <7.8 (L) 06/07/2019    HAPT 7.8 (L) 12/17/2018     No results found for: ELECTMS1  Lab Resu Unfortunately his liver malignancy is not reliably evaluated by any form of imaging- CT, PET/CT, or MRI. We will continue to have to follow him clinically and with serial AFP monitoring to assess his ongoing response.   We will still follow with serial CT related to cirrhosis  -he is cleared for surgery as desired by podiatry from heme/onc perspective. I recommend 1 unit of platelets be given preoperatively to reduce the risk for excessive bleeding.   Based on his TEG performed today his MA is low which ind

## 2020-06-17 NOTE — PROGRESS NOTES
Pt here for C 27 D 1 opdivo.   Arrives Ambulating independently, accompanied by Self           Patient reports possible pregnancy since last therapy cycle: Not Applicable    Modifications in dose or schedule: No     Frequency of blood return and site check

## 2020-07-17 NOTE — PROGRESS NOTES
Hematology/Oncology Clinic Follow Up Visit    Patient Name: Haleigh Birmingham Record Number: [id number]    YOB: 1960    PCP: Dr. Dhruv Gamez   Other providers: Dr. Benoit Lester (liver)    Reason for Consultation:  Rowena mendez nivolumab  -6/7/19- cycle 6 nivolumab  -6/21/19- cycle 7 nivolumab  -6/28/19- CT c/a/p- Significant decrease in the size of the RUL lobe spiculated lung mass (21 x 23mm -> 11 x 14mm).   Decrease in size of heterogeneously enhancing mass in segment 5 of the significant radiographic differences noted despite dramatic response based on AFP levels and clear clinical improvement, therefore consensus is that his malignancy is not evaluable by imaging**  -1/13/20- CT chest- Relatively stable spiculated opacity abiola ==============================  Interval events: Presents for follow up. He continues to tolerate treatment well without any known side effects.   He continues to have some chronic pain in his left great toe/metatarsal for which he is planning to get s TAKE 1 TABLET BY MOUTH EVERY DAY, Disp: 90 tablet, Rfl: 1  ESZOPICLONE 3 MG Oral Tab, TAKE 1 TABLET BY MOUTH NIGHTLY, Disp: 30 tablet, Rfl: 3  acetaminophen 500 MG Oral Tab, Take 500 mg by mouth every 6 (six) hours as needed for Pain., Disp: , Rfl:   leticia - sq m): 2.3 sq meters (07/17 0934)  Pulse: 61 (07/17 0934)  BP: 127/62 (07/17 0934)  Temp: 97.7 °F (36.5 °C) (07/17 0934)  Do Not Use - Resp Rate: --  SpO2: 95 % (07/17 0934)    Wt Readings from Last 6 Encounters:  07/17/20 : 108.9 kg (240 lb)  06/17/20 : ALBGLOBRAT 1.1 06/29/2013     (L) 07/17/2020    K 3.8 07/17/2020     07/17/2020    CO2 24.0 07/17/2020     Lab Results   Component Value Date    INR 1.55 (H) 07/17/2020     Lab Results   Component Value Date    AFPTM 27.9 (H) 07/17/2020    AFPT Date     06/07/2019     12/17/2018     Lab Results   Component Value Date    HAPT <7.8 (L) 06/07/2019    HAPT 7.8 (L) 12/17/2018     No results found for: [facility] [place]  Lab Results   Component Value Date    KAPPALTCHN 3.633 (H) 12/17/2018      La reliably evaluated by any form of imaging- CT, PET/CT, or MRI. We will continue to have to follow him clinically and with serial AFP monitoring to assess his ongoing response.   We will still follow with serial CT scans occasionally to evaluate for any sig cirrhosis  -he is cleared for surgery as desired by podiatry from heme/onc perspective. I recommend 1 unit of platelets be given preoperatively to reduce the risk for excessive bleeding.   Based on his TEG performed today his MA is low which indicates redu

## 2020-07-17 NOTE — PROGRESS NOTES
Outpatient Oncology Care Plan  Problem list:  knowledge deficit     Problems related to:    disease/disease progression     Interventions:  provided general teaching     Expected outcomes:  understands plan of care     Progress towards outcome:  making pro

## 2020-07-17 NOTE — PROGRESS NOTES
Pt here for C28D1.   Arrives Ambulating independently, accompanied by Self           Patient reports possible pregnancy since last therapy cycle: Not Applicable    Modifications in dose or schedule: No     Frequency of blood return and site check throughout

## 2020-08-14 PROBLEM — K92.1 MELENA: Status: ACTIVE | Noted: 2020-01-01

## 2020-08-14 PROBLEM — R13.10 ODYNOPHAGIA: Status: ACTIVE | Noted: 2020-01-01

## 2020-08-14 NOTE — PROGRESS NOTES
Pt here for [address].   Arrives Ambulating independently, accompanied by Self           Patient reports possible pregnancy since last therapy cycle: Not Applicable    Modifications in dose or schedule: No     Frequency of blood return and site check thr

## 2020-08-14 NOTE — PROGRESS NOTES
Hematology/Oncology Clinic Follow Up Visit    Patient Name: Haleigh Birmingham Record Number: [id number]    YOB: 1960    PCP: Dr. Mary Kent   Other providers: Dr. Maurizio Hart (liver)    Reason for Consultation:  Gerri mendez nivolumab  -6/7/19- cycle 6 nivolumab  -6/21/19- cycle 7 nivolumab  -6/28/19- CT c/a/p- Significant decrease in the size of the RUL lobe spiculated lung mass (21 x 23mm -> 11 x 14mm).   Decrease in size of heterogeneously enhancing mass in segment 5 of the significant radiographic differences noted despite dramatic response based on AFP levels and clear clinical improvement, therefore consensus is that his malignancy is not evaluable by imaging**  -1/13/20- CT chest- Relatively stable spiculated opacity abiola imaging prior to start of immunotherapy.   -8/12/20- CT c/a/p- No new suspicious nodule, mass or consolidation. Spiculated opacity adjacent to the horizontal fissure in the right upper lobe is stable.  Right hilar lymph node has increased in size since prio ([facility] 8/22/2017   • Leukocytopenia 9/23/2014   • Morbid obesity with BMI of 40.0-44.9, adult ([facility] 9/23/2014   • Primary lung squamous cell carcinoma, right ([facility] 3/14/2019   • Severe obesity (BMI 35.0-39. 9) 8/22/2017   • Symptomatic varicose veins of both for 20yrs, 1ppd for 10 years, 10 cigs per day for the last few years    Alcohol use: No      Alcohol/week: 2.0 - 4.0 standard drinks      Types: 2 - 4 Standard drinks or equivalent per week      Frequency: Never      Comment: none, quit 2/2019.  +prior hx (L) 07/17/2020    PLT 44.0 (L) 06/17/2020    PLT 43.0 (L) 05/20/2020   ANC 2620  Lab Results   Component Value Date     (H) 07/17/2020    BUN 11 07/17/2020    BUNCREA 14.1 07/17/2020    CREATSERUM 0.78 07/17/2020    CREATSERUM 0.74 06/17/2020    CRE Results   Component Value Date    COLIN 298.1 12/17/2018     Lab Results   Component Value Date    SAT 39 12/17/2018     Lab Results   Component Value Date    B12 1,380 (H) 06/07/2019    B12 966 09/23/2014     Lab Results   Component Value Date    MMA 0.21 1 Split Time      min 5.2   R Time      5.0 - 10.0 min 5.7   K Time      1.0 - 3.0 min 3.2 (H)   Alpha Angle      53.0 - 72.0 deg 56.8   Max Amplitude      50.0 - 70.0 mm 40.0 (L)   G Clot Strength      4.5 - 11.0 Kd/sc 3.3 (L)   % Lysis 30 Min      0.0 - to limit smoking. I have previously encouraged his wife that their chances of successful quitting is much higher if they both quit. Will continue to readdress at future visits    *insomnia  -Better lately. Continue melatonin and lunesta 3mg at bedtime.

## 2020-08-18 NOTE — TELEPHONE ENCOUNTER
Pt called LVM stating he is feeling better. He was told to touch base on Tuesday to talk about new findings on MRI. He is seeing GI this afternoon and will be leaving his house around 1pm. Pt states can talk another time if doesn't get around to calling.  A

## 2020-08-19 NOTE — TELEPHONE ENCOUNTER
Imaging reviewed in our multidisciplinary lung tumor board. It is felt the right hilar lymph node is not significantly changed from prior. Consensus recommendation is to continue imaging surveillance only.   We will plan to repeat CT scan next in 3 months

## 2020-08-22 PROBLEM — K92.1 MELANOTIC STOOLS: Status: ACTIVE | Noted: 2020-01-01

## 2020-08-22 PROBLEM — K70.31 ALCOHOLIC CIRRHOSIS OF LIVER WITH ASCITES (HCC): Status: ACTIVE | Noted: 2020-01-01

## 2020-08-22 PROBLEM — R10.9 ABDOMINAL PAIN, ACUTE: Status: ACTIVE | Noted: 2020-01-01

## 2020-08-22 PROBLEM — R73.9 HYPERGLYCEMIA: Status: ACTIVE | Noted: 2020-01-01

## 2020-08-22 NOTE — ED NOTES
Patient is resting comfortably. Wife at bedside. hospitalist has been at bedside as well. Patient updated on results and plan for admission.

## 2020-08-22 NOTE — ED PROVIDER NOTES
Patient Seen in: [facility] Emergency Department      History   Patient presents with:  GI Bleeding  Abdomen/Flank Pain    Stated Complaint: Cancer pt, black stools, abd pain, has not eaten 48-jose roberto hours.      HPI    Patient is 79-year-old male presen both lower extremities 8/22/2017   • Thrombocytopenia (HCC) 8/22/2017   • Uncomfortable fullness after meals    • Unspecified essential hypertension    • Visual impairment     glasses   • Vomiting    • Weight loss               Past Surgical History:   Pro appreciated. There is no JVD. No meningeal signs or nuchal rigidity appreciated. No stridor. LUNGS: Clear to auscultation bilaterally with no wheeze. There is good equal air entry bilaterally. HEART: Regular rate and rhythm. Normal S1, S2 no S3, or S4.  Jr File ACTIVATED - Normal   RAPID SARS-COV-2 BY PCR - Normal   CBC WITH DIFFERENTIAL WITH PLATELET    Narrative: The following orders were created for panel order CBC WITH DIFFERENTIAL WITH PLATELET.   Procedure                               Abnormality 11:47 AM     Finalized by (CST): Hardin Cooks, MD on 8/22/2020 at 11:51 AM             MDM     Patient had an IV line established blood work drawn including a CBC, chemistries, BUN/creatinine, and blood sugar all of which are essentially unremarkable List                       Present on Admission  Date Reviewed: 8/21/2020          ICD-10-CM Noted POA    Abdominal pain, acute R10.9 8/22/2020 Unknown    Hyperglycemia R73.9 8/22/2020 Yes    Melena K92.1 8/14/2020 Yes

## 2020-08-22 NOTE — ED NOTES
O2 saturation observed to be 90% RA following pain medication administration. 2L nc O2 placed on patient, tolerating well and O2 sat increased to 95%.

## 2020-08-22 NOTE — H&P
[facility]  History and Physical     Kirk Hollingsworth Patient Status:  Emergency    10/11/1960 MRN [id number]   Location [facility] [facility] Attending Risa Lange MD   Hosp Day # 0 PCP Jonatan Lopez DO     Chief Complai He has quit using smokeless tobacco.  His smokeless tobacco use included snuff. He reports that he does not drink alcohol or use drugs.     Family History:   Family History   Problem Relation Age of Onset   • Cancer Neg    • Blood Disorder Neg         Aller PERRLA. Anicteric. Neck: No lymphadenopathy. Respiratory: Clear to auscultation bilaterally. No wheezes. No rhonchi. Cardiovascular: S1, S2. Regular rate and rhythm. No murmurs, rubs or gallops. Chest and Back: No tenderness or deformity. Abdomen:  So full  · Menon: no    Plan of care discussed with patient and er md    Art Stager, MD  8/22/2020

## 2020-08-22 NOTE — ED NOTES
Patient reports pain decreasing to abdomen and appears more comfortable at this time. Waiting for CT. Wife sitting at bedside. No distress observed. Nausea decreased and controlled.

## 2020-08-22 NOTE — PROGRESS NOTES
[facility] Dosing Service  Antibiotic Dosing    Kirk Hollingsworth is a 60year old for whom pharmacy is dosing levaquin for treatment of  intra-abdominal infection.  .  Other antibiotics (Not dosed by pharmacy): flagyl IV    Allergies: is allergic to penici

## 2020-08-22 NOTE — ED INITIAL ASSESSMENT (HPI)
Patient reports abdominal pain/bloating with hx of cirrhosis. Reports nausea, difficulty eating/drinking. No fever/chills. Being followed by GI. Admits to black stools, pepto bismol taken last week. Symptoms for 1-2 weeks.

## 2020-08-22 NOTE — CONSULTS
[facility]  Report of GI Consultation    Gerri Perez Patient Status:  Emergency    10/11/1960 MRN [id number]   Location [facility] [facility] Attending Bright Cain MD   Hosp Day # 0 PCP Mary Kent,      Date of Admiss Medical History  Past Medical History:   Diagnosis Date   • Abdominal pain    • Alcoholism /alcohol abuse ([facility] 9/23/2014   • Black stools    • Blood in the stool    • Chronic cough 5/8/2015   • Cigarette smoker 5/8/2015    50 pack year h/o smoking    • Co point review of systems was completed. Pertinent positives and negatives noted in the the HPI. Physical Exam:   Blood pressure 125/80, pulse 82, temperature 98.2 °F (36.8 °C), temperature source Temporal, resp.  rate 16, height 71\", weight 232 lb (105 ultrasound may be done. 3. There is new infiltration of the mesenteric fat with ground-glass opacity. This may represent a developing infectious or inflammatory process. Developing peritonitis is of consideration.   However, this finding can also be seen

## 2020-08-22 NOTE — CONSULTS
[facility]  Report of  Surgical Consultation with History and Physical Exam    Edjuan david Robbins Patient Status:  Emergency    10/11/1960 MRN [id number]   Location [facility] [facility] Attending Mitch Bone MD   Hosp Day # 0 PCP Friday. The patient's white blood cell count is within normal limits at 6.0. Hemoglobin is within normal limits at 14.6. The patient's platelets are low at 80.0 AST is slightly elevated at 43. Alk phos is slightly elevated at 132.   Total bilirubin is history. He has quit using smokeless tobacco.  His smokeless tobacco use included snuff. He reports that he does not drink alcohol or use drugs.     Allergies:    Penicillins             HIVES    Medications:    Current Facility-Administered Medications: no rales, no rhonchi. Good excursion of the diaphragms. No secondary use of accessory respiratory musculature. Cardiac: Regular rate and rhythm. No murmur. Abdomen: Obese, mildly distended, tympanic to percussion.   Mildly tender to palpation on the r Coccydynia     RUQ abdominal pain     Rib pain on left side     Coagulopathy (HCC)     Melena     Odynophagia     Hyperammonemia (HCC)     Hyponatremia     Hyperglycemia     Abdominal pain, acute      Mr. Vanna Álvarez is a 79-year-old male with a complex medica

## 2020-08-22 NOTE — ED NOTES
Patient feeling much better following NG tube placement. Remains on low intermittent suction. Tolerating well. No coughing/sob, speaking clearly. Secured to nose and pinned to gown. Remains alert and appropriate. Reports pain and nausea controlled.  Resting

## 2020-08-22 NOTE — ED NOTES
Patient and his wife remain updated with plan of care. Pain decreasing and controlled. Provided with bags for belongings as well as denture cup. Alert and appropriate.

## 2020-08-22 NOTE — ED NOTES
Radiology at bedside for portable chest xray for NG tube placement confirmation. Patient feeling better after tube insertion, nausea controlled. Wife remains at bedside.

## 2020-08-22 NOTE — PLAN OF CARE
NURSING ADMISSION NOTE      Patient admitted via Cart  Oriented to room. Safety precautions initiated. Bed in low position. Call light in reach. Report received from [facility] in ER. Pt received without NGT.  Removed in ER and attempted to replace w

## 2020-08-23 NOTE — PROGRESS NOTES
[facility]  Progress note     [employer] Patient Status:  Emergency    10/11/1960 MRN [id number]   Location 6[facility] Attending Shaw Leyva MD   [facility] Day # 1 PCP Mello Harris DO     Cc: abd pain  Oris Guy pain/nausea; possible duodenal/gastric outlet obstruction? EGD tentatively planned. Lipase mildly elevated, unclear significance. Less likely cholecystitis? Empiric levaquin/flagyl for now  2. Melena; IV PPI; NPO; fluids; egd tentatively planned  3.  Hype

## 2020-08-23 NOTE — ANESTHESIA PREPROCEDURE EVALUATION
PRE-OP EVALUATION    Patient Name: Obed Valdez    Pre-op Diagnosis: Vomiting [R11.10]    Procedure(s):  ESOPHAGOGASTRODUODENOSCOPY     Surgeon(s) and Role:     * Rochelle Louis MD - Primary    Pre-op vitals reviewed.   Temp: 98.2 °F (36.8 °C)  Puls NaCl (FLAGYL) 5 mg/ml IVPB premix 500 mg, 500 mg, Intravenous, Q8H  [] 0.9% NaCl infusion, , Intravenous, Continuous  ondansetron HCl (ZOFRAN) injection 4 mg, 4 mg, Intravenous, Q4H PRN  [COMPLETED] levoFLOXacin in D5W (LEVAQUIN) IVPB premix 750 mg, 6  Multiple Vitamins-Minerals (EQL ONE DAILY MENS 50+ ADVANCE) Oral Tab, Take  by mouth., Disp: , Rfl:         Allergies: Penicillins      Anesthesia Evaluation    Patient summary reviewed.     Anesthetic Complications  (-) history of anesthetic complicatio (L) 08/23/2020    K 3.9 08/23/2020     08/23/2020    CO2 23.0 08/23/2020    BUN 17 08/23/2020    CREATSERUM 0.66 (L) 08/23/2020    GLU 97 08/23/2020    CA 7.6 (L) 08/23/2020     Lab Results   Component Value Date    INR 1.46 (H) 08/22/2020

## 2020-08-23 NOTE — PROGRESS NOTES
[facility]  Progress Note    Lenny Corbett Patient Status:  Inpatient    10/11/1960 MRN [id number]   [facility] 4NW-A Attending Bairon Trammell MD   Hosp Day # 1 PCP Bridgette Carvalho,      Subjective: The patient is resting in bed. Morbid obesity with BMI of 40.0-44.9, adult (HCC)     Cigarette smoker     Chronic cough     Influenza vaccination declined     Thrombocytopenia (HCC)     Severe obesity (BMI 35.0-39. 9)     Hypoalbuminemia due to protein-calorie malnutrition ([facility]     Symp opinion. Patient seen and examined. His abdomen is soft he states that his abdominal pain is resolved. Patient underwent EGD examination today with no evidence of obstruction of the stomach or duodenal sweep. Ultrasound pending.   Case discussed with

## 2020-08-23 NOTE — PLAN OF CARE
Pt alert/oriented x 4. NPO this morning for abd ultrasound. EGD done this morning as well. Abd soft, active bowel sounds. Denies abdominal pain, nausea/vomiting. IVF infusing. Scheduled IV antibiotics given. Clear liquid diet post EGD.   Wife visitin Encourage mobilization and activity  - Obtain nutritional consult as needed  - Establish a toileting routine/schedule  - Consider collaborating with pharmacy to review patient's medication profile  Outcome: Progressing

## 2020-08-23 NOTE — PROGRESS NOTES
GI Update  Spoke with Gen Surg, Dr Nia Maier. Suspect outlet obstruction, though etiology unclear. Felt better with NG suction, but removed his tube, will not allow replacement.   Will get EGD to assess for any duodenal lesion causing obstruction, US abdomen

## 2020-08-23 NOTE — ANESTHESIA POSTPROCEDURE EVALUATION
[facility]    Iman Dunne Patient Status:  Inpatient   Age/Gender 60year old male MRN [id number]   Location [facility]. Attending Shahid Carranza MD   [facility] Day # 1 PCP Genevieve Stark DO       Anesthesia Post-op Note    Procedure(s)

## 2020-08-23 NOTE — PLAN OF CARE
Assumed care of pt at [facility]. Pt A/O x4. Vitals stable. Afebrile. Pt denies pain. No c/o nausea or vomiting. IV fluids infusing per MAR. Continued on IV Flagyl and Levaquin. Kept NPO for abdominal ultrasound. Pt resting comfortably at this time.  Call light w medication profile  Outcome: Progressing

## 2020-08-24 NOTE — DISCHARGE SUMMARY
[facility]  DISCHARGE SUMMARY     Severiano Ching Patient Status:  Inpatient    10/11/1960 MRN [id number]   [facility] 4NW-A Attending Monique Sandra MD   Hosp Day # 2 PCP Zi Hastings DO     Date of Admission: 2020  Date Tabs  Commonly known as:  TYLENOL EXTRA STRENGTH      Take 500 mg by mouth every 6 (six) hours as needed for Pain.    Refills:  0     carvedilol 12.5 MG Tabs  Commonly known as:  COREG      Take 1 tablet (12.5 mg total) by mouth 2 (two) times daily with mickey possible for a visit in 3 weeks  for a follow-up EGD in 3-4 weeks.  The office will call you to arrange the date and time of your appointment     Appointments for Next 30 Days 8/25/2020 - 9/24/2020      Date Arrival Time Visit Type Length Department Provide 8:15 AM  CHEMOTHERAPY [2076] 60 min. [facility] in [address] [place] [address]    Patient Instructions:          Location Instructions: Your appointment is scheduled at the [facility] in [place].  The address is [address] [place] [address] affect.         -----------------------------------------------------------------------------------------------  PATIENT DISCHARGE INSTRUCTIONS: See electronic chart    Li Michael MD    Time spent:  > 30 minutes

## 2020-08-24 NOTE — PROGRESS NOTES
[facility]  Progress note     Tita Gudino Patient Status:  Emergency    10/11/1960 MRN [id number]   Location 6[facility] Attending [address] MD   [facility] Day # 2 PCP Magaly Molina DO     Cc: kandi Soria pain/nausea; egd reviewed; unclear etiology. ADAT; if any setbacks, consider gastric emptying study and SBFT. Appreciate GI/surgery assistance. 2. Melena; PPI; see egd  3. Hyperammonemia-low dose lactulose ordered  4. HCC-on immunotherapy  5.  Hyponatremia

## 2020-08-24 NOTE — PROGRESS NOTES
NURSING DISCHARGE NOTE    Discharged Home via Wheelchair. Accompanied by Support staff  Belongings Taken by patient/family. Pt aaox4, denies pain, tolerated soft diet in lunch and dinner, dc instructions given to pt, verbalized understanding.

## 2020-08-24 NOTE — PAYOR COMM NOTE
-------------  Appropriate for inpatient status per guidelines for abd pain in a patient with hepatocellular carcinoma. NG required.       ADMISSION REVIEW     Payor: [address] [place] [address] #:  [id number]  Authorization Number: [id number] ([facility] 8/22/2017   • Indigestion    • Leukocytopenia 9/23/2014   • Loss of appetite    • Morbid obesity with BMI of 40.0-44.9, adult ([facility] 9/23/2014   • Primary lung squamous cell carcinoma, right ([facility] 3/14/2019   • Severe obesity (BMI 35.0-39 tenderness to palpation appreciated throughout the abdomen. There is no guarding, no rebound, no mass, and no organomegaly appreciated. There is normoactive bowel sounds. There is no hernia.   RECTAL: There is black stool which is strongly Hemoccult positiv cholecystitis a follow-up ultrasound may be done. 3. There is new infiltration of the mesenteric fat with ground-glass opacity. This may represent a developing infectious or inflammatory process. Developing peritonitis is of consideration.   However, [facility] placement this time will hold off at this time. Patient breathing easily with no further complaints at time of admission.       Disposition and Plan     Clinical Impression:  Abdominal pain, acute  (primary encounter diagnosis)  Alcoholic cirrhosis of live 133*   K 3.9      CO2 26.0   ALKPHO 132*   AST 43*   ALT 36   BILT 3.2*   TP 7.2       Estimated Creatinine Clearance: 103.3 mL/min (based on SCr of 0.81 mg/dL).     Recent Labs   Lab 08/22/20  0926   PTP 18.1*   INR 1.46*       ASSESSMENT / PLAN: Concomitant dilation/distention of both raises question of some type of duodenal obstruction.       Recommendations:  1. NG placement. Has small esophageal varices and these are not a contra-indication to placement of NG tube.   2. Repeat US abdomen to ass planned  3. Hyperammonemia-low dose lactulose ordered  4. HCC-on immunotherapy  5. Hyponatremia-monitor-mild  6. 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 Lung cancer; dr. Melina Selby is his oncologist  8. Nicotine use disorder  9.  Hep C      hold lasix/enrrique abdomen  19. Increase PPI to q12 hours  20. Advance diet slowly; if nausea, loss of appetite, and epigastric pain recur, then consider gastric emptying scan and XRay small bowel follow-through as inpatient (otherwise, can be done as an outpatient)  21.  Con diffuse abd pain. Assessment:   22. [facility] (infiltrative and unresectable) with HCV/EtOH cirrhosis.  Prev with good response to current immunotherapy, last CT and labs from 1 week ago without obvious recurrence of cancer  23.  Acute abdominal pain: improved, rate and rhythm. No murmurs, rubs or gallops. Chest and Back: No tenderness or deformity. Abdomen: Soft, mild diffuse tenderness, nondistended. No rebound  Neurologic: No focal neurological deficits. Musculoskeletal: Moves all extremities.   Vallery Guppy

## 2020-08-24 NOTE — PLAN OF CARE
Patient alert and oriented x4. Vital signs stable. No fever. Room air. Maintained on IV antibiotics, tolerated well. No complaints of abdominal pain at this time. No nausea and vomiting. PPI per GI. On Clear liquid diet s/p EGD, tolerating well.  Ambulates collaborating with pharmacy to review patient's medication profile  Outcome: Progressing

## 2020-08-24 NOTE — PROGRESS NOTES
[facility]  Progress Note    Lenny Corbett Patient Status:  Inpatient    10/11/1960 MRN [id number]   [facility] 4NW-A Attending Bairon Trammell MD   [facility] Day # 2 PCP Bridgette Carvalho,      Subjective: The patient is resting in bed. Date    INR 1.46 (H) 08/22/2020    INR 1.55 (H) 08/14/2020    INR 1.55 (H) 07/17/2020       I/O last 3 completed shifts: In: 2715 [P.O.:440; I.V.:3455]  Out: 1700 [Urine:1700]  No intake/output data recorded.      US Abdomen 8/23/2020:  FINDINGS:    LIVER: constipation     Poorly controlled ascites     Other insomnia     Left foot pain     Alcohol abuse, in remission     Pain, dental     Coccydynia     RUQ abdominal pain     Rib pain on left side     Coagulopathy (HCC)     Melena     Odynophagia     Hyperamm We will sign off patient please renotify if necessary. .    My total face time with this patient was 30 minutes. Greater than half of our visit was spent in counseling the patient on the above listed diagnoses and treatment options.       Nehal Lance, DO

## 2020-08-24 NOTE — PROGRESS NOTES
Gastroenterology Progress Note  Asad Napier Patient Status:  Inpatient    10/11/1960 MRN [id number]   [facility] 4NW-A Attending Bessy Gregory MD   Hosp Day # 2 PCP Yolanda Knox DO Heterogeneous echotexture. Left hepatic cyst measures 0.7 cm. Right hepatic mass measures 2.4 x 2.5 x 2.1 cm. Additional right hepatic mass measures 5.8 x 6.0 x 6.0 cm. BILIARY:  Gallbladder wall is thickened measuring 6-7 mm.   Common bile duct measure patient and agree with above nurse practitioner's assessment and recommendations. Reports diarrhea that's dark in color, ongoing for 2 weeks. No pain. Wants to advance diet. Will repeat CBC today, advance to soft diet.  If tolerating, possible dc later [facility]

## 2020-08-31 NOTE — PROGRESS NOTES
Maggie Nettles  [facility] to for him to have double with Dr Melonie Dubin in early Oct if there is nothing on the schedule for September.   Thanks,  PM

## 2020-09-02 PROBLEM — E87.6 HYPOKALEMIA: Status: ACTIVE | Noted: 2020-01-01

## 2020-09-02 PROBLEM — K70.31 ASCITES DUE TO ALCOHOLIC CIRRHOSIS (HCC): Status: ACTIVE | Noted: 2020-01-01

## 2020-09-02 PROBLEM — E87.2 METABOLIC ACIDOSIS: Status: ACTIVE | Noted: 2020-01-01

## 2020-09-02 PROBLEM — N17.9 ACUTE KIDNEY INJURY (HCC): Status: ACTIVE | Noted: 2020-01-01

## 2020-09-02 PROBLEM — K92.2 LOWER GI BLEEDING: Status: ACTIVE | Noted: 2020-01-01

## 2020-09-02 PROBLEM — E87.20 METABOLIC ACIDOSIS: Status: ACTIVE | Noted: 2020-01-01

## 2020-09-02 PROBLEM — R53.0 NEOPLASTIC MALIGNANT RELATED FATIGUE: Status: ACTIVE | Noted: 2020-01-01

## 2020-09-02 NOTE — TELEPHONE ENCOUNTER
----- Message from Mishel Marino RN sent at 9/2/2020  8:20 AM CDT -----  Regarding: FW: Other  Contact: [phone]    ----- Message -----  From: Kirk Hollingsworth  Sent: 9/1/2020  10:10 PM CDT  To: Terrance Marlow  Subject: Other

## 2020-09-02 NOTE — CONSULTS
[facility]                       Gastroenterology [address] [facility] Gastroenterology    Estill Blizzard Patient Status:  Inpatient    10/11/1960 MRN [id number]   [facility] 4NW-A Attending Selam Tovar MD   Hosp Day # 0 PCP Shona after meals    • Unspecified essential hypertension    • Visual impairment     glasses   • Vomiting    • Weight loss      PSHx:   Past Surgical History:   Procedure Laterality Date   • BIOPSY OF SKIN LESION      face and leg   • ESOPHAGOGASTRODUODENOSCOPY anemia            Dermatologic: The patient reports no recent rashes or chronic skin disorders            Rheumatologic: The patient reports no history of chronic arthritis, myalgias, arthralgias            Genitourinary:  The patient reports no history of 09/02/2020     Recent Labs   Lab 09/02/20  1116   *   BUN 18   CREATSERUM 1.06   GFRAA 88   GFRNAA 76   CA 8.5   *   K 3.1*   *   CO2 18.0*     Recent Labs   Lab 09/02/20  1116   RBC 3.91*   HGB 13.8   HCT 38.4*   MCV 98.2   MCH 35.3* is splenomegaly measuring 15.8 cm in craniocaudal dimension. No focal splenic lesions. KIDNEYS:  No mass, obstruction, or calcification. ADRENALS:  No mass or enlargement. AORTA/VASCULAR:  The aorta and IVC are unremarkable.   There is portal venous likely relating to portal venous hypertension. Dictated by (CST): Jorge Barrios,  on 9/02/2020 at 3:17 PM       Finalized by (CST): Jorge Barrios,  on 9/02/2020 at 3:26 PM       Impression:     1. Abd pain  2. Frequent black diarrhea  3.  [address] in pt

## 2020-09-02 NOTE — ED PROVIDER NOTES
Patient Seen in: [facility] Emergency Department      History   Patient presents with:  Abdomen/Flank Pain  Nausea/Vomiting/Diarrhea    Stated Complaint: nvdr, abd pain    HPI    24-year-old male presents emergency department he states his been havi 8/22/2017   • Uncomfortable fullness after meals    • Unspecified essential hypertension    • Visual impairment     glasses   • Vomiting    • Weight loss               Past Surgical History:   Procedure Laterality Date   • BIOPSY OF SKIN LESION      face a prior to and after the exam.  Stethoscope and any equipment used during my examination was cleaned with super sani-cloth germicidal wipes following the exam.         Vital signs reviewed  General appearance: Patient is frail, slightly ill-appearing  HEENT: PLATELET    Narrative: The following orders were created for panel order CBC WITH DIFFERENTIAL WITH PLATELET.   Procedure                               Abnormality         Status                     ---------                               ----------- answered.   Admission disposition: 9/2/2020  1:18 PM                   Disposition and Plan     Clinical Impression:  Neoplastic malignant related fatigue  (primary encounter diagnosis)  Thrombocytopenia (HCC)  Lower GI bleeding  Hepatocellular carcinoma (H

## 2020-09-02 NOTE — TELEPHONE ENCOUNTER
I called pt and spoke with him over the phone, he is feeling poorly. Continues to have frequent black diarrhea despite hospitalization last week with EGD. He is feeling weak and having abdominal pain.   I am concerned he may have immunotherapy mediated en

## 2020-09-02 NOTE — H&P
[facility]  History and Physical     Tita Gudino Patient Status:  Inpatient    10/11/1960 MRN [id number]   [facility] 4NW-A Attending Carlotta Pineda MD   Hosp Day # 0 PCP Magaly Molina DO     Chief Complaint: Abdominal machelle Surgical History:   Past Surgical History:   Procedure Laterality Date   • BIOPSY OF SKIN LESION      face and leg   • ESOPHAGOGASTRODUODENOSCOPY (EGD) N/A 8/23/2020    Performed by Ulises Louis MD at [facility] ENDOSCOPY   • IR VARICOSE VEIN ENDOVENOUS LAS negatives noted in the HPI. Physical Exam:    /62 (BP Location: Left arm)   Pulse 66   Temp 97.9 °F (36.6 °C) (Oral)   Resp 18   Ht 5' 11\" (1.803 m)   Wt 217 lb 8 oz (98.7 kg)   SpO2 99%   BMI 30.34 kg/m²   General: No acute distress.  Alert and o discussed with ED physician    Gloria Moreno MD  9/2/2020

## 2020-09-02 NOTE — PLAN OF CARE
Admitted this 54y/o male from home due to black stools for weeks now, claims of having diarrhea PTA. Was recently hospitalized for 3 days treated for diarrhea then wa sent home. Patient has a history of alcohol abuse.  Alert & oriented,denies pain but w/ tend

## 2020-09-02 NOTE — ED INITIAL ASSESSMENT (HPI)
Pt here stating he has been having black stools for several weeks 8-10 times/d. Pt was admitted to hospital spent 3d and sent home. Pt is supposed to have a colonscopy and an appt with GI on 9/08/2020.  Denies vomiting c/o increased fatigue

## 2020-09-03 NOTE — PLAN OF CARE
Pt AOX4 with complaints of abd tenderness. Pt declines the need for pain medication. Pt up to the bathroom with 5 BM overnight. Per pt, 3 tarry and two green. Pt denies nausea this morning. Hgb down trending Q12H 13.8 at lunchtime and 12.3 after midnight. on venipuncture sites to achieve adequate hemostasis  - Assess for signs and symptoms of internal bleeding  - Monitor lab trends  - Patient is to report abnormal signs of bleeding to staff  - Avoid use of toothpicks and dental floss  - Use electric shaver

## 2020-09-03 NOTE — DIETARY NOTE
[facility]    NUTRITION INITIAL ASSESSMENT    NUTRITION DIAGNOSIS/PROBLEM:    Unintentional weight loss related to physiologic causes increasing nutrient needs and suspected inadequate energy intakes as evidenced by MST score of 3 for wt loss/decrease lb)  08/14/20 : 102.9 kg (226 lb 12.8 oz)  07/17/20 : 108.9 kg (240 lb)  06/17/20 : 109.9 kg (242 lb 3.2 oz)  05/20/20 : 108 kg (238 lb)  04/22/20 : 105.5 kg (232 lb 9.6 oz)  03/27/20 : 103.1 kg (227 lb 3.2 oz)  02/28/20 : 105.2 kg (232 lb)    NUTRITION:

## 2020-09-03 NOTE — PROGRESS NOTES
[facility] [place] HOSPITALIST  Progress Note     Terra Son Patient Status:  Inpatient    10/11/1960 MRN [id number]   [facility] 4NW-A Attending Shantanu Khanna DO   Hosp Day # 1 PCP Berenice Pallas, DO     Chief Complaint: black stools input(s): TROP, CK in the last 168 hours. Imaging: Imaging data reviewed in Epic.     Medications:   • potassium chloride 40mEq IVPB (peripheral line)  40 mEq Intravenous Once   • methylPREDNISolone Sodium Succ  80 mg Intravenous Q12H   • carvedilol

## 2020-09-03 NOTE — PROGRESS NOTES
[facility] Gastroenterology Progress Note    S: Pt still with black stools at this time, but no diarrhea and abd pain much improved after IV steroids last night.      O: BP 96/55 (BP Location: Left arm)   Pulse 57   Temp 97.7 °F (36.5 °C) (Oral)   Resp

## 2020-09-03 NOTE — PLAN OF CARE
Received pt at shift change. Pt denies pain Hgb  stable. Pt axox4 remains on RA. Pt was to have EGD in am, but has been cancelled and steroid started. Pt advanced to soft diet with supplements. Octreotide d/c'd.  All questions and concerns addressed, support

## 2020-09-03 NOTE — PAYOR COMM NOTE
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 Hx HCC.     ADMISSION REVIEW     Payor: [address] [place] [address] #:  [id number]  Authorization Number: [id number] • Loss of appetite    • Morbid obesity with BMI of 40.0-44.9, adult ([facility] 9/23/2014   • Primary lung squamous cell carcinoma, right ([facility] 3/14/2019   • Severe obesity (BMI 35.0-39. 9) 8/22/2017   • Symptomatic varicose veins of both lower extremities 8/22 scleral icterus, mucous membranes are moist, there is no erythema or exudate in the posterior pharynx  Neck: Supple no JVD no lymphadenopathy no meningismus no carotid bruit  CV: Regular rate and rhythm no murmur rub  Respiratory: Clear to auscultation flakito DIFFERENTIAL[[id number]]          Abnormal            Final result                 Please view results for these tests on the individual orders. AMMONIA, PLASMA   TYPE AND SCREEN    Narrative:      The following orders were created for panel order TYPE AND abdominal pain which she states is diffuse over his entire abdomen. Denies any fevers, chills, cough or chest pain.   In the emergency room the patient had a CT of his abdomen pelvis which shows signs consistent with hepatic cirrhosis, splenomegaly and 2 c SpO2 99%   BMI 30.34 kg/m²    General: No acute distress. Alert and oriented x 3. HEENT: Normocephalic atraumatic. Moist mucous membranes. EOM-I. PERRLA. Anicteric. Neck: No lymphadenopathy. No JVD. No carotid bruits.   Respiratory: Clear to auscultation you.  Wendy Bruner, APRN  5:35 PM  9/2/2020  [facility] Gastroenterology  [phone]     GI attending addendum:     I have personally seen and examined this patient and agree with above nurse practitioner's assessment and recommendations.      Briefly, t to eval for immunotherapy mediated or other cause.   -monitor CBC      *Infiltrative hepatocellular carcinoma, unresectable  -has been on nivolumab since 3/28/19 with an excellent response to therapy.  Unfortunately his liver malignancy is not reliably heidi Recent Labs   Lab 09/02/20  1116 09/03/20  0829   * 173*   BUN 18 20*   CREATSERUM 1.06 0.84   GFRAA 88 110   GFRNAA 76 95   CA 8.5 8.0*   ALB 2.6*  --    * 136   K 3.1* 3.5   * 113*   CO2 18.0* 16.0*   ALKPHO 130*  --    AST 49*  --

## 2020-09-03 NOTE — CONSULTS
Hematology/Oncology Initial Consultation Note    Patient Name: Haleigh Birmingham Record Number: [id number]    YOB: 1960   Date of Consultation: 9/3/2020   Physician requesting consultation: Dr. Umre Godoy    Reason for Consultation nivolumab  -5/24/19- cycle 5 nivolumab  -6/7/19- cycle 6 nivolumab  -6/21/19- cycle 7 nivolumab  -6/28/19- CT c/a/p- Significant decrease in the size of the RUL lobe spiculated lung mass (21 x 23mm -> 11 x 14mm).   Decrease in size of heterogeneously enhanc to treatment on 2/20/19, no significant radiographic differences noted despite dramatic response based on AFP levels and clear clinical improvement, therefore consensus is that his malignancy is not evaluable by imaging**  -1/13/20- CT chest- Relatively st decreased significantly since imaging prior to start of immunotherapy.   -8/12/20- CT c/a/p- No new suspicious nodule, mass or consolidation. Spiculated opacity adjacent to the horizontal fissure in the right upper lobe is stable.  Right hilar lymph node ha Medical History:   Diagnosis Date   • Abdominal pain    • Alcoholism /alcohol abuse ([facility] 9/23/2014   • Black stools    • Blood in the stool    • Chronic cough 5/8/2015   • Cigarette smoker 5/8/2015    50 pack year h/o smoking    • Constipation    • Diarrh 500 MG Oral Tab, Take 500 mg by mouth every 6 (six) hours as needed for Pain., Disp: , Rfl:   carvedilol 12.5 MG Oral Tab, Take 1 tablet (12.5 mg total) by mouth 2 (two) times daily with meals. , Disp: 180 tablet, Rfl: 3  furosemide 40 MG Oral Tab, Take 1 t HPI    Vital Signs:  Height: 180.3 cm (5' 11\") (09/02 1108)  Weight: 98.7 kg (217 lb 8 oz) (09/02 1551)  BSA (Calculated - sq m): 2.24 sq meters (09/02 1108)  Pulse: 57 (09/03 0431)  BP: 96/55 (09/03 0431)  Temp: 97.7 °F (36.5 °C) (09/03 0431)  Do Not Use within the posterior segment of the right lobe inferolaterally which is irregularly marginated, measuring approximately 3.8 x 2.5 cm. This is a nonspecific lesion.   Further assessment with contrast enhanced MR imaging, utilizing Eovist contrast agent is r peripherally which is nonspecific and irregularly marginated measuring 3.8 x 2.5 cm. An underlying neoplasm is not entirely excluded and further assessment with contrast enhanced MR imaging utilizing Eovist contrast agent is recommended.   3. Splenomegaly nivolumab on CT.  Have not needed to pursue resection or RT given improvement.      *anemia, leukopenia, thrombocytopenia  -due to liver dysfunction with some low grade DIC, splenomegaly, HCV, malignancy, folate def  -his wbc ct and plt ct are somewhat high

## 2020-09-04 NOTE — PLAN OF CARE
Problem: GASTROINTESTINAL - ADULT  Goal: Minimal or absence of nausea and vomiting  Description  INTERVENTIONS:  - Maintain adequate hydration with IV or PO as ordered and tolerated  - Nasogastric tube to low intermittent suction as ordered  - Evaluate e has had no stools during shift  so far. Oncology wanted patient to be on iv steroids for one more day, gaitsteady upon ambulation.

## 2020-09-04 NOTE — PLAN OF CARE
Pt AOX4 with no complaints of pain. Pt reports one black and green BM at 2030. Hgb down from 12.6 in the afternoon to 11.9 after midnight. Pt asymptomatic. IV steroids per MAR. Pt with poor sleep overnight. Denies n/v.  Soft diet menu given and pt will orde adequate hemostasis  - Assess for signs and symptoms of internal bleeding  - Monitor lab trends  - Patient is to report abnormal signs of bleeding to staff  - Avoid use of toothpicks and dental floss  - Use electric shaver for shaving  - Use soft bristle t

## 2020-09-04 NOTE — PROGRESS NOTES
Hematology/Oncology Progress Note    Patient Name: Haleigh Bimringham Record Number: [id number]    YOB: 1960     Reason for Consultation:  Jules Sánchez was seen today for the diagnosis of diarrhea, HCC, NSCLC    Interval events: Keke CO2 18.0* 16.0* 19.0*   BUN 18 20* 19*   CREATSERUM 1.06 0.84 0.88   GFRAA 88 110 108   GFRNAA 76 95 93   * 173* 161*   CA 8.5 8.0* 8.5   TP 6.3*  --  5.8*   ALB 2.6*  --  2.3*   ALKPHO 130*  --  93   AST 49*  --  34   ALT 40  --  35   BILT 2.5* wbc ct and plt ct are somewhat higher over the last month than his usual baseline which I suspect is related to a reactive marrow response associated with immunotherapy enteritis. -continue 1mg folic acid daily.    -CBC tomorrow       *coagulopathy relate

## 2020-09-04 NOTE — PROGRESS NOTES
[facility] PSYCHIATRIC [place] HOSPITALIST  Progress Note     Lesli Schaefer Patient Status:  Inpatient    10/11/1960 MRN [id number]   [facility] 4NW-A Attending Nidhi Li DO   Hosp Day # 2 PCP Vivienne Franco DO     Chief Complaint: black stools Estimated Creatinine Clearance: 99.6 mL/min (based on SCr of 0.84 mg/dL). Recent Labs   Lab 09/02/20  1116   PTP 19.7*   INR 1.62*       No results for input(s): TROP, CK in the last 168 hours. Imaging: Imaging data reviewed in Epic.     Me

## 2020-09-04 NOTE — PROGRESS NOTES
[facility] Gastroenterology Progress Note    S: Pt denies diarrhea at this time.      O: /55 (BP Location: Left arm)   Pulse 56   Temp 97.9 °F (36.6 °C) (Oral)   Resp 18   Ht 71\"   Wt 217 lb 8 oz (98.7 kg)   SpO2 96%   BMI 30.34 kg/m²     Gen: N

## 2020-09-05 NOTE — DISCHARGE SUMMARY
[facility] PSYCHIATRIC [place] HOSPITALIST  DISCHARGE SUMMARY     Mary Dey Patient Status:  Inpatient    10/11/1960 MRN [id number]   [facility] 4NW-A Attending Sd Aragon DO   Hosp Day # 3 PCP Bette Regalado DO     Date of Admission: 2020 • none    Incidental or significant findings and recommendations (brief descriptions):  • none    Lab/Test results pending at Discharge:   · none    Consultants:  • ID, oncology    Discharge Medication List:     Discharge Medications      START taking th Quantity:  60 tablet  Refills:  2           Where to Get Your Medications      These medications were sent to [facility]/pharmacy #[id number]- [place] - [address] [facility], [phone]  [address] [place] [address]    Phone:  [phone]   · predniS Thank you for your understanding. 9/18/2020  8:00 AM  ON TREATMENT VISIT FOLLOW-UP [8303] 15 min. [facility] in [facility], Lul Jain MD    Patient Instructions:          Location Instructions:       Your appointment is sched Alex 112, we have put the following visitor restrictions in place during the Coronavirus outbreak.   -Visitors 18 years and under are not allowed at the [facility]. -Only one visitor is allowed to accompany you to a physician visit. -No vis

## 2020-09-05 NOTE — PLAN OF CARE
Pt alert and oriented x4, room air, vitals stable. No c/o pain. BM this am; unable to visualize it but pt stating it was \"brown\". Discharge planning in place. Hem/onc signed off; RX for PO prednisone sent to pt's pharmacy. Pt verbalized understanding.  AV safe mobilization of patient  - Hold pressure on venipuncture sites to achieve adequate hemostasis  - Assess for signs and symptoms of internal bleeding  - Monitor lab trends  - Patient is to report abnormal signs of bleeding to staff  - Avoid use of tooth

## 2020-09-05 NOTE — PLAN OF CARE
Pt alert and oriented x4. VSS and afebrile. Denies pain. No BM overnight. Pt continues on IV steroids, tolerating well. No acute events overnight. Up as tolerated, free from falls and injury. Call light within reach. Will continue to monitor.

## 2020-09-08 NOTE — PAYOR COMM NOTE
--------------  DISCHARGE REVIEW    Payor: [address] #:  [id number]  Authorization Number: [id number]    Admit date: 9/2/20  Admit time:  9440  Discharge Date: 9/5/2020 12:53 PM     Admitting Physician: Royal Dunn, and he was discharged to home with extended course of PO steroids and holding on further nivolumab treatments.  He was advised to follow up with GI and oncology after discharge    Lace+ Score: 79  59-90 High Risk  29-58 Medium Risk  0-28   Low Risk should I use? Take 3 mg by mouth nightly. Refills:  0     ondansetron 4 MG Tbdp  Commonly known as:  ZOFRAN-ODT      Take 1 tablet (4 mg total) by mouth every 8 (eight) hours as needed for Nausea.    Stop taking on:  September 23, 2020  Quantity:  90 are allowed in the infusion area. -All visitors and patients will be screened for a temperatue greater than 100 degrees before entering our sites. -We encourage patients, who are receiving treatments in the infusion area, to be dropped off at the door.  [facility] in [facility]. The address is [address] , [facility]. Please park at the [address] and enter through [address]. Once you arrive, please register at the [address] [place] [address] on the second floor.   **Important Info for Cancer

## 2020-09-11 NOTE — PROGRESS NOTES
Outpatient Oncology Care Plan  Problem list:  fluid retention from steroids, pain and diarrhea is better. pt states he is hydrating well. on 100mg of prednisone. having colonoscopy on 9/17.      Problems related to:    side effect of treatment    Interventi

## 2020-09-11 NOTE — PROGRESS NOTES
ANP Visit Note    Patient Name: Estill Blizzard   YOB: 1960   Medical Record Number: [id number]   CSN: [id number]   Date of visit: 9/11/2020   Anjelica Boyd DO   No primary care provider on file.      Chief Complaint/Reason for Visit:  Laura Miguel nivolumab  -6/7/19- cycle 6 nivolumab  -6/21/19- cycle 7 nivolumab  -6/28/19- CT c/a/p- Significant decrease in the size of the RUL lobe spiculated lung mass (21 x 23mm -> 11 x 14mm).  Decrease in size of heterogeneously enhancing mass in segment 5 of the significant radiographic differences noted despite dramatic response based on AFP levels and clear clinical improvement, therefore consensus is that his malignancy is not evaluable by imaging**  -1/13/20- CT chest- Relatively stable spiculated opacity abiola imaging prior to start of immunotherapy.   -8/12/20- CT c/a/p- No new suspicious nodule, mass or consolidation. Spiculated opacity adjacent to the horizontal fissure in the right upper lobe is stable.  Right hilar lymph node has increased in size since prio Odynophagia     Hyperammonemia (HCC)     Hyponatremia     Hyperglycemia     Abdominal pain, acute     Alcoholic cirrhosis of liver with ascites (HCC)     Melanotic stools     Gastric out let obstruction     Hypokalemia     Acute kidney injury ([address]     Met IR VARICOSE VEIN ENDOVENOUS LASER ABLATION(CPT=36478)  2018   • NEEDLE BIOPSY LIVER  August 2020       Allergies:    Penicillins             HIVES    Family History:  Family History   Problem Relation Age of Onset   • Cancer Neg    • Blood Disorder Neg Emotionally abused: Not on file        Physically abused: Not on file        Forced sexual activity: Not on file    Other Topics      Concerns:         Service: Not Asked        Blood Transfusions: Not Asked        Caffeine Concern:  Yes Systems:  A comprehensive 14 point review of systems was completed. Pertinent positives and negatives noted in the the HPI. Performance Status: ECOG 1    Physical Examination:  General: Patient is alert and oriented x 3, not in acute distress.   Vital S

## 2020-09-17 NOTE — H&P
[address] [facility] Patient Status:  Hospital Outpatient Surgery    10/11/1960 MRN [id number]   [facility] ENDOSCOPY Attending Lolis Vu MD   Hosp Day # 0 PCP Shalonda Jacinto DO     History of at [facility] ENDOSCOPY   • IR VARICOSE VEIN ENDOVENOUS LASER ABLATION(CPT=36478)  2018   • NEEDLE BIOPSY LIVER  August 2020     Family History   Problem Relation Age of Onset   • Cancer Neg    • Blood Disorder Neg       reports that he quit smoking about 18 month incontinence, kidney failure, prostate problems. Endocrine: Denies thyroid disease, denies diabetes. Female complaints: Denies endometriosis, painful menstrual periods, heavy menstrual periods. Patient is not pregnant.   Psychosocial: Denies history of m bleeding, infection, pain, sedation, and perforation. The patient was also informed that polyps and tumors can be missed on rare occasions, which may require future procedures.  The patient indicates understanding of these issues and agrees to proceed with

## 2020-09-17 NOTE — ANESTHESIA POSTPROCEDURE EVALUATION
[facility] Arlee Lam Patient Status:  Hospital Outpatient Surgery   Age/Gender 60year old male MRN [id number]   Location [facility]. Attending Izabel Ramos MD   Hosp Day # 0 PCP Veronica Darling DO       Anesthesia Post-op

## 2020-09-17 NOTE — ANESTHESIA PREPROCEDURE EVALUATION
PRE-OP EVALUATION    Patient Name: Sonja Payer    Pre-op Diagnosis: iron deficiency anemia, esophagitis    Procedure(s):  COLONOSCOPY cold snare polypectomy  ESOPHAGOGASTRODUODENOSCOPY with biopsies    Surgeon(s) and Role:     * Charles Salazar MD - Cardiovascular                (+) obesity  (+) hypertension                                     Endo/Other  Comment: etoh abuse history                     (+) thrombocytopenia           Pulmonary  Comment: smoker                         Neuro/Psych    N normal.         Dental      Dental appliance(s): upper dentures and lower dentures       Pulmonary    Pulmonary exam normal.                 Other findings            ASA: 3   Plan: MAC  NPO status verified and patient meets guidelines.     Post-procedure p

## 2020-09-17 NOTE — OPERATIVE REPORT
EGD operative report  Patient Name: Nereida Barnes  Procedure: Esophagogastroduodenoscopy with cold forceps biopsy   Date of procedure: 9/17/2020    Indication: [address] with HCV cirrhosis; Esophagitis  Attending: Vic Sarmiento M.D.   Consent:  The risks, b appreciated. The squamocolumnar junction / esophagogastric junction / diaphragmatic impression were appreciated at 40 cm from the incisors. Stomach:   The gastric body, antrum, fundus, cardia, and angularis revealed a mosaic pattern consistent with p

## 2020-09-17 NOTE — OPERATIVE REPORT
Colon operative report  Patient Name: Jem Kearns  Procedure: Colonoscopy with cold snare polypectomy and cold forceps biopsies  Date of procedure: 9/17/2020    Indication: Iron deficiency anemia, diarrhea  Date of last colonoscopy: N/A - the current p it was placed in a retroflexed position, and the rectal bulb was thus visualized. The endoscope was righted, and air was suctioned from the colon to the best of my ability, as it was during withdrawn from the colon.   The endoscope was then removed from th 10 years. Given his underlying medical diagnosis, careful consideration of the risks/benefits should be weighed before proceeding at either time point.    Repeat Colonoscopy Indicated: As above

## 2020-09-18 NOTE — PROGRESS NOTES
Hematology/Oncology Clinic Follow Up Visit    Patient Name: Haleigh Birmingham Record Number: [id number]    YOB: 1960    PCP: Dr. Berenice Pallas   Other providers: Dr. Austin Gutierrez (liver)    Reason for Consultation:  Terra mendez nivolumab  -6/7/19- cycle 6 nivolumab  -6/21/19- cycle 7 nivolumab  -6/28/19- CT c/a/p- Significant decrease in the size of the RUL lobe spiculated lung mass (21 x 23mm -> 11 x 14mm).   Decrease in size of heterogeneously enhancing mass in segment 5 of the significant radiographic differences noted despite dramatic response based on AFP levels and clear clinical improvement, therefore consensus is that his malignancy is not evaluable by imaging**  -1/13/20- CT chest- Relatively stable spiculated opacity abiola but it has decreased significantly since imaging prior to start of immunotherapy.   -8/12/20- CT c/a/p- No new suspicious nodule, mass or consolidation. Spiculated opacity adjacent to the horizontal fissure in the right upper lobe is stable.  Right hilar ly the stool    • Chronic cough 5/8/2015   • Cigarette smoker 5/8/2015    50 pack year h/o smoking    • Constipation    • Diarrhea, unspecified    • Easy bruising January 2019   • Elevated liver function tests 7/24/2013   • Family history of bladder cancer 9/ tablet (40 mg total) by mouth daily. , Disp: 60 tablet, Rfl: 2  carvedilol 12.5 MG Oral Tab, Take 1 tablet (12.5 mg total) by mouth 2 (two) times daily with meals. , Disp: 180 tablet, Rfl: 3  furosemide 40 MG Oral Tab, Take 1 tablet (40 mg total) by mouth 2 102.5 kg (226 lb)  09/11/20 : 107.4 kg (236 lb 12.8 oz)  09/08/20 : 103.4 kg (228 lb)  09/02/20 : 98.7 kg (217 lb 8 oz)  08/24/20 : 105.2 kg (232 lb)    ECOG PS: 1    Physical Examination:  General: Patient is alert and oriented, not in acute distress  Psy 34.4 09/02/2020    INR 1.55 (H) 09/18/2020     Lab Results   Component Value Date    AFPTM 18.5 (H) 09/18/2020    AFPTM 36.3 (H) 08/22/2020    AFPTM 24.8 (H) 08/14/2020    AFPTM 27.9 (H) 07/17/2020    AFPTM 26.2 (H) 06/17/2020    AFPTM 23.8 (H) 05/20/2020 25 (H) 12/17/2018     Lab Results   Component Value Date    CRP 0.70 (H) 09/02/2020    CRP <0.29 06/07/2019    CRP <0.29 12/17/2018     Lab Results   Component Value Date     06/07/2019     12/17/2018     Lab Results   Component Value Date likely the cause of his GI sx. Improving on steroids. Colonoscopy 9/17 shows some patchy erythema and edema which is likely some residual colitis as well.    -Continue steroid taper, 80 mg daily for now, starting 9/21 can reduce to 60 mg daily.   Plan for afternoon. Limit smoking particularly later in the day. Avoid a lot of physical exercise just before bed. Avoid TV in bedroom. *Right inguinal hernia  -Is reducible, likely bothering him more lately due to more fluid retention related to steroids.

## 2020-09-24 NOTE — TELEPHONE ENCOUNTER
[facility] [address] at [phone] ext [phone] patient discharges recently from [address] [place] [address] and have a  available.

## 2020-09-25 PROBLEM — R79.89 AZOTEMIA: Status: ACTIVE | Noted: 2020-01-01

## 2020-09-25 PROBLEM — L03.116 CELLULITIS OF LEFT LOWER EXTREMITY: Status: ACTIVE | Noted: 2020-01-01

## 2020-09-25 PROBLEM — K70.30 ALCOHOLIC CIRRHOSIS OF LIVER WITHOUT ASCITES (HCC): Status: ACTIVE | Noted: 2020-01-01

## 2020-09-25 PROBLEM — D64.9 ANEMIA: Status: ACTIVE | Noted: 2020-01-01

## 2020-09-25 NOTE — PROGRESS NOTES
[facility] Note: Antimicrobial Weight Based Dose Adjustment for: cefazolin (ANCEF)    Obed Valdez is a 60year old patient who has been prescribed cefazolin (ANCEF) 1000 mg every 8 hours.     Estimated Creatinine Clearance: 149.4 mL/min (A) (based on

## 2020-09-25 NOTE — H&P
[facility]  History and Physical     Merry Gene Patient Status:  Inpatient    10/11/1960 MRN [id number]   [facility] 3NE-A Attending Kiran Savage DO   Hosp Day # 0 PCP Regina Fuentes DO     Chief Complaint: Left leg redne Weight loss         Past Surgical History:   Past Surgical History:   Procedure Laterality Date   • BIOPSY OF SKIN LESION      face and leg   • COLONOSCOPY N/A 9/17/2020    Performed by Charles Salazar MD at [facility] ENDOSCOPY   • ESOPHAGOGASTRODUODENOSCOPY (E times daily. , Disp: 60 tablet, Rfl: 6    •  Multiple Vitamins-Minerals (EQL ONE DAILY MENS 50+ ADVANCE) Oral Tab, Take 1 tablet by mouth daily. , Disp: , Rfl:         Review of Systems:   A comprehensive 14 point review of systems was completed.     Pertin immunocompromised status  4. Wound care eval   2. Pancytopenia  1. Counts near baseline  2. Monitor   3. Oncology on consult   3. Recent Immunotherapy related enteritis/colitis  1. Continue steroids started PTA  4. B/L MARISA edema  1.  Likely secondary to cir

## 2020-09-25 NOTE — ED INITIAL ASSESSMENT (HPI)
States was diagnosed of hepato liver CA x 1.5 yrs. Currently on chemo/immunotherapy. Bilateral leg swelling and weeping/draining with fluids. States he was admitted 2 weeks ago for GI issues. On  Prednisone. No fever/cough.

## 2020-09-25 NOTE — PROGRESS NOTES
Hematology/Oncology Clinic Follow Up Visit    Patient Name: Haleigh Birmingham Record Number: [id number]    YOB: 1960    PCP: Dr. Shashi Obando   Other providers: Dr. Adriana Crowder (liver)    Reason for Consultation:  Phi mendez nivolumab  -6/7/19- cycle 6 nivolumab  -6/21/19- cycle 7 nivolumab  -6/28/19- CT c/a/p- Significant decrease in the size of the RUL lobe spiculated lung mass (21 x 23mm -> 11 x 14mm).   Decrease in size of heterogeneously enhancing mass in segment 5 of the significant radiographic differences noted despite dramatic response based on AFP levels and clear clinical improvement, therefore consensus is that his malignancy is not evaluable by imaging**  -1/13/20- CT chest- Relatively stable spiculated opacity abiola is unchanged compared to most recent CT chest but it has decreased significantly since imaging prior to start of immunotherapy.   -8/12/20- CT c/a/p- No new suspicious nodule, mass or consolidation.  Spiculated opacity adjacent to the horizontal fissure in 8/22/2017   • Indigestion    • Leukocytopenia 9/23/2014   • Loss of appetite    • Morbid obesity with BMI of 40.0-44.9, adult ([facility] 9/23/2014   • Nausea August 2020   • Personal history of antineoplastic chemotherapy    • Primary lung squamous cell carcino Take 1 tablet (40 mg total) by mouth 2 (two) times daily. , Disp: 60 tablet, Rfl: 6    •  Multiple Vitamins-Minerals (EQL ONE DAILY MENS 50+ ADVANCE) Oral Tab, Take 1 tablet by mouth daily.   , Disp: , Rfl:       Allergies:     Penicillins             HIVES murmurs  Respiratory: Lungs clear to auscultation bilaterally  GI/Abd: nontender, no palpable abnormalities. No significant ascites. Normal bowel sounds. No hepatosplenomegaly.   +Reducible right inguinal hernia  Neurological: Grossly intact    Lymphatics (H) 06/17/2020    AFPTM 23.8 (H) 05/20/2020    AFPTM 22.0 (H) 04/22/2020    AFPTM 20.2 (H) 03/27/2020    AFPTM 22.9 (H) 02/28/2020    AFPTM 19.9 (H) 01/31/2020    AFPTM 19.7 (H) 01/17/2020    AFPTM 19.9 (H) 01/03/2020    AFPTM 23.5 (H) 12/20/2019    AFPTM 12/17/2018     Lab Results   Component Value Date    HAPT <7.8 (L) 06/07/2019    HAPT 7.8 (L) 12/17/2018     No results found for: [facility] [place]  Lab Results   Component Value Date    KAPPALTCHN 3.633 (H) 12/17/2018      Lab Results   Component Value Date    LA felt to be consistent with immunotherapy mediated duodenitis and therefore is likely the cause of his GI sx.  Clinically improving on steroids.   Colonoscopy 9/17 shows some patchy erythema and edema which is likely some residual colitis as well.    -steroi likely bothering him more lately due to more fluid retention related to steroids. Anticipate this to be gradually improving as steroids are weaned and fluid overload is improved. *Hypokalemia  -improved today. Pt brought to ED for admission.   Will

## 2020-09-25 NOTE — ED PROVIDER NOTES
Patient Seen in: THE [facility] [place]      History   Patient presents with:  Cellulitis    Stated Complaint: L leg infection    HPI    71-year-old male complaining of left leg swelling.   This patient has a history of cirrhosis and he • Unspecified essential hypertension    • Visual impairment     glasses   • Vomiting    • Wears glasses 1980   • Weight loss               Past Surgical History:   Procedure Laterality Date   • BIOPSY OF SKIN LESION      face and leg   • COLONOSCOPY N/A 9/ Lungs are clear to auscultation  Cardiovascular exam shows regular rate and rhythm without murmurs. Abdomen is soft and nontender.   Extremities the lower extremities there is 3+ pitting edema bilaterally the left lower leg the lower two thirds there is ex CONCLUSION:  1. No DVT bilaterally. Moderate bilateral lower extremity edema. Bilateral popliteal cysts as described above.      Dictated by (CST): Demetri Greco MD on 9/25/2020 at 10:54 AM     Finalized by (CST): Demetri Greco MD on 9/25/2020 a Present on Admission  Date Reviewed: 9/18/2020          ICD-10-CM Noted POA    Anemia D64.9 9/25/2020 Yes    Azotemia R79.89 9/25/2020 Yes    Cellulitis of left lower extremity L03.116 9/25/2020 Unknown

## 2020-09-25 NOTE — PLAN OF CARE
Assumed patient from the ER. Contact and droplet precautions waiting for PCR to come back: patient is from home. A/O x4. Room air. SB on tele. Voids in bathroom with standby assist. Pain noted in bilateral legs gave medications per MAR. Regular diet.  Left

## 2020-09-26 NOTE — CONSULTS
INFECTIOUS DISEASE CONSULT NOTE    Melvina Rodriguez Patient Status:  Inpatient    10/11/1960 MRN [id number]   [facility] 3NE-A Attending Brennon Mora, [address] [facility] Day # 1 PCP Caitlin Campbell face and leg   • COLONOSCOPY N/A 9/17/2020    Performed by Grey Cox MD at [facility] ENDOSCOPY   • ESOPHAGOGASTRODUODENOSCOPY (EGD) N/A 9/17/2020    Performed by Grey Cox MD at [facility] ENDOSCOPY   • ESOPHAGOGASTRODUODENOSCOPY (EGD) N/A 8/23/2020    Pe 44, temperature 98.3 °F (36.8 °C), temperature source Oral, resp. rate 18, height 5' 11\" (1.803 m), weight 236 lb (107 kg), SpO2 95 %. HEENT: Moist mucous membranes. Extraocular muscles are intact. Neck: No lymphadenopathy. No JVD. No carotid bruits. pain     Rib pain on left side     Coagulopathy (HCC)     Melena     Odynophagia     Hyperammonemia (HCC)     Hyponatremia     Hyperglycemia     Abdominal pain, acute     Alcoholic cirrhosis of liver without ascites (HCC)     Melanotic stools     Gastric o

## 2020-09-26 NOTE — PROGRESS NOTES
[facility]  Progress Note     Manolo Pena Patient Status:  Inpatient    10/11/1960 MRN [id number]   [facility] 3NE-A Attending Latonya Ocampo, [address] Day # 1 PCP Mello Harris DO     Chief Complaint: left leg redness/pain/s for input(s): TROP, CK in the last 168 hours. Imaging: Imaging data reviewed in Epic.     Medications:   • clindamycin  600 mg Intravenous Q8H   • Dicyclomine HCl  10 mg Oral TID AC and HS   • folic acid  1 mg Oral Daily   • Pantoprazole Sodium  40

## 2020-09-26 NOTE — CONSULTS
Hematology/Oncology Consult Note    Patient Name: Rowena Castellanos  Medical Record Number: [id number]    YOB: 1960    Consulting Physician: Monika Marshall M.D.   Requesting Physician: Danielle Baca DO    Reason for Consultation:    Jacquiline Hatchet nivolumab  -6/21/19- cycle 7 nivolumab  -6/28/19- CT c/a/p- Significant decrease in the size of the RUL lobe spiculated lung mass (21 x 23mm -> 11 x 14mm).   Decrease in size of heterogeneously enhancing mass in segment 5 of the liver compared to the previo differences noted despite dramatic response based on AFP levels and clear clinical improvement, therefore consensus is that his malignancy is not evaluable by imaging**  -1/13/20- CT chest- Relatively stable spiculated opacity along the right minor fissure most recent CT chest but it has decreased significantly since imaging prior to start of immunotherapy.   -8/12/20- CT c/a/p- No new suspicious nodule, mass or consolidation.  Spiculated opacity adjacent to the horizontal fissure in the right upper lobe is s Wears glasses 1980   • Weight loss      Past Surgical History:   Procedure Laterality Date   • BIOPSY OF SKIN LESION      face and leg   • COLONOSCOPY N/A 9/17/2020    Performed by Nellie Neely MD at [facility] ENDOSCOPY   • ESOPHAGOGASTRODUODENOSCOPY (EGD) N specialist- fixes machines, HVAC and plumbing, maintenance.      Social History    Tobacco Use      Smoking status: Former Smoker        Packs/day: 1.00        Years: 45.00        Pack years: 45        Types: Cigarettes        Quit date: 2/20/2019        Renaldo Márquez NAD.  HEENT: Anicteric sclera. Neck: No JVD. Lungs: Normal effort. Cardiac: RRR. Abdomen: Soft. Ext: 3+ symmetric BLE edema with associated bullous formation with clear serous fluid on R foot.   LLE has marked erythema extending from toes to near [facility] 1.50 - 7.70 x10 (3) uL    Neutrophil Absolute 2.30 1.50 - 7.70 x10(3) uL    Lymphocyte Absolute 0.35 (L) 1.00 - 4.00 x10(3) uL    Monocyte Absolute 0.30 0.10 - 1.00 x10(3) uL    Eosinophil Absolute 0.02 0.00 - 0.70 x10(3) uL    Basophil Absolute 0.00 0.00 states he has swelling, pain and redness of left leg, pain and swelling of right leg. FINDINGS:    SAPHENOFEMORAL JUNCTION:  No reflux. THROMBI:  None visible. COMPRESSION:  Normal compressibility, phasicity, and augmentation.   OTHER:  There is a primarily assessed clinically and with serial AFP monitoring. Hold nivolumab for at least 6 weeks until completes steroid taper. *NSCLC, squamous cell type. R hilar lymphadenopathy  -initial clinical stage T1N0. Good response to nivolumab on CT.    -

## 2020-09-26 NOTE — PLAN OF CARE
Resumed care at 1930  Pt A&Ox4 calm and cooperative   Vs wnl, RA, SB on tele  Up with SBA  BLE red and swollen, weeping  Left lower extremity worsening redness with abrasion on top of foot, nonadhesive pad placed with abd and kerlix to protect from further FALL  Goal: Free from fall injury  Description: INTERVENTIONS:  - Assess pt frequently for physical needs  - Identify cognitive and physical deficits and behaviors that affect risk of falls.   - [place] fall precautions as indicated by assessment.  - Educ

## 2020-09-26 NOTE — CDS QUERY
Hematology Results  Yamila Atkinson  Dear Doctor:  Clinical information (provided below) includes documentation of hematology results that are less than the normal range.  For accurate ICD-10-CM code assignment to reflect severity

## 2020-09-27 NOTE — PROGRESS NOTES
NURSING DISCHARGE NOTE    Discharged Home via [facility]. Accompanied by Spouse  Belongings Returned to patient from safe. Discharge paperwork and prescriptions provided and explained. All questions and concerns addressed.

## 2020-09-27 NOTE — PROGRESS NOTES
INFECTIOUS DISEASE PROGRESS NOTE    Samreen Talamantes Patient Status:  Inpatient    10/11/1960 MRN [id number]   [facility] 3NE-A Attending Hanane Marks, [address] [facility] Day # 2 PCP Kendra Lawler bruits. Respiratory: Clear to auscultation bilaterally. No wheezes. No rhonchi. Cardiovascular: S1, S2.  Regular rate and rhythm. No murmurs. Abdomen: Soft, nontender, nondistended. Positive bowel sounds.   Musculoskeletal: Full range of motion of all RUQ abdominal pain     Rib pain on left side     Coagulopathy (HCC)     Melena     Odynophagia     Hyperammonemia (HCC)     Hyponatremia     Hyperglycemia     Abdominal pain, acute     Alcoholic cirrhosis of liver without ascites (HCC)     Melanotic stools

## 2020-09-27 NOTE — PLAN OF CARE
Assumed care at 0730, A/Ox4, R/A, NSR:SB on tele. Up ad carmen to void. IV ancef and clindamycin for left lower leg cellulitis. Tramadol for pain w/positive effect. Reg diet. Wound care performed early this morning. Will continue to monitor.    Problem: Fredis

## 2020-09-27 NOTE — PLAN OF CARE
Assumed care at [address] [place] [address]. Pt is A&O x4. On RA.   NSR, SB on tele, HR in the 50. BLE +2, LLE more red and swollen in comparison to RLE. Dressings C/D/I with moderate serosanguinous drainage. IV abx ancef and clindamycin. Regular diet.    ID and heme/o

## 2020-09-27 NOTE — PROGRESS NOTES
[facility]  Progress Note     Melvina Rodriguez Patient Status:  Inpatient    10/11/1960 MRN [id number]   [facility] [place] 3NE-A Attending Brennon Mora, [address] [facility] Day # 2 PCP America Hartman DO     Chief Complaint: left leg redness/pain/s on SCr of 0.7 mg/dL). Recent Labs   Lab 09/25/20  0925   PTP 17.2*   INR 1.42*       No results for input(s): TROP, CK in the last 168 hours. Imaging: Imaging data reviewed in Epic.     Medications:   • clindamycin  600 mg Intravenous Q8H   • Dic

## 2020-09-27 NOTE — PLAN OF CARE
Vital signs stable. Patient denies nausea or shortness of breath today. Pain managed with PRN medications, patient agreeable to trying oral pain medication for management. Dressing changed this afternoon, wound care to see. Tolerating activity.   IV ant

## 2020-09-28 NOTE — TELEPHONE ENCOUNTER
Called and spoke with pt he is feeling well since being home from hospital. He states he is taking his abx, lasix 40 mg BID, spironolactone 50 mg daily. Instructed pt to start taking prednisone 40 mg daily starting today.  he is also taking tramadol as need

## 2020-09-28 NOTE — DISCHARGE SUMMARY
[facility] HOSPITAL[facility]  DISCHARGE SUMMARY     Jules Sánchez Patient Status:  Inpatient    10/11/1960 MRN [id number]   [facility] 3NE-A Attending No att. providers found   Hosp Day # 2 PCP Bob Hahn DO     Date of Admission: 2020 · None    Consultants:  • ID  • Oncology    Discharge Medication List:     Discharge Medications      START taking these medications      Instructions Prescription details   cephALEXin 500 MG Caps  Commonly known as: KEFLEX      Take 1 capsule (500 mg to #1170 - [facility] - [address] [phone], [phone] [address] [facility], [facility] [id number]    Phone: [phone]   · traMADol HCl 50 MG Tabs     Please  your prescriptions at the location directed by your doctor or nurse    Bring a paper pre

## 2020-09-28 NOTE — PAYOR COMM NOTE
Appropriate for inpatient status per guidelines for swelling, redness and weeping to legs due to cellulitis in a patient with [address] in an immunocompromised state being treated with immunotherapy.        --------------  ADMISSION REVIEW     Payor: [facility] • Morbid obesity with BMI of 40.0-44.9, adult ([facility] 9/23/2014   • Nausea August 2020   • Personal history of antineoplastic chemotherapy    • Primary lung squamous cell carcinoma, right ([facility] 3/14/2019   • Severe obesity (BMI 35.0-39. 9) 8/22/2017   • Sympt Extremities the lower extremities there is 3+ pitting edema bilaterally the left lower leg the lower two thirds there is extensive reddish purple discoloration with some weeping wounds on the dorsal lateral aspect of the foot there is clear discharge no pu Result Date: 9/25/2020  CONCLUSION:  No active cardiopulmonary process identified.     Dictated by (CST): Timbo Juan MD on 9/25/2020 at 11:00 AM     Finalized by (CST): Timbo Juan MD on 9/25/2020 at 11:00 AM     Lactic acid was normal  Hemoglobin Integument: No rashes or lesions. Psychiatric: Appropriate mood and affect.       Diagnostic Data:      Labs:  Recent Labs   Lab 09/25/20 0826 09/25/20 0925   WBC 3.9* 3.6*   HGB 11.2* 11.8*   .6* 102.1*   PLT 40.0* 39.0*   INR  --  1.42*       R 3. Oncology following   3. Recent Immunotherapy related enteritis/colitis  1. Continue steroids started PTA  4. B/L MARISA edema  1. Likely secondary to cirrhosis and hypoalbuminemia   2. IV diuresis as BP tolerates   5. Hyperglycemia  1. A1c 5.9  6.  Sheila Eisenmenger -repeat CT chest due mid November     *anemia, leukopenia, thrombocytopenia  -due to liver dysfunction with some low grade DIC, splenomegaly, HCV, malignancy, folate def  -continue 1mg folic acid daily.            9/27 INF DIS     1.  Acute LLE celluitis- s 1. D/c BB given normal BP and bradycardia  11. Disposition  1.  D/c planning                BLOOD CULTURE  Order: [id number]  Collected:  9/25/2020 09:26 Status:  Preliminary result  Specimen Information: Blood,peripheral        BLOOD CULTURE RESULT No Growt No

## 2020-09-29 NOTE — TELEPHONE ENCOUNTER
Lee Ely, I will be back in the office on Wednesday, 9/30/2020 to discuss where we can fit this patient in for his TCM visit.

## 2020-09-29 NOTE — TELEPHONE ENCOUNTER
Patient discharged from [facility] and is at [facility] risk for readmission and recommended to have a TCM HFU by 10/4/2020 preferred or no later than 10/11/2020 or sooner.  [facility] attempted to schedule a TCM HFU, patient states he will call Dr. Leora Reese himself

## 2020-09-29 NOTE — PROGRESS NOTES
Initial Post Discharge Follow Up   Discharge Date: 9/27/20  Contact Date: 9/29/2020    Consent Verification:  Assessment Completed With: Patient  HIPAA Verified? Yes    Discharge Dx:     1. LLE cellulitis  1.  Clinically improving   2. Empiric antibioti FADI informed patient that the nurse from Dr. Ion Luo office will call him back. Patient states that he just finished eating his lunch and needs to go back upstairs and elevate his feet and take a pain pill, because his left foot is throbbing.  Patient rep eating his regular diet. Medications:   Current Outpatient Medications   Medication Sig Dispense Refill   • traMADol HCl 50 MG Oral Tab Take 1 tablet (50 mg total) by mouth every 6 (six) hours as needed.  15 tablet 0   • cephALEXin 500 MG Oral Cap Take as prescribed? No    Referrals/orders at D/C:  Home Health/Services ordered at D/C? No     DME ordered at D/C? No    Needs post D/C:   Now that you are home, are there any needs or concerns you need addressed before your next visit with your PCP?  (DME, me Advised patient to bring all medications and blood glucose meter/supplies if applicable.

## 2020-09-29 NOTE — PROGRESS NOTES
NCM placed call to patient for TCM, LM requesting a call to [phone] back with a condition update.

## 2020-10-01 PROBLEM — L08.9 FOOT INFECTION: Status: ACTIVE | Noted: 2020-01-01

## 2020-10-01 NOTE — H&P
[facility]  History and Physical     Virginia Evy Patient Status:  Emergency    10/11/1960 MRN [id number]   Location [address] Attending Bessy Thomas MD   Hosp Day # 0 PCP Jordy Espinal DO     Chief Complaint Unspecified essential hypertension    • Visual impairment     glasses   • Vomiting    • Wears glasses 1980   • Weight loss         Past Surgical History:   Past Surgical History:   Procedure Laterality Date   • BIOPSY OF SKIN LESION      face and leg   • C Tab, Take 1 mg by mouth daily. , Disp: , Rfl:     •  Pantoprazole Sodium 40 MG Oral Tab EC, Take 1 tablet (40 mg total) by mouth daily. , Disp: 60 tablet, Rfl: 2    •  furosemide 40 MG Oral Tab, Take 1 tablet (40 mg total) by mouth 2 (two) times daily. , Disp 0.52* 0.70 0.68*   GFRAA 134 119 120   GFRNAA 116 103 104   CA 8.5 8.0* 8.5   ALB 2.1* 2.1* 2.5*    133* 133*   K 4.3 3.9 3.7    103 102   CO2 25.0 27.0 26.0   ALKPHO 151* 156* 235*   AST 30 34 52*   ALT 49 44 58   BILT 3.4* 3.5* 3.5*   TP 5.9*

## 2020-10-01 NOTE — ED INITIAL ASSESSMENT (HPI)
Pt sent by pmd for admission for antibiotics for wound to l foot pt presents with obvious roldan and complaint of severe pain to l foot

## 2020-10-01 NOTE — PLAN OF CARE
Received report from ED. Pt arrived to unit A&O x4. VSS, tele NSR,  RA  L foot wound leaking. Kerlix changed w/ gauze.    IV cefepime in ED, Vanco finished on floor   Will continue to monitor     Problem: Patient/Family Goals  Goal: Patient/Family Long by assessment.  - Educate pt/family on patient safety including physical limitations  - Instruct pt to call for assistance with activity based on assessment  - Modify environment to reduce risk of injury  - Provide assistive devices as appropriate  - Consi

## 2020-10-01 NOTE — CONSULTS
INFECTIOUS DISEASE CONSULTATION    Terra Son Patient Status:  Inpatient    10/11/1960 MRN [id number]   [facility] 7NE-A Attending Shantanu Khanna, [address] [facility] Day # 0 PCP Dorene Osuna Surgical History:   Procedure Laterality Date   • BIOPSY OF SKIN LESION      face and leg   • COLONOSCOPY N/A 9/17/2020    Performed by Juancarlos Mena MD at [facility] ENDOSCOPY   • ESOPHAGOGASTRODUODENOSCOPY (EGD) N/A 9/17/2020    Performed by Juancarlos Mena 40 mg, 40 mg, Oral, BID  •  Pantoprazole Sodium (PROTONIX) EC tab 40 mg, 40 mg, Oral, Daily  •  [START ON 10/2/2020] predniSONE (DELTASONE) tab 20 mg, 20 mg, Oral, Daily  •  spironolactone (ALDACTONE) tab 50 mg, 50 mg, Oral, Daily  •  traMADol HCl (ULTRAM) rhythm. No murmurs.   Abdomen: Soft, nontender, nondistended  Musculoskeletal: Left foot, blister dorsum yellow  Soft tissue redness, exquisitely tender,multiple areas raised  malodor      Laboratory Data:  Laboratory data reviewed      Recent Labs   Lab 1 Alcoholic cirrhosis of liver without ascites (HCC)     Melanotic stools     Gastric out let obstruction     Hypokalemia     Acute kidney injury ([address]     Metabolic acidosis     Neoplastic malignant related fatigue     Lower GI bleeding     Ascites due to a

## 2020-10-01 NOTE — ED PROVIDER NOTES
Patient Seen in: [facility] Emergency Department      History   Patient presents with:  Rash Skin Problem    Stated Complaint: left foot gangrenous    HPI    79-year-old male with history of lung cancer, alcoholic cirrhosis presents to the ER after Uncomfortable fullness after meals    • Unspecified essential hypertension    • Visual impairment     glasses   • Vomiting    • Wears glasses 1980   • Weight loss               Past Surgical History:   Procedure Laterality Date   • BIOPSY OF SKIN LESION entire foot is significantly tender on palpation. No palpable crepitus.   Neuro: Alert oriented and nonfocal   Skin: no rashes or nodules    ED Course     Labs Reviewed   COMP METABOLIC PANEL (14) - Abnormal; Notable for the following components:       Res retention due to his alcoholic cirrhosis I have adjusted for ideal body weight. There is no leukocytosis. He is afebrile.   Admission disposition: 10/1/2020  1:22 PM                   Disposition and Plan     Clinical Impression:  Foot infection  (primary

## 2020-10-02 NOTE — PAYOR COMM NOTE
--------------  ADMISSION REVIEW     Payor: [address] #:  [id number]  Authorization Number: [id number]    Admit date: 10/1/20  Admit time: 0       Admitting Physician: Nidhi Li DO  Attending Physician: • Fatigue    • Flatulence/gas pain/belching    •     • Hepatocellular carcinoma ([facility] 3/20/2019   • Hypoalbuminemia due to protein-calorie malnutrition ([facility] 8/22/2017   • Indigestion    • Leukocytopenia 9/23/2014   • Loss of appetite    •     • Nausea Aug Lungs: good air exchange and clear   Heart: regular rate rhythm and no murmur   Abdomen: Soft and nontender. No abdominal masses. No peritoneal signs   Extremities: 3+ pitting edema on bilateral lower extremities.   The left lower extremity is erythematou LACTIC ACID 3 HR POST POSITIVE   BLOOD CULTURE   BLOOD CULTURE                  MDM      27-year-old male presents with a infection of the left foot. Recent hospitalization for the same but symptoms are worsening. Sent in by infectious disease.   Shelly History of Present Illness: Asad Napier is a 60year old male with HCV, HCC, NSCLC (c/b recent immunotherapy related enteritis/colitis),  cirrhosis, and HTN who presented at advice of his ID physician d/t concern for worsening LLE cellulitis.  Recent ad • ESOPHAGOGASTRODUODENOSCOPY (EGD) N/A 8/23/2020    Performed by Tilmon Bence, MD at [facility] ENDOSCOPY   • IR VARICOSE VEIN ENDOVENOUS LASER ABLATION(CPT=36478)  2018   • NEEDLE BIOPSY LIVER  August 2020       Social History:  He reports that he does not /78   Pulse 64   Temp 97.5 °F (36.4 °C) (Tympanic)   Resp 26   Wt 240 lb (108.9 kg)   SpO2 100%   BMI 33.47 kg/m²   General: No acute distress. Alert and oriented x 3. HEENT: Normocephalic atraumatic. Moist mucous membranes. EOM-I. PERRLA.  Anicteric 1. LLE cellulitis with failure of PO cephalexin  1. Follow cultures   2. Wound care consulted  3. Empiric meropenem  4. Considering xray, MRI to r/o necrotizing infection/osteo  5. ID consulted  6. Consult podiatry  2. Lactic acidosis  1. IVF, trend  3.  Hy 10/1/2020 2136 Given 20 mL Intravenous (Left Antecubital) Monserrat Stein      Meropenem (MERREM) 500 mg in sodium chloride 0.9% 100 mL MBP     Date Action Dose Route User    10/2/2020 0527 New Bag 500 mg Intravenous Chantal Chavarria RN    10/1/2020 2 INFECTIOUS DISEASE CONSULTATION           Jen Gordon Patient Status:  Inpatient    10/11/1960 MRN [id number]   [facility] 7NE-A Attendi   face and leg   • COLONOSCOPY N/A 9/17/2020     Performed by Ephraim Eng MD at [address] [place] [address] ENDOSCOPY   • ESOPHAGOGASTRODUODENOSCOPY (EGD) N/A 9/17/2020     Performed by Ephraim Eng MD at [address] [place] [address] ENDOSCOPY   • ESOPHAGOGASTRODUODENOSCOPY (EGD) N/A 8/23/2020 •  traMADol HCl (ULTRAM) tab 50 mg, 50 mg, Oral, Q6H PRN  No current facility-administered medications on file prior to encounter.      •  predniSONE 20 MG Oral Tab, Take 40 mg by mouth daily.  For 7 days, Disp: , Rfl:      •  cephALEXin 500 MG Oral Cap, Ta Laboratory data reviewed            Recent Labs   Lab 10/01/20  1205   RBC 3.64*   HGB 12.7*   HCT 36.2*   MCV 99.5   MCH 34.9*   MCHC 35.1   RDW 15.9*   NEPRELIM 3.64   WBC 4.6   PLT 52.0*               Recent Labs   Lab 09/26/20  0840 09/27/20  0836 10/0 Anemia     Azotemia     Cellulitis of left lower extremity     Colitis     Foot infection        ASSESSMENT/PLAN:  1.  Necrotizing soft tissue infection left foot\  -orange blister, underlying hyperbilrubinemia  immunosuppressed on chronic prednisone  Po Physical Exam:    General: No acute distress. Respiratory: Clear to auscultation bilaterally. No wheezes. No rhonchi. Cardiovascular: S1, S2. Regular rate and rhythm. No murmurs, rubs or gallops. Abdomen: Soft, nontender, nondistended.   Positive bowel 1. Severe LLE cellulitis with failure of outpt PO cephalexin  1. Follow cultures (NGTD)  2. Wound care consulted  3.  Empiric meropenem  4. 10/1 MRI w/ severe cellulitis, possible emphysema over medial malleolus, no e/o osteomyelitis  5. 10/2 L foot xray w/

## 2020-10-02 NOTE — ANESTHESIA PROCEDURE NOTES
Airway  Urgency: elective      General Information and Staff    Patient location during procedure: OR  Anesthesiologist: Breana Savage MD  Performed: anesthesiologist     Indications and Patient Condition  Indications for airway management: anesthesia

## 2020-10-02 NOTE — CONSULTS
Hematology/Oncology Initial Consultation Note    Patient Name: Haleigh Birmingham Record Number: [id number]    YOB: 1960   Date of Consultation: 10/2/2020   Physician requesting consultation: Dr. Isidro Pabon    Reason for Consultation: nivolumab  -6/7/19- cycle 6 nivolumab  -6/21/19- cycle 7 nivolumab  -6/28/19- CT c/a/p- Significant decrease in the size of the RUL lobe spiculated lung mass (21 x 23mm -> 11 x 14mm).   Decrease in size of heterogeneously enhancing mass in segment 5 of the significant radiographic differences noted despite dramatic response based on AFP levels and clear clinical improvement, therefore consensus is that his malignancy is not evaluable by imaging**  -1/13/20- CT chest- Relatively stable spiculated opacity abiola lung lesion is unchanged compared to most recent CT chest but it has decreased significantly since imaging prior to start of immunotherapy.   -8/12/20- CT c/a/p- No new suspicious nodule, mass or consolidation.  Spiculated opacity adjacent to the horizontal unspecified    • Easy bruising January 2019   • Elevated liver function tests 7/24/2013   • Family history of bladder cancer 9/23/2014   • Fatigue    • Flatulence/gas pain/belching    • Hepatitis    • Hepatocellular carcinoma ([facility] 3/20/2019   • Hypoalbumi Irene Barrera DO, Last Rate: 62.5 mL/hr at 09/03/20 1237, 40 mEq at 09/03/20 1237    •  [COMPLETED] iohexol (OMNIPAQUE) 350 MG/ML injection 100 mL, 100 mL, Intravenous, ONCE PRN, Lazara Taveras MD, 100 mL at 09/02/20 1452    •  [COMPLETED] OCTREOTIDE hydroxide, bisacodyl, Fleet Enema, zolpidem, traMADol HCl    Allergies:     Penicillins             HIVES    Psychosocial History:  Social History    Social History Narrative      , lives with his wife. No children.   Works as a technician specialis hepatosplenomegaly. Neurological: Grossly intact    Lymphatics: No palpable lymphadenopathy  Skin: No rashes or petechiae  Ext: 1+ RLE edema, 2+ LLE edema with dressing covering wound over dorsal foot with drainage.   Dressing not removed for complete exam the foot consistent with cellulitis. There is focal edema with also curvilinear signal void along the medial malleolus. There does appear to be some susceptibility artifact here. Correlation   with plain radiographs is recommended.   This could be air in hepatocellular carcinoma, unresectable  -has been on nivolumab since 3/28/19 with an excellent response to therapy.  Unfortunately his liver malignancy is not reliably evaluated by any form of imaging- CT, PET/CT, or MRI, therefore have primarily assessed

## 2020-10-02 NOTE — ANESTHESIA PREPROCEDURE EVALUATION
PRE-OP EVALUATION    Patient Name: Asad Napier    Pre-op Diagnosis: INPT    Procedure(s):  EXCISION AND DRAINAGE LEFT FOOT    Surgeon(s) and Role:     * Shonda Partida DPM - Primary    Pre-op vitals reviewed.   Temp: 98.2 °F (36.8 °C)  Pulse: 66  R •  traMADol HCl (ULTRAM) tab 50 mg, 50 mg, Oral, Q6H PRN    •  Meropenem (MERREM) 500 mg in sodium chloride 0.9% 100 mL MBP, 500 mg, Intravenous, Q8H    •  carvedilol (COREG) tab 12.5 mg, 12.5 mg, Oral, Daily Beta Blocker    •  [COMPLETED] Gadoterate Meglu Procedure Laterality Date   • BIOPSY OF SKIN LESION      face and leg   • COLONOSCOPY N/A 9/17/2020    Performed by Christel Dai MD at [facility] ENDOSCOPY   • ESOPHAGOGASTRODUODENOSCOPY (EGD) N/A 9/17/2020    Performed by Christel Dai MD at [facility] ENDOSCOPY Pulmonary exam normal.  Breath sounds clear to auscultation bilaterally. Other findings            ASA: 3   Plan: general  NPO status verified and patient meets guidelines.     Post-procedure pain management plan discussed with surgeon and pat

## 2020-10-02 NOTE — PLAN OF CARE
Assumed care at 0730. Pt A&O x4. VSS. Tele NSR, , RA  Pt to have procedure on foot tonight w/ podiatry. Foot had no significant change during shift. Still orange color w/ black lining. Weeping. Dressings changed multiple times w/ chux pad underneath. to assist with strengthening/mobility  - Encourage toileting schedule  Outcome: Progressing     Problem: DISCHARGE PLANNING  Goal: Discharge to home or other facility with appropriate resources  Description: INTERVENTIONS:  - Identify barriers to discharge

## 2020-10-02 NOTE — PROGRESS NOTES
[facility] [place] [facility]  Progress Note     Asad Napier Patient Status:  Inpatient    10/11/1960 MRN [id number]   [facility] 7NE-A Attending Rajinder Mayes DO   Hosp Day # 1 PCP Yolanda Knox DO     Chief Complaint: left foot pain GFRAA 134 119 120 125   GFRNAA 116 103 104 108   CA 8.5 8.0* 8.5 7.9*   ALB 2.1* 2.1* 2.5*  --     133* 133* 135*   K 4.3 3.9 3.7 4.1    103 102 105   CO2 25.0 27.0 26.0 27.0   ALKPHO 151* 156* 235*  --    AST 30 34 52*  --    ALT 49 44 58  - line: no  · If COVID testing is negative, may discontinue isolation: yes     Will the patient be referred to TCC on discharge?: no  Estimated date of discharge: 2 or more midnights  Discharge is dependent on: clinical state, ID recs  At this point Mr. Leary

## 2020-10-02 NOTE — ANESTHESIA PROCEDURE NOTES
[facility]  Performed by: Joellen Burt MD  Authorized by: Joellen Burt MD       General Information and Staff    Start Time:   Anesthesiologist: Joellen Burt MD  Performed by:   Anesthesiologist  Patient Location:  OR    Block Placement:

## 2020-10-02 NOTE — PLAN OF CARE
Assumed care. Patient went down for MRI of foot last noc. Xray of foot done this am. Patient NPO to awaiting plan for today (possible I&D). Patient verbalizes understanding of plan. Wound care to see left foot for dressing. Foot currently ORALIA on chux.  Claudia

## 2020-10-02 NOTE — CONSULTS
[facility]  Report of Inpatient Wound Care Consultation     [facility] Patient Status:  Inpatient    10/11/1960 MRN [id number]   [facility] 7NE-A Attending Kati Logan DO   Hosp Day # 1 PCP Ruth Fernandes DO     REAS ESOPHAGOGASTRODUODENOSCOPY (EGD) N/A 9/17/2020    Performed by Grey Cox MD at [facility] ENDOSCOPY   • ESOPHAGOGASTRODUODENOSCOPY (EGD) N/A 8/23/2020    Performed by Ruth Louis MD at [facility] ENDOSCOPY   • IR VARICOSE VEIN ENDOVENOUS LASER ABLATION(CPT for further assessment and intervention, RN stated they are consulted. RN aware of xeroform dressing placed, order in computer for daily. Durable Medical Equipment:  NA  Offloading/Footwear:  NA  Compression:  NA    Physical Therapy Wound Goals:  1.  Arin Sessions

## 2020-10-02 NOTE — CONSULTS
[facility]    Report of Consultation    Kirk Hollingsworth Patient Status:  Inpatient    10/11/1960 MRN [id number]   [facility] 7NE-A Attending Henry Portillo DO   Hosp Day # 1 PCP Jonatan Lopez DO     Date of Admission:  10/1/2 face and leg   • COLONOSCOPY N/A 9/17/2020    Performed by Margaret Everett MD at [facility] ENDOSCOPY   • ESOPHAGOGASTRODUODENOSCOPY (EGD) N/A 9/17/2020    Performed by Margaret Everett MD at [facility] ENDOSCOPY   • ESOPHAGOGASTRODUODENOSCOPY (EGD) N/A 8/23/2020    [employer]:  Musculoskeletal:negative, the patient is resting comfortably in bed currently he has peripheral edema in both legs left worse than the right because of cellulitis    Physical Exam:  Integument: The skin on the left foot is warm and dry has a larg ankle are otherwise intact.     Dictated by (CST): Constantin Gruber MD on 10/01/2020 at 10:15 PM     Finalized by (CST): Constantin Gruber MD on 10/01/2020 at 10:21 PM       Xr Chest Ap Portable  (cpt=71045)    Result Date: 9/25/2020  CONCLUSION:  No active cardio related fatigue     Lower GI bleeding     Ascites due to alcoholic cirrhosis (HCC)     Diarrhea     Non-small cell lung cancer (HCC)     Drug-induced enteritis     Anemia     Azotemia     Cellulitis of left lower extremity     Colitis     Foot infection

## 2020-10-02 NOTE — PROGRESS NOTES
[facility]                INFECTIOUS DISEASE PROGRESS NOTE    Samreen Talamantes Patient Status:  Inpatient    10/11/1960 MRN [id number]   [facility] 7NE-A Attending Amada Billingsley DO   Hosp Day # 1 PCP Sridevi Mackenzie DO 3.5*  --    TP 5.9* 5.4* 6.2*  --        No results found for: [facility] Encounter on 10/01/20   1.  BLOOD CULTURE     Status: None (Preliminary result)    Collection Time: 10/01/20 12:06 PM    Specimen: Blood,peripheral   Result Value Lactic acidosis     Enteritis        ASSESSMENT/PLAN:  1. Necrotizing soft tissue infection left foot  -NPO, podiatry evaluating for surgery today  Continue meropenem, update with cx    2.  Mcneil Primrose, MD [place]

## 2020-10-03 NOTE — ANESTHESIA POSTPROCEDURE EVALUATION
[facility]    Sonja Payer Patient Status:  Inpatient   Age/Gender 60year old male MRN [id number]   [facility] SURGERY Attending Julita Bearden, [address] Day # 1 PCP Toby Rodriguez DO       Anesthesia Post-op Note    Procedur

## 2020-10-03 NOTE — PROGRESS NOTES
Heme/Onc Progress Note - [facility]      Chief Complaint:    Follow up for evaluation and management of hepatocellular carcinoma and NSCLC, cellulitis, and thrombocytopenia. Interim History:      Patient had I&D of left foot infection yesterday.  There was a l or destructive bony process. No fracture or dislocation. Sizable plantar calcaneal spur measures 12 x 8 mm.    =====  CONCLUSION:  Soft tissue swelling present, nonspecific, could reflect cellulitis, edema, other inflammation. No acute bone abnormality.  No with clinical findings and plain radiographs is necessary. 2. There is no evidence of osteomyelitis. 3. Remaining ligamentous and tendinous structures in the foot and ankle are otherwise intact.       Impression:     *necrotizing soft tissue infection/cam

## 2020-10-03 NOTE — PROGRESS NOTES
Patient seen. D/w PAALICIA and agree with plan outlined below.          [facility]                INFECTIOUS DISEASE PROGRESS NOTE    Tift Blizzard Patient Status:  Inpatient    10/11/1960 MRN [id number]   [facility] 7NE-A Attending Olga Vargas, Recent Labs   Lab 09/27/20  0836 10/01/20  1205 10/02/20  0550 10/03/20  1244   * 102* 88 192*   BUN 15 12 13 14   CREATSERUM 0.70 0.68* 0.62* 0.79   GFRAA 119 120 125 113   GFRNAA 103 104 108 98   CA 8.0* 8.5 7.9* 8.1*   ALB 2.1* 2.5*  -- Hypoalbuminemia     Splenomegaly     Primary lung squamous cell carcinoma, right (HCC)     Hepatocellular carcinoma (HCC)     Leukopenia     Other constipation     Poorly controlled ascites     Other insomnia     Left foot pain     Alcohol abuse, in remiss

## 2020-10-03 NOTE — PLAN OF CARE
Assumed care at 0700. No acute issues. Dr. Valeria Salgado in early, changed dressing. Patient foot began to bleed once oob and in chair. Back to bed, Dr. Valeria Salgado notified: instructed to reinforce coban dressing. Foot elevated on pillow.  Dr. Hansa Kapoor notified as

## 2020-10-03 NOTE — PLAN OF CARE
Returned from PACU at 2100  L foot dressing with ace bandage in place  Large amount of blood soaking through ace wrap  Dr. iTto Hicks made aware, orders for CBC & reinforce dressing  Currently denies pain  L foot elevated on pillow, NWB L foot  IV abx infusin

## 2020-10-03 NOTE — BRIEF OP NOTE
Pre-Operative Diagnosis: I  Severe foot infection dorsal left foot with cellulitis     Post-Operative Diagnosis: Same     Procedure Performed:   Procedure(s):  Incision and drainage of foot infection left foot with debridement of necrosis, this was excisio

## 2020-10-03 NOTE — PROGRESS NOTES
[facility] [place] HOSPITALIST  Progress Note     Melvina Rodriguez Patient Status:  Inpatient    10/11/1960 MRN [id number]   [facility] 7NE-A Attending Addison Balderas DO   Hosp Day # 2 PCP America Hartman DO     Chief Complaint: left foot pain 2. 1* 2.5*  --    * 133* 135*   K 3.9 3.7 4.1    102 105   CO2 27.0 26.0 27.0   ALKPHO 156* 235*  --    AST 34 52*  --    ALT 44 58  --    BILT 3.5* 3.5*  --    TP 5.4* 6.2*  --        Estimated Creatinine Clearance: 134.9 mL/min (A) (based on S SCD's  · CODE status: FULL  · [place]on: no  · Central line: no  · If COVID testing is negative, may discontinue isolation: yes     Will the patient be referred to TCC on discharge?: no  Estimated date of discharge: 2 or more midnights  Discharge is dependent

## 2020-10-04 NOTE — PLAN OF CARE
Assumed care at [address] and oriented x4  IV abx infusing  L foot dressing c/d/I, will monitor closely  C/o 5/10 pain L foot, relief with tramadol  Plan is for wound vac on Monday  Discussed with patient  Call light in reach          Problem: Patient/Fa

## 2020-10-04 NOTE — PROGRESS NOTES
[facility] HOSPITALIST  Progress Note     Severiano Ching Patient Status:  Inpatient    10/11/1960 MRN [id number]   [facility] 7NE-A Attending Velma Mejias DO   Hosp Day # 3 PCP Zi Hastings DO     Chief Complaint: left foot pain * 133* 135* 131*   K 3.9 3.7 4.1 4.0    102 105 101   CO2 27.0 26.0 27.0 26.0   ALKPHO 156* 235*  --   --    AST 34 52*  --   --    ALT 44 58  --   --    BILT 3.5* 3.5*  --   --    TP 5.4* 6.2*  --   --        Estimated Creatinine Clearance: for possible surgery/debridement of LLE wound    Quality:  · DVT Prophylaxis: SCD's  · CODE status: FULL  · [place]: no  · Central line: no  · If COVID testing is negative, may discontinue isolation: yes     Will the patient be referred to TCC on discharge?:

## 2020-10-04 NOTE — PLAN OF CARE
Assumed care at 0730. A&O x4, tele NSR,  RA. No acute issues during shift. Foot dressing intact, no noticeable drainage. Pain is moderate, controlled with tramedol prn. Plan is for wound vac consult tomorrow. Will continue to monitor.      Pro falls.  - [place] fall precautions as indicated by assessment.  - Educate pt/family on patient safety including physical limitations  - Instruct pt to call for assistance with activity based on assessment  - Modify environment to reduce risk of injury  -

## 2020-10-04 NOTE — PHYSICAL THERAPY NOTE
PHYSICAL THERAPY QUICK EVALUATION - INPATIENT    Room Number: 5968/5364-V  Evaluation Date: 10/4/2020  Presenting Problem: foot infection s/p I&D  Physician Order: PT Eval and Treat    Problem List  Principal Problem:     Foot infection  Active Problems: COLONOSCOPY N/A 9/17/2020    Performed by Grey Cox MD at [facility] ENDOSCOPY   • ESOPHAGOGASTRODUODENOSCOPY (EGD) N/A 9/17/2020    Performed by Grey Cox MD at [facility] ENDOSCOPY   • ESOPHAGOGASTRODUODENOSCOPY (EGD) N/A 8/23/2020    Performed by Heriberto of the bed?: A Little   How much help from another person does the patient currently need. ..   -   Moving to and from a bed to a chair (including a wheelchair)?: A Little   -   Need to walk in hospital room?: A Little   -   Climbing 3-5 steps with a railin Patient discharged from Physical Therapy services. Please re-order if a new functional limitation presents during this admission. GOALS  Patient was able to achieve the following goals . ..     Patient was able to transfer At previous, functional level

## 2020-10-04 NOTE — OPERATIVE REPORT
[facility]    PATIENT'S NAME: Pedro Tolliver DEEP   ATTENDING PHYSICIAN: Edna Carey DO   OPERATING PHYSICIAN: Ginna Mckeon D.P.M.    PATIENT ACCOUNT#:   [de-identified]    LOCATION:  [address] [place] [address]  MEDICAL RECORD #:   [id number]       DATE OF BIRTH:  1 There were no additions, deletions, or concerns reported at this time. The local block performed by Anesthesia was performed first, then general anesthesia was induced.   The left foot was then prepped and draped using the usual aseptic technique and hemos applied. The patient tolerated the above anesthesia and procedure well, left the operating room with vital signs stable and the vascular status of his left foot intact to recovery room via cart.     Dictated By Kulwant Nair D.P.M.  d: 10/03/2020 13:38:

## 2020-10-04 NOTE — PROGRESS NOTES
Heme/Onc Progress Note - [facility]      Chief Complaint:    Follow up for evaluation and management of hepatocellular carcinoma and NSCLC, cellulitis, and thrombocytopenia. Interim History:      Patient had I&D of left foot infection saturday.  He has no blee also swelling seen medially and laterally on the frontal view in the foot. No gas collection, foreign body or destructive bony process. No fracture or dislocation.  Sizable plantar calcaneal spur measures 12 x 8 mm.    =====  CONCLUSION:  Soft tissue swelli bone.  This could be air in soft tissues or metal if there is been a previous surgery here. Correlation with clinical findings and plain radiographs is necessary. 2. There is no evidence of osteomyelitis.   3. Remaining ligamentous and tendinous structure

## 2020-10-05 NOTE — CM/SW NOTE
Pt is a 59 yo male admitted for a foot infection. Pt is a readmission. Pt live with his spouse Letykyle Chavis. Pt has a history of liver cirrhosis and cancer. PT is recommending Home; however, Dr Juventino Lew wants pt to go to [facility] for wound care.   Pt is not agreeab Medicare

## 2020-10-05 NOTE — DIETARY NOTE
[facility]   CLINICAL NUTRITION    Kris Galindo     Admitting diagnosis:  Foot infection [L08.9]    Ht:  5'11\"  Wt: 108.9 kg (240 lb). This is 139 % of IBW  Body mass index is 33.47 kg/m².   IBW: 78.2 kg    Labs/Meds reviewed    Diet: Orders Placed

## 2020-10-05 NOTE — PROGRESS NOTES
Hematology/Oncology Progress Note    Patient Name: Haleigh Birmingham Record Number: [id number]    YOB: 1960     Reason for Consultation:  Adarsh Kearns was seen today for the diagnosis of [address] NSCLC    Interval events: feeling well. *   < > 131* 134*  --  134*   K 3.7   < > 4.0 3.6 4.2 3.8      < > 101 102  --  103   CO2 26.0   < > 26.0 29.0  --  28.0   BUN 12   < > 14 12  --  12   CREATSERUM 0.68*   < > 0.79 0.60*  --  0.61*   GFRAA 120   < > 113 126  --  125   GFRNAA 1 the result of a previous surgical procedure with residual metal.  There does not appear to be osteomyelitis associated with this abnormality. The postcontrast images demonstrate confluent fluid signal subcutaneous tissues of the dorsum of the foot.   Ther completes steroid taper.   -monitor CBC, CMP, AFP, periodic TFTs     *Cirrhosis- due to HCV, EtOH abuse  -sober  -Lasix 40mg BID + spironolactone 50mg daily      *NSCLC, squamous cell type. R hilar lymphadenopathy  -initial clinical stage T1N0.   Good resp

## 2020-10-05 NOTE — PROGRESS NOTES
[facility]                INFECTIOUS DISEASE PROGRESS NOTE    Rhonda Villalpando Patient Status:  Inpatient    10/11/1960 MRN [id number]   [facility] 7NE-A Attending Anette Miranda DO   Hosp Day # 4 PCP Raford Cogan, DO 1205 10/03/20  1244 10/04/20  0618 10/04/20  1726 10/05/20  0600   *   < > 192* 128*  --  146*   BUN 12   < > 14 12  --  12   CREATSERUM 0.68*   < > 0.79 0.60*  --  0.61*   GFRAA 120   < > 113 126  --  125   GFRNAA 104   < > 98 109  --  109   CA 8.5 -Ulcer with dermal abscess, bacterial colonization and extensive necrosis.  -No evidence of malignancy. Radiology: Reviewed.        Problem list reviewed:  Patient Active Problem List:     Elevated liver function tests     Essential hypertension     Alco - s/p I&D with excisional debridement to the level of the fascia 10/2, cx with E. Coli, bacteroides fragilis, clostridium clostridiforme  - follow blood cultures, negative so far  - plans for wound vac today (per patient, he prefers to go home on d/c)  - m

## 2020-10-05 NOTE — PROGRESS NOTES
[facility]                INFECTIOUS DISEASE PROGRESS NOTE    Andre Campuzano Patient Status:  Inpatient    10/11/1960 MRN [id number]   [facility] 7NE-A Attending Tess Santana DO   Hosp Day # 4 PCP Gabbie Mendiola DO 1205 10/03/20  1244 10/04/20  0618 10/04/20  1726 10/05/20  0600   *   < > 192* 128*  --  146*   BUN 12   < > 14 12  --  12   CREATSERUM 0.68*   < > 0.79 0.60*  --  0.61*   GFRAA 120   < > 113 126  --  125   GFRNAA 104   < > 98 109  --  109   CA 8.5 -Ulcer with dermal abscess, bacterial colonization and extensive necrosis.  -No evidence of malignancy. Radiology: Reviewed.        Problem list reviewed:  Patient Active Problem List:     Elevated liver function tests     Essential hypertension     Alco - s/p I&D with excisional debridement to the level of the fascia 10/2, cx with E. Coli, bacteroides fragilis, clostridium clostridiforme  - follow blood cultures, negative so far  - plans for wound vac today (per patient, he prefers to go home on d/c)  - m

## 2020-10-05 NOTE — PLAN OF CARE
Received pt at 1930  Pt is NSR/SB on tele, RA  Pt pain managed with tramadol, pt given ambien for sleep  Plan for wound vac today  Needs met, will continue to monitor      Problem: PAIN - ADULT  Goal: Verbalizes/displays adequate comfort level or patient's

## 2020-10-05 NOTE — PROGRESS NOTES
[facility] [place] HOSPITALIST  Progress Note     Estill Blizzard Patient Status:  Inpatient    10/11/1960 MRN [id number]   [facility] Attending Namita Mccarthy DO   Hosp Day # 4 PCP Anjelica Boyd DO     Chief Complaint: left foot pain 8.1*  --  7.8*   ALB 2.5*  --   --   --   --   --    *   < > 131* 134*  --  134*   K 3.7   < > 4.0 3.6 4.2 3.8      < > 101 102  --  103   CO2 26.0   < > 26.0 29.0  --  28.0   ALKPHO 235*  --   --   --   --   --    AST 52*  --   --   --   -- on hold d/t immunotherapy related enterocolitis  9. Immunotherapy related enterocolitis  1. Prednisone taper per onc  2. Oncology following  10. HCV (not treated)  11. NSCLC  1.  Oncology following    Plan of care: abx, wound vac, social work consulted for

## 2020-10-05 NOTE — WOUND PROGRESS NOTE
[facility]  Report of Inpatient Wound Care Progress Note    Jen Gordon Patient Status:  Inpatient    10/11/1960 MRN [id number]   [facility] 7NE-A Attending Irene Barrera DO   Hosp Day # 4 PCP Sylvain Manning DO       SUBJ 8/23/2020    Performed by Cruzito Schaefer MD at [facility] ENDOSCOPY   • IR VARICOSE VEIN ENDOVENOUS LASER ABLATION(CPT=36478)  2018   • NEEDLE BIOPSY LIVER  August 2020     Social History    Tobacco Use      Smoking status: Former Smoker        Packs/day: 1.0 Wound Therapy Dressing placed. The L dorsal foot wound now measures at (15.4cm x 7.2cm x 0.2cm) with 100% sutured Keracis dermal substitute, periwound dry. Slight edema noted. No odor, no purulence noted. Infectious Disease here to see.     Appl

## 2020-10-06 NOTE — PLAN OF CARE
Received pt at 1930  Pt is NSR on tele, RA  Pt has pain, but did not want medications  Wound vac dressing c/d/I, scant amount of ss drainage  Needs met, will continue to monitor      Problem: PAIN - ADULT  Goal: Verbalizes/displays adequate comfort level o Progressing

## 2020-10-06 NOTE — PROGRESS NOTES
[facility]                INFECTIOUS DISEASE PROGRESS NOTE    Asad Napier Patient Status:  Inpatient    10/11/1960 MRN [id number]   [facility] 7NE-A Attending Rajinder Mayes, DO   Hosp Day # 5 PCP Yolanda Knox DO 1205 10/03/20  1244 10/04/20  0618 10/04/20  1726 10/05/20  0600   *   < > 192* 128*  --  146*   BUN 12   < > 14 12  --  12   CREATSERUM 0.68*   < > 0.79 0.60*  --  0.61*   GFRAA 120   < > 113 126  --  125   GFRNAA 104   < > 98 109  --  109   CA 8.5 -Ulcer with dermal abscess, bacterial colonization and extensive necrosis.  -No evidence of malignancy. Radiology: Reviewed.        Problem list reviewed:  Patient Active Problem List:     Elevated liver function tests     Essential hypertension     Alco - s/p I&D with excisional debridement to the level of the fascia 10/2, cx with E. Coli, bacteroides fragilis, clostridium clostridiforme  - follow blood cultures, negative so far  - plans for wound vac today (per patient, he prefers to go home on d/c)  - m

## 2020-10-06 NOTE — PLAN OF CARE
Assumed care at 0730. A&O x4, VSS, tele NSR/Sb,  RA  No acute issues today. Persistent pain, managed by tramedol and tylenol. Wound vac started at -125 presure, no drainage. Pt refusing [facility], wants to go home with [facility].    Will need long term abx and Problem: DISCHARGE PLANNING  Goal: Discharge to home or other facility with appropriate resources  Description: INTERVENTIONS:  - Identify barriers to discharge w/pt and caregiver  - Include patient/family/discharge partner in discharge Arnulfo Hatch

## 2020-10-06 NOTE — CM/SW NOTE
Referral for wound vac sent to [facility] [place]. from [facility] called back to say that insurance approved his wound vac and it will be delivered to pt's home tomorrow. Pt's wife aware of wound vac delivery. [facility] cannot accept pt for [facility].   Referral sent to

## 2020-10-06 NOTE — CM/SW NOTE
Kelsie Regalado accepted pt for [facility]. They can put on pt's wound vac tomorrow once it is delivered from [facility]. Waiting for Dr Felisa Bragg to see pt to find out if pt can d/c home today.

## 2020-10-06 NOTE — PROGRESS NOTES
Hematology/Oncology Progress Note    Patient Name: Haleigh Birmingham Record Number: [id number]    YOB: 1960     Reason for Consultation:  Obed Valdez was seen today for the diagnosis of [address] NSCLC    Interval events: feeling well. 10/01/20  1205 10/01/20  1205 10/03/20  1244 10/04/20  0618 10/04/20  1726 10/05/20  0600   *   < > 131* 134*  --  134*   K 3.7   < > 4.0 3.6 4.2 3.8      < > 101 102  --  103   CO2 26.0   < > 26.0 29.0  --  28.0   BUN 12   < > 14 12  --  12 3/28/19 with an excellent response to therapy.  Unfortunately his liver malignancy is not reliably evaluated by any form of imaging- CT, PET/CT, or MRI, therefore have primarily assessed clinically and with serial AFP monitoring.   Hold nivolumab for at [place] [address]

## 2020-10-06 NOTE — PROGRESS NOTES
[facility] [place] HOSPITALIST  Progress Note     Gerri Perez Patient Status:  Inpatient    10/11/1960 MRN [id number]   [facility] 7NE-A Attending Damion Hall DO   Hosp Day # 5 PCP Mary Kent DO     Chief Complaint: left foot pain *   < > 192* 128*  --  146*   BUN 12   < > 14 12  --  12   CREATSERUM 0.68*   < > 0.79 0.60*  --  0.61*   GFRAA 120   < > 113 126  --  125   GFRNAA 104   < > 98 109  --  109   CA 8.5   < > 8.1* 8.1*  --  7.8*   ALB 2.5*  --   --   --   --   -- wrapping  5. HTN  1. BB, spironolactone  6. Pancytopenia  1. Platelets stable following I&D  2. S/p 2 unit platelets 99/4 for bleeding following dressing change  3. Folic acid  7. EtOH cirrhosis  1. PTA lasix, spironolactone, BB  8. [address] - unresectable  1.

## 2020-10-06 NOTE — PROGRESS NOTES
[facility]                INFECTIOUS DISEASE PROGRESS NOTE    Anthonyprimitivo Darrick Patient Status:  Inpatient    10/11/1960 MRN [id number]   [facility] 7NE-A Attending Henry Portillo DO   Hosp Day # 5 PCP Jonatan Lopez DO 192* 128*  --  146*   BUN 12   < > 14 12  --  12   CREATSERUM 0.68*   < > 0.79 0.60*  --  0.61*   GFRAA 120   < > 113 126  --  125   GFRNAA 104   < > 98 109  --  109   CA 8.5   < > 8.1* 8.1*  --  7.8*   ALB 2.5*  --   --   --   --   --    *   < > 131 list reviewed:  Patient Active Problem List:     Elevated liver function tests     Essential hypertension     Alcoholism /alcohol abuse ([facility] [address]     Neoplasm of skin of face     Family history of bladder cancer     Morbid obesity with BMI of 40.0-44.9, adult Levaquin and Flagyl at the time of d/c.    2. Cirrhosis/Hepatocellular carcinoma  - oncology following    Discussed case with patient and RN. Monty Melara, PA-C    Agree with above

## 2020-10-06 NOTE — PLAN OF CARE
Assumed care at 0730  A&Ox4   Tele- NSR  RA   Wound vac will be delivered at home tomorrow per ANDRES  Plan is to DC tomorrow on PO abx   Will continue to monitor

## 2020-10-07 NOTE — DISCHARGE SUMMARY
[facility] [place] HOSPITALIST  DISCHARGE SUMMARY     Mary Dey Patient Status:  Inpatient    10/11/1960 MRN [id number]   [facility] 7NE-A Attending Justice Dougherty, [facility] Day # 6 PCP Bette Regalado DO     Date of Admission: 10/1/2020  Date o unit of platelets for bleeding following dressing change. Patient was given wound VAC. Surgical cultures grew E. coli, Bacteroides fragilis and Clostridium clostridiforme. Blood cultures were negative. Antibiotics were switched to Ancef and Flagyl.  PT predniSONE 5 MG Tabs  Commonly known as: Monica Desir  What changed:   · medication strength  · how much to take  · additional instructions      Take 2 tablets (10 mg total) by mouth daily.  Until 10/9, then 1 tablet daily x 7 days, then off   Quantity: 21 t [phone]    Call  call for follow up appt    Brandon Young [address] [place] [address]  [phone]    In 1 week      Appointments for Next 30 Days 10/7/2020 - 11/6/2020      Date Arrival Time Visit Type Length Departmen

## 2020-10-07 NOTE — CM/SW NOTE
Pt is ready for d/c today. Spoke with Merly Giraldo at [facility] who said that pt's wound vac will be delivered tomorrow to pt's home (when a pt gets a wound vac delivered to home [employer] goes through [id number] so it takes an extra day).   He can go home with a wet to dry dressi

## 2020-10-07 NOTE — PROGRESS NOTES
[facility]                INFECTIOUS DISEASE PROGRESS NOTE    Estill Blizzard Patient Status:  Inpatient    10/11/1960 MRN [id number]   [facility] 7NE-A Attending Namita Mccarthy DO   Hosp Day # 6 PCP Anjelica Boyd DO 128*  --  146*   BUN 12   < > 14 12  --  12   CREATSERUM 0.68*   < > 0.79 0.60*  --  0.61*   GFRAA 120   < > 113 126  --  125   GFRNAA 104   < > 98 109  --  109   CA 8.5   < > 8.1* 8.1*  --  7.8*   ALB 2.5*  --   --   --   --   --    *   < > 131* 134 reviewed:  Patient Active Problem List:     Elevated liver function tests     Essential hypertension     Alcoholism /alcohol abuse ([facility]     Neoplasm of skin of face     Family history of bladder cancer     Morbid obesity with BMI of 40.0-44.9, adult ([facility] Flagyl at the time of d/c.    2. Cirrhosis/Hepatocellular carcinoma  - oncology following    Ok to d/c to from ID standpoint with po abx as above. Discussed case with patient, RN and Dr. Monie Joya. Truman Hazel PA-C

## 2020-10-07 NOTE — CM/SW NOTE
10/07/20 1500   Discharge disposition   Expected discharge disposition Home-Health   Name of [address] services after discharge Skilled home care;DME   HME provider   [facility] for wound vac)   Discharge transportation P

## 2020-10-07 NOTE — PLAN OF CARE
Assumed care at [address] [facility] and oriented x4  L foot wound vac & ace wrap c/d/I  Abx given  C/o L foot pain, relief with tramadol  50% WB LLE, verbalizes understanding  Plan is for d/c tomorrow with abx, Carrillo Perez  Discussed with patient  Call light in [place]

## 2020-10-07 NOTE — PLAN OF CARE
Pt discharged per wc per transport. Reviewed at length with d/c instructions/avs for wound care, meds, and nisha f/u. Pt states he is going to call Dr Angel Martínez first thing in am to see when he needs to be seen. Has appt with Kourtney at 11 am tomorrow.  jeanette kalyn

## 2020-10-07 NOTE — PROGRESS NOTES
Hematology/Oncology Progress Note    Patient Name: Haleigh Birmingham Record Number: [id number]    YOB: 1960     Reason for Consultation:  Sonja Valverde was seen today for the diagnosis of [address] NSCLC    Interval events: feeling well. 10/01/20  1205 10/01/20  1205 10/03/20  1244 10/04/20  0618 10/04/20  1726 10/05/20  0600   *   < > 131* 134*  --  134*   K 3.7   < > 4.0 3.6 4.2 3.8      < > 101 102  --  103   CO2 26.0   < > 26.0 29.0  --  28.0   BUN 12   < > 14 12  --  12 hepatocellular carcinoma, unresectable  -has been on nivolumab since 3/28/19 with an excellent response to therapy.  Unfortunately his liver malignancy is not reliably evaluated by any form of imaging- CT, PET/CT, or MRI, therefore have primarily assessed

## 2020-10-08 PROBLEM — M79.89 NECROTIZING SOFT TISSUE INFECTION: Status: ACTIVE | Noted: 2020-01-01

## 2020-10-08 PROBLEM — K52.9 ENTEROCOLITIS: Status: ACTIVE | Noted: 2020-01-01

## 2020-10-08 NOTE — PROGRESS NOTES
Erin Bo[address]      HISTORY   CHIEF COMPLAINT: post hospital follow up visit; necrotizing soft tissue infection of left foot s/p I&D with excisional debridement  HPI: Estill Blizzard is a 60year old male here today f folic acid 1 MG Oral Tab, Take 1 mg by mouth daily. , Disp: , Rfl:     •  Pantoprazole Sodium 40 MG Oral Tab EC, Take 1 tablet (40 mg total) by mouth daily. , Disp: 60 tablet, Rfl: 2    •  furosemide 40 MG Oral Tab, Take 1 tablet (40 mg total) by mouth 2 (tw ESOPHAGOGASTRODUODENOSCOPY (EGD) N/A 8/23/2020    Performed by Pato Louis MD at [address] Left 10/2/2020    Performed by Maurizio Veliz DPM at [facility] MAIN OR   • IR VARICOSE VEIN ENDOVENOUS LASER AB intact.     Dictated by (CST): Alivia Powell MD on 10/01/2020 at 10:15 PM     Finalized by (CST): Alivia Powell MD on 10/01/2020 at 10:21 PM         Lab Results   Component Value Date/Time    WBC 3.2 (L) 10/05/2020 09:33 AM    HGB 10.4 (L) 10/05/2020 09:33 vac  · Follow up with podiatry 10/22/2020  · levaquin and flagyl for 15 days  · Tramadol PRN    3. Essential hypertension  · carvedilol    4. Anemia, unspecified type  · Folic acid    5. Coagulopathy ([facility]    6.  Alcoholic cirrhosis of liver without ascites medications and discontinued medications.     I certify that the following are true:  Communication with the patient was made within 2 business days of discharge on date above   Medical Decision Making- Based on service period of discharge to 30 days:   · N

## 2020-10-08 NOTE — TELEPHONE ENCOUNTER
Wife called that she was picking up his abx and his prednisone and the pharmacists didn't want to give her all of the medications because they have interaction of tendonitis.  Informed wife to continue taking medications as prescribed the prednisone is a sh

## 2020-10-08 NOTE — PROGRESS NOTES
Initial Post Discharge Follow Up   Discharge Date: 10/7/20  Contact Date: 10/8/2020    Consent Verification:  Assessment Completed With: Patient  HIPAA Verified?   Yes    Discharge Dx:     Necrotizing soft tissue infection of left foot s/p I&D with excis different diet per AVS? no    Medications:   Current Outpatient Medications   Medication Sig Dispense Refill   • metRONIDAZOLE 500 MG Oral Tab Take 1 tablet (500 mg total) by mouth 2 (two) times a day for 15 days.  30 tablet 0   • levofloxacin 750 MG Oral T set up? Yes    If Yes: With Whom:  [facility]    DME ordered at D/C? Yes   What? Wound vac from [employer]  Have you received your (DME)?    yes    Re-Admit:  Tell me what led up to your readmission:  Instructed to go to ER per ID for concern of worsening cellulit 911.  Reviewed s/s of worsening infection with pt as well as wound care. Educated pt on the importance of taking all meds as prescribed as well as close f/u with PCP/specialists.   Pt rushed through call stating he understood everything, is doing fine, has

## 2020-10-08 NOTE — PAYOR COMM NOTE
--------------  DISCHARGE REVIEW    Payor: [address] #:  [id number]  Authorization Number: [id number]    Admit date: 10/1/20  Admit time:  8856  Discharge Date: 10/7/2020  4:30 PM     Admitting Physician: Citlalli Krause cellulitis, possible emphysema over medial malleolus but no evidence of osteomyelitis. Left foot x-ray was without any gas. Oncology was consulted due to his extensive malignancy history.   They continued his prednisone taper for immunotherapy induced ent taking on: October 22, 2020  Quantity: 15 tablet  Refills: 0     metRONIDAZOLE 500 MG Tabs  Commonly known as: FLAGYL  Notes to patient: Start this med tomorrow am. Take twice a day.       Take 1 tablet (500 mg total) by mouth 2 (two) times a day for 15 day Medications      These medications were sent to [facility]/pharmacy #[id number]- [address] [place], [address] [address]    Phone: [phone]   · levofloxacin 750 MG Tabs  · metRONIDAZOLE 500 MG Tabs  · predniSONE 5

## 2020-10-09 NOTE — PROGRESS NOTES
TRANSITIONAL CARE CLINIC PHARMACIST MEDICATION RECONCILIATION        Kirk Hollingsworth MRN [id number]    10/11/1960 PCP Jonatan Lopez DO       Comments: Medication history completed by the [address] [place] [address] Pharmacist with the patient in the off seeing many patients not tolerating the three times a day dosing, so he has been reducing some of his patients to twice daily.   Counseled the patient on avoiding any dairy products within 2 hours of the Levofloxacin and to avoid any alcohol with the Metron

## 2020-10-15 NOTE — ED INITIAL ASSESSMENT (HPI)
Patient states the wound vac on his left foot is not working, it was changed at 1200 by home health and it has not worked since. A&Ox4.

## 2020-10-15 NOTE — ED PROVIDER NOTES
Patient Seen in: [facility] Emergency Department      History   Patient presents with:  Postop/Procedure Problem    Stated Complaint: wound vac left stopped working/cancer patient    HPI    Brendasilvia Laddshanita is a pleasant 79-year-old gentleman with multiple hea lower extremities 8/22/2017   • Thrombocytopenia (HCC) 8/22/2017   • Uncomfortable fullness after meals    • Unspecified essential hypertension    • Visual impairment     glasses   • Vomiting    • Wears glasses 1980   • Weight loss               Past Surgi Resp 18   Ht 180.3 cm (5' 11\")   Wt 105.2 kg   SpO2 98%   BMI 32.36 kg/m²         Physical Exam    Alert and oriented patient appears no distress HEENT exam normal lungs normal cardiovascular exam normal abdomen normal 3+ pitting edema of the left of the

## 2020-10-16 NOTE — PROGRESS NOTES
Hematology/Oncology Clinic Follow Up Visit    Patient Name: Haleigh Birmingham Record Number: [id number]    YOB: 1960    PCP: Dr. Toby Rodriguez   Other providers: Dr. Arina Mosley (liver)    Reason for Consultation:  Sonja mendez nivolumab  -6/7/19- cycle 6 nivolumab  -6/21/19- cycle 7 nivolumab  -6/28/19- CT c/a/p- Significant decrease in the size of the RUL lobe spiculated lung mass (21 x 23mm -> 11 x 14mm).   Decrease in size of heterogeneously enhancing mass in segment 5 of the significant radiographic differences noted despite dramatic response based on AFP levels and clear clinical improvement, therefore consensus is that his malignancy is not evaluable by imaging**  -1/13/20- CT chest- Relatively stable spiculated opacity abiola is unchanged compared to most recent CT chest but it has decreased significantly since imaging prior to start of immunotherapy.   -8/12/20- CT c/a/p- No new suspicious nodule, mass or consolidation.  Spiculated opacity adjacent to the horizontal fissure in smoking    • Constipation    • Diarrhea, unspecified    • Easy bruising January 2019   • Elevated liver function tests 7/24/2013   • Family history of bladder cancer 9/23/2014   • Fatigue    • Flatulence/gas pain/belching    • Hepatitis    • Hepatocellular days., Disp: 15 tablet, Rfl: 0    •  predniSONE 5 MG Oral Tab, Take 2 tablets (10 mg total) by mouth daily.  Until 10/9, then 1 tablet daily x 7 days, then off, Disp: 21 tablet, Rfl: 0    •  carvedilol 12.5 MG Oral Tab, Take 12.5 mg by mouth 2 (two) times d Review of Systems:  A 10-point ROS was done with pertinent positives and negative per the HPI    Vital Signs:  Height: 183 cm (6' 0.05\") (10/16 1056)  Weight: --  BSA (Calculated - sq m): --  Pulse: 83 (10/16 1056)  BP: 100/62 (10/16 1056)  Temp: 96 ° 10/25/2016    GFRNAA 104 10/16/2020    GFRAA 120 10/16/2020    CA 7.7 (L) 10/16/2020    OSMOCALC 285 10/16/2020    ALKPHO 154 (H) 10/16/2020    AST 35 10/16/2020    ALT 26 10/16/2020    BILT 2.5 (H) 10/16/2020    TP 5.3 (L) 10/16/2020    ALB 2.3 (L) 10/16/ COLIN 298.1 12/17/2018     Lab Results   Component Value Date    SAT 41 08/14/2020    SAT 39 12/17/2018     Lab Results   Component Value Date    B12 1,380 (H) 06/07/2019    B12 966 09/23/2014     Lab Results   Component Value Date    MMA 0.21 12/17/2018 0.0 - 8.0 % 0.0   TEG Heparinase Specimen       Other   Split Time      min 5.2   R Time      5.0 - 10.0 min 5.7   K Time      1.0 - 3.0 min 3.2 (H)   Alpha Angle      53.0 - 72.0 deg 56.8   Max Amplitude      50.0 - 70.0 mm 40.0 (L)   G Clot Strength His AFP came down from over 30,000 to <40 where it has remained stable since 6/2019. While his AFP never normalized, it remains markedly less and stable compared to prior.   We discussed uncertainty of whether additional immunotherapy would be of benefit a

## 2020-10-16 NOTE — PROGRESS NOTES
Outpatient Oncology Care Plan  Problem list:  pain  fatigue  knowledge deficit    Problems related to:    disease/disease progression  self care  side effect of treatment    Interventions:  provided general teaching    Expected outcomes:  understands plan

## 2020-10-17 PROBLEM — K52.9 ENTEROCOLITIS: Status: RESOLVED | Noted: 2020-01-01 | Resolved: 2020-01-01

## 2020-10-22 NOTE — PROGRESS NOTES
Kiersten Robbins is a 63year old male. Patient presents with:  Hospital F/U: 10/14/2020 seen in hospital. Excision and drainage of left foot done 10/2/2020. Rates pain 4-5/10 at this time with throbbing sensation. requesting pain medication.         HPI: on 10/22/2020 ) 15 tablet 0   • predniSONE 5 MG Oral Tab Take 2 tablets (10 mg total) by mouth daily.  Until 10/9, then 1 tablet daily x 7 days, then off (Patient not taking: Reported on 10/22/2020 ) 21 tablet 0      Past Medical History:   Diagnosis Date OR   • IR VARICOSE VEIN ENDOVENOUS LASER ABLATION(CPT=36478)  2018   • NEEDLE BIOPSY LIVER  August 2020      Family History   Problem Relation Age of Onset   • Cancer Neg    • Blood Disorder Neg       Social History    Socioeconomic History      Marital st erythema but this is mostly due to his overall medical condition which includes liver disease.   Removal of the wound VAC shows that the previously infected area is almost completely granular it is just distal and medial and there is still a little bit of d

## 2020-10-27 NOTE — TELEPHONE ENCOUNTER
S/w Ashleigh Gordon and she states pt insurance is denying the wound vac but they offering a chance to do a peer to peer.   To schedule peer to peer call [phone]  [facility]#[id number]  #[phone]  Choose option 4  Called for peer to peer and scheduled today @ 3pm wi

## 2020-10-27 NOTE — TELEPHONE ENCOUNTER
Aparna Harden calling from [address] needs to speak with RN denial of wound vac urgent please advise

## 2020-11-01 PROBLEM — K86.1 ACUTE ON CHRONIC PANCREATITIS (HCC): Status: ACTIVE | Noted: 2020-01-01

## 2020-11-01 PROBLEM — S91.302A WOUND OF LEFT FOOT: Status: ACTIVE | Noted: 2020-01-01

## 2020-11-01 PROBLEM — K85.90 ACUTE ON CHRONIC PANCREATITIS (HCC): Status: ACTIVE | Noted: 2020-01-01

## 2020-11-01 PROBLEM — A04.72 CLOSTRIDIUM DIFFICILE COLITIS: Status: ACTIVE | Noted: 2020-01-01

## 2020-11-01 NOTE — ED INITIAL ASSESSMENT (HPI)
Pt c/o pain throughout mid-abdominal region, sharp, started last night after dinner; also c/o nausea & vomiting.  Pt hx lung ca & liver cirrhosis

## 2020-11-01 NOTE — H&P
[facility]  History and Physical     [facility] Patient Status:  Emergency    10/11/1960 MRN [id number]   Location [address] Attending Charline Garcia MD   [facility] Day # 0 PCP Ruth Fernandes DO     Chief Complain 9/23/2014   • Nausea August 2020   • Personal history of antineoplastic chemotherapy    • Primary lung squamous cell carcinoma, right ([address] 3/14/2019   • Severe obesity (BMI 35.0-39. 9) 8/22/2017   • Symptomatic varicose veins of both lower extremities 8/22 times daily with meals. , Disp: , Rfl:     •  Multiple Vitamins-Minerals (TAB-A-GALINDO) Oral Tab, Take 1 tablet by mouth daily. , Disp: , Rfl:     •  spironolactone 50 MG Oral Tab, Take 50 mg by mouth daily. , Disp: , Rfl:     •  folic acid 1 MG Oral Tab, Take foot in bandage c/d/i  Integument: No rashes or lesions. Psychiatric: Appropriate mood and affect.       Diagnostic Data:      Labs:  Recent Labs   Lab 11/01/20  1330   WBC 6.2   HGB 13.0   MCV 96.6   PLT 99.0*       Recent Labs   Lab 11/01/20  1330   GLU lovenox  · CODE status: FULL  · [place]: no  · If COVID testing is negative, may discontinue isolation: yes     Plan of care discussed with patient    Brian Tipton, DO  11/1/2020

## 2020-11-01 NOTE — ED PROVIDER NOTES
Patient Seen in: [facility] Emergency Department      History   Patient presents with:  Abdomen/Flank Pain    Stated Complaint: ABD PAIN, N/V/D    HPI    This is a 69-year-old male who presents here to the emergency department complaining of abdomin essential hypertension    • Visual impairment     glasses   • Vomiting    • Wears glasses 1980   • Weight loss               Past Surgical History:   Procedure Laterality Date   • BIOPSY OF SKIN LESION      face and leg   • COLONOSCOPY N/A 9/17/2020    Per patient in no apparent distress alert and oriented ×3  HEENT: Pupils are equal reactive to light. Extra ocular motions are intact. Skin is dry. Head is atraumatic normocephalic. Oral mucosa dry. Tongue is midline. No posterior pharyngeal lesions. Status                     ---------                               -----------         ------                     CBC W/ DIFFERENTIAL[[id number]]          Abnormal            Final result                 Please view results for these tests on the individual enhancing foci throughout the liver. Marletta Rowels is a 6.0 x 5.5 cm hypo enhancing mass like area arising from the caudate lobe as well as a more dominant hypo   enhancing mass like area arising from the inferior right lobe of the liver.  This was demonstrated o ABDOMINAL WALL:  Small fat containing inguinal hernias bilaterally. Diony Phil    PELVIC ORGANS:  Urinary bladder is fairly well distended.  Prostate is within normal limits  No free fluid.     BONES:  Stable.  Stable 1.6 cm lucency arising from the L3 vertebral reji for any acute interabdominal process that may also be causing symptomology. There is slightly elevated lipase he may have pancreatitis is on top of his C. difficile colitis. He did receive Toradol for discomfort control. He was given vancomycin. Spoke to

## 2020-11-02 NOTE — CONSULTS
INFECTIOUS DISEASE CONSULT NOTE    Mary Aleidanikos Patient Status:  Inpatient    10/11/1960 MRN [id number]   [facility] 4NW-A Attending Pablo Myers MD   Hosp Day # 1 PCP Arlen Horton meals    • Unspecified essential hypertension    • Visual impairment     glasses   • Vomiting    • Wears glasses 1980   • Weight loss      Past Surgical History:   Procedure Laterality Date   • BIOPSY OF SKIN LESION      face and leg   • COLONOSCOPY N/A 9/ of Systems:    REVIEW OF SYSTEMS:  CONSTITUTIONAL:  No weight loss, weakness or fatigue. HEENT:  Eyes:  No change in vision. Ears, Nose, Throat:  No congestion, runny nose or sore throat. SKIN:  No rash or itching.   CARDIOVASCULAR:  No chest pain, chest Once able to tolerate PO more consistently, OK to discharge from ID standpoint  - Wound care management per wound care service  - Monitor WBC, fever curve.   Daily labs per primary  - Continue current supportive care  - Labs/imaging/chart reviewed  - Plan d

## 2020-11-02 NOTE — ANESTHESIA PREPROCEDURE EVALUATION
PRE-OP EVALUATION    Patient Name: Mignon Adame    Pre-op Diagnosis: Nausea [R11.0]  Generalized abdominal pain [R10.84]  Abnormal CT scan, small bowel [R93.3]    Procedure(s):  ESOPHAGOGASTRODUODENOSCOPY (EGD)    Surgeon(s) and Role:     * Ab Jhonatan hypertension and well controlled                                    Endo/Other               (+) anemia        (+) thrombocytopenia    (+) arthritis       Pulmonary  Comment: Former tobacco abuse                         Neuro/Psych  Comment: EtOH abuse (L) 11/02/2020     (H) 11/02/2020    CA 8.0 (L) 11/02/2020     Lab Results   Component Value Date    INR 1.51 (H) 10/16/2020         Airway      Mallampati: III  Mouth opening: 3 FB  TM distance: 4 - 6 cm  Neck ROM: full Cardiovascular    Cardiovasc

## 2020-11-02 NOTE — CONSULTS
[facility]  Report of Inpatient Wound Care Consultation     Nereida Barnes Patient Status:  Inpatient    10/11/1960 MRN [id number]   [facility] 4NW-A Attending Aby Prasad MD   Hosp Day # 1 PCP Fabio Freire DO     REASON FOR History:   Procedure Laterality Date   • BIOPSY OF SKIN LESION      face and leg   • COLONOSCOPY N/A 9/17/2020    Performed by Ephraim Eng MD at [facility] ENDOSCOPY   • ESOPHAGOGASTRODUODENOSCOPY (EGD) N/A 9/17/2020    Performed by Ephraim Eng MD at  ABD pad, kerlix. Durable Medical Equipment:  NA  Offloading/Footwear:  NA  Compression:  NA    Physical Therapy Wound Goals:  1. Maintain optimal wound healing environment  2. Remove wound bioburden  3.  Decrease periwound edema      PLAN OF CARE:   Jesse

## 2020-11-02 NOTE — PLAN OF CARE
Problem: PAIN - ADULT  Goal: Verbalizes/displays adequate comfort level or patient's stated pain goal  Description: INTERVENTIONS:  - Encourage pt to monitor pain and request assistance  - Assess pain using appropriate pain scale  - Administer analgesics and lab values  - Obtain nutritional consult as needed  - Optimize oral hygiene and moisture  - Encourage food from home; allow for food preferences  - Enhance eating environment  Outcome: Progressing     Problem: SKIN/TISSUE INTEGRITY - ADULT  Goal: Skin

## 2020-11-02 NOTE — PROGRESS NOTES
[facility]  Progress Note     Altamirano Prude Patient Status:  Inpatient    10/11/1960 MRN [id number]   [facility] 4NW-A Attending Katie Mccord MD   Hosp Day # 1 PCP Randee Brown DO     Chief Complaint: abd pain   S:  Very Colitis on CTAP - Wall thickening of portions of small bowel and colon most severe involving the 2nd and 3rd portions of the duodenum and right side of the colon.  There is additional ground-glass opacity in the mesentery suggestive of enterocolitis with m

## 2020-11-02 NOTE — PAYOR COMM NOTE
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 Diff +.      ADMISSION REVIEW     Payor: [facility] #:  [id number]  Authorization Nu antineoplastic chemotherapy    • Primary lung squamous cell carcinoma, right ([facility] 3/14/2019   • Severe obesity (BMI 35.0-39. 9) 8/22/2017   • Symptomatic varicose veins of both lower extremities 8/22/2017   • Thrombocytopenia ([facility] 8/22/2017   • Mary Ellen Schmitz entry noted. Cardiac: Regular rate and rhythm. Normal S1 and 2 without murmurs or ectopy appreciated  Abdomen: Soft on examination there is some discomfort in the epigastrium. Slight distention the abdomen. No tympany to percussion.   Bowel sounds are p RAINBOW DRAW LAVENDER   RAINBOW DRAW LIGHT GREEN   RAINBOW DRAW GOLD   STOOL CULTURE W/SHIGATOXIN    Narrative: The following orders were created for panel order STOOL CULTURE W/SHIGATOXIN.   Procedure                               Abnormality measures approximately 3.5 x 2.2 cm.  Scattered smaller lesions in the liver appear   relatively stable in size. BILIARY:  Slight thickening of the gallbladder wall is nonspecific.  No biliary ductal dilatation.    PANCREAS:  Homogeneous enhancement.  The narrowing L2-3.  Schmorl's node T10 superior endplate.  Mild degenerative changes in the lower lumbar facets.             Impression     CONCLUSION:  Wall thickening of portions of small bowel and colon most severe involving the 2nd and 3rd portions of the Impression:  Clostridium difficile colitis  (primary encounter diagnosis)  Acute on chronic pancreatitis ([facility]    Disposition:  Admit  11/1/2020  4:49 pm                H&P - H&P Note          Chief Complaint: N/V, diffuse abdominal pain    History of Pres organomegaly. Neurologic: No focal neurological deficits. CNII-XII grossly intact. Musculoskeletal: Moves all extremities. Extremities: BLLE edema and erythema of LLE much improved from my previous exam earlier this month.  L foot in bandage c/d/i  Integ treated)  10. NSCLC  1.  Oncology consult               11/2 HOSP    Very nauseated.      Vital signs:  Temp:  [98.1 °F (36.7 °C)-98.7 °F (37.1 °C)] 98.7 °F (37.1 °C)  Pulse:  [73-89] 83  Resp:  [14-25] 18  BP: (103-151)/() 126/68    Recent Labs   Lab you.  Debbie Minor, APRN  9:26 AM  11/2/2020  [facility]  [phone]     GI attending addendum:     I have personally seen and examined this patient and agree with above nurse practitioner's assessment and recommendations.      Briefly, DIFFICILE(TOXIGENIC) BY PCR  C. DIFFICILE(TOXIGENIC)PCR  Collected: 11/01/20 1349   Result status: Final   Resulting lab: [facility] [place] [facility] ([facility])   Reference range: Negative   Value: PositiveAbnormal            Contains abnormal data T

## 2020-11-02 NOTE — CM/SW NOTE
Care Progression Note:  Active Acute Medical Issue:   Clostridium difficile colitis     Other Contributing Medical Factors/Dx. :  Chronic Left foot ulcer, Liver Mass, Cirrhosis c/b pHTN, enterocolitis, NSCLC- oncology consult.       The wound to the L dorsal

## 2020-11-02 NOTE — PLAN OF CARE
Patient A/O X 3. Up with SBA. VSS. GI and ID consulted. Wound vac removed, wet to dry dressing applied per protocol. Patient medicated with tramadol for pain PRN.      Problem: PAIN - ADULT  Goal: Verbalizes/displays adequate comfort level or patient's stat Monitor percentage of each meal consumed  - Identify factors contributing to decreased intake, treat as appropriate  - Assist with meals as needed  - Monitor I&O, WT and lab values  - Obtain nutritional consult as needed  - Optimize oral hygiene and moistu

## 2020-11-02 NOTE — CONSULTS
Hematology/Oncology Initial Consultation Note    Patient Name: Haleigh Birmingham Record Number: [id number]    YOB: 1960   Date of Consultation: 11/2/2020   Physician requesting consultation: Dr. Rahul Rojas    Reason for Consultation: cycle 5 nivolumab  -6/7/19- cycle 6 nivolumab  -6/21/19- cycle 7 nivolumab  -6/28/19- CT c/a/p- Significant decrease in the size of the RUL lobe spiculated lung mass (21 x 23mm -> 11 x 14mm).   Decrease in size of heterogeneously enhancing mass in segment 5 2/20/19, no significant radiographic differences noted despite dramatic response based on AFP levels and clear clinical improvement, therefore consensus is that his malignancy is not evaluable by imaging**  -1/13/20- CT chest- Relatively stable spiculated chest- as above  -5/19/20- CT chest- stable exam.  Appearance of small right lung lesion is unchanged compared to most recent CT chest but it has decreased significantly since imaging prior to start of immunotherapy.   -8/12/20- CT c/a/p- No new suspicious August 2020   • Abdominal pain    • Alcoholism /alcohol abuse ([facility] 9/23/2014   • Black stools    • Bloating August 2020   • Blood in the stool    • Chronic cough 5/8/2015   • Cigarette smoker 5/8/2015    50 pack year h/o smoking    • Constipation    • Keke [COMPLETED] 0.9% NaCl infusion, , Intravenous, Once, Fatuma Kearns MD, Last Rate: 10 mL/hr at 10/03/20 1530      •  acetaminophen 500 MG Oral Tab, Take 500-1,000 mg by mouth 2 (two) times daily as needed for Pain., Disp: , Rfl:     •  traMADol HCl 50 MG 45.00        Pack years: 45        Types: Cigarettes        Quit date: 2019        Years since quittin.7      Smokeless tobacco: Former User        Types: Snuff      Tobacco comment: 2ppd for 20yrs, 1ppd for 10 years, 10 cigs per day for the last HGB 13.0 12.2*   HCT 37.4* 35.2*   PLT 99.0* 97.0*   MCV 96.6 97.0   RDW 14.0 14.1   NEPRELIM 4.68 6.22       Recent Labs   Lab 11/01/20  1330 11/02/20  0611   * 134*   K 4.0 3.5    104   CO2 21.0 23.0   BUN 11 22*   CREATSERUM 0.71 0.62*   G the duodenum with some surrounding soft tissue stranding. There is suggestion of mild diffuse wall thickening involving small bowel loops in the left upper quadrant and mid abdomen.   There is some wall thickening with pericolonic inflammatory stranding in significant change. Stable nonspecific lucency arising from L3. Impression & Plan:     *diarrhea, odynophagia, abd pain-   -new dx of c diff colitis, likely exacerbated by recent abx use for foot infection.   Started on PO vanco, diarrhea is improvin leukopenia, thrombocytopenia  -due to liver dysfunction with some low grade DIC, splenomegaly, HCV, malignancy, folate def  -continue 1mg folic acid daily.      Tommie Ken MD  Hematology/Medical [address] [facility]

## 2020-11-02 NOTE — CONSULTS
[facility]                       Gastroenterology [address] [facility] Gastroenterology    Virginia Ratliff Patient Status:  Inpatient    10/11/1960 MRN [id number]   [facility] 4NW-A Attending Yannick Villafuerte MD   Hosp Day # 1 PCP Shona 9/23/2014   • Fatigue    • Flatulence/gas pain/belching    • Hepatitis    • Hepatocellular carcinoma ([facility] 3/20/2019   • Hypoalbuminemia due to protein-calorie malnutrition ([facility] 8/22/2017   • Indigestion    • Leukocytopenia 9/23/2014   • Loss of appetite LORazepam (ATIVAN) injection 1 mg, 1 mg, Intravenous, Once    •  acetaminophen (TYLENOL) tab 650 mg, 650 mg, Oral, Q6H PRN    •  ondansetron HCl (ZOFRAN) injection 4 mg, 4 mg, Intravenous, Q6H PRN    •  Heparin Sodium (Porcine) 5000 UNIT/ML injection 5,000 hoarseness of voice, hearing loss, sinus congestion, tinnitus           Neurologic: The patient reports no history of seizure, stroke, or frequent headaches  PE: /68 (BP Location: Left arm)   Pulse 83   Temp 98.7 °F (37.1 °C) (Oral)   Resp 18   Ht 5' PROCEDURE:  CT ABDOMEN+PELVIS (CONTRAST ONLY) (CPT=74177)     COMPARISON:  [facility] , CT, CT ABDOMEN+PELVIS(CONTRAST ONLY)(CPT=74177), 9/02/2020, 2:40 PM.     INDICATIONS:  ABD PAIN, N/V/D     TECHNIQUE:  CT scanning was performed from the dome of the diap gastrohepatic ligament and splenic hilum. RETROPERITONEUM:  Numerous retroperitoneal, periportal and portacaval lymph nodes appear relatively stable. Celiac lymph node is stable measuring 2.8 x 1.4 cm.     BOWEL/MESENTERY:  There is wall thickening involv unchanged. Masslike area arising from the caudate lobe of the liver measuring 6 cm. Hepatic neoplasm/ metastases should be excluded.   Additional area in the right lobe of the liver previously noted is measuring slightly smaller on today's exam.  Scatt during that time. Patient was recently discontinued off of steroids after development of lower extremity cellulitis. C. difficile positive during this admission. CT abdomen pelvis concerning for thickening of the small bowel and colon.   Patient mostly w

## 2020-11-03 NOTE — PROGRESS NOTES
Hematology/Oncology Progress Note    Patient Name: Haleigh Birmingham Record Number: [id number]    YOB: 1960     Reason for Consultation:  Tracy Franco was seen today for the diagnosis of [address] diarrhea, NSCLC    Interval events: Con 135* 134*   K 4.0 3.5    104   CO2 21.0 23.0   BUN 11 22*   CREATSERUM 0.71 0.62*   GFRAA 117 124   GFRNAA 101 107   * 141*   CA 8.6 8.0*   TP 6.6  --    ALB 2.7*  --    ALKPHO 141*  --    AST 66*  --    ALT 29  --    BILT 3.4*  --        No r and mid abdomen. There is some wall thickening with pericolonic inflammatory stranding involving the ascending transverse colon and portions of the descending colon. Findings concerning for colitis. No free air. The appendix is normal in caliber.   Ther exacerbated by recent abx use for foot infection. Started on PO vanco, diarrhea is improving.   -concern that he may have developed recurrent immunotherapy induced enteritis as well given upper GI sx of n/v and pain.    -GI following, will perform EGD toda nivolumab on CT.   -repeat CT chest next month.  Holding immunotherapy as above.      *anemia, leukopenia, thrombocytopenia  -due to liver dysfunction with some low grade DIC, splenomegaly, HCV, malignancy, folate def  -continue 1mg folic acid daily.      M

## 2020-11-03 NOTE — PROGRESS NOTES
[facility]  Progress Note     Lesli Schaefer Patient Status:  Inpatient    10/11/1960 MRN [id number]   [facility] 4NW-A Attending Guille Whaley MD   Hosp Day # 3 PCP Vivienne Franco DO     Chief Complaint: Cdiff    S: Patient st --        Estimated Creatinine Clearance: 168.6 mL/min (A) (based on SCr of 0.49 mg/dL (L)). Recent Labs   Lab 11/03/20  0953   PTP 20.2*   INR 1.68*       No results for input(s): TROP, CK in the last 168 hours.          Imaging: Imaging data reviewed

## 2020-11-03 NOTE — CM/SW NOTE
Pt noted to need dressing changes every 24 hours per wound care. Paged [facility] to see pt and offer [facility].

## 2020-11-03 NOTE — CM/SW NOTE
[facility] stating the pt is current w/[facility]. Sent resume HH order via [facility]. Pt no longer has the wound vac from [facility].  SW to remain available and follow up if any other needs arise

## 2020-11-03 NOTE — PLAN OF CARE
Pt Aox4. VSS. Ambulatory in room. Dressing on left foot c/d/I--to be changed tomorrow AM per woundcare orders. Plans for EGD this afternoon. IVF per [facility]. One episode of loose stool this AM, no vomiting. Platelets 58, no s/s of bleeding currently.  Per heme/

## 2020-11-03 NOTE — PROGRESS NOTES
[facility]  Progress Note     Kirk Hollingsworth Patient Status:  Inpatient    10/11/1960 MRN [id number]   [facility] 4NW-A Attending Danny Rees MD   Hosp Day # 2 PCP Jonatan Lopez DO     Chief Complaint: abd pain   S:  Patie meals   • folic acid  1 mg Oral Daily     ASSESSMENT / PLAN:   1.  Abdominal pain, N/V; Colitis on CTAP - Wall thickening of portions of small bowel and colon most severe involving the 2nd and 3rd portions of the duodenum and right side of the colon. Farhad Kelley

## 2020-11-03 NOTE — OPERATIVE REPORT
Operative Report-Esophagogastroduodenoscopy      PREOPERATIVE DIAGNOSIS/INDICATION: Abdominal pain, nausea, abnormal CT    POSTOPERTATIVE DIAGNOSIS: Ulcerative esophagitis and duodenitis; gastritis     PROCEDURE PERFORMED: EGD    I presumed checkpoint inhibitor effect       Dilan Israel  11/3/2020  1:29 PM

## 2020-11-03 NOTE — ANESTHESIA POSTPROCEDURE EVALUATION
[facility]    Lesli Schaefer Patient Status:  Inpatient   Age/Gender 63year old male MRN [id number]   Location [address] [facility]. Attending Guille Whaley MD   [facility] Day # 2 PCP Vivienne Franco DO       Anesthesia Post-op Note    Procedure(s)

## 2020-11-03 NOTE — PROGRESS NOTES
INFECTIOUS DISEASE CONSULT NOTE    Nereida Barnes Patient Status:  Inpatient    10/11/1960 MRN [id number]   [facility] 4NW-A Attending Aby Prasad MD   Hosp Day # 2 PCP Lars Nelson by Devin Thorpe MD at [facility] ENDOSCOPY   • ESOPHAGOGASTRODUODENOSCOPY (EGD) N/A 8/23/2020    Performed by Michael Baires MD at [address] [place] [address] Left 10/2/2020    Performed by Magaly Alberto DPM at [facility] MAIN rate 19, height 180.3 cm (5' 11\"), weight 236 lb (107 kg), SpO2 98 %. General: Alert, oriented, NAD  HEENT: Moist mucous membranes. EOMI. Normal conjunctiva  Neck: No lymphadenopathy. Supple. Respiratory: Clear to auscultation bilaterally.   No wheez

## 2020-11-04 NOTE — PROGRESS NOTES
Assumed care of patient at approximately 1600. Pt transferred from 410 to 195. Pt resting in bed. Electrolytes replaced per protocol. Pt with some difficulty taking pills r/t nausea. Poor oral intake. Diarrhea has slowed down compared to yesterday.   R

## 2020-11-04 NOTE — PROGRESS NOTES
Hematology/Oncology Progress Note    Patient Name: Haleigh Birmingham Record Number: [id number]    YOB: 1960     Reason for Consultation:  Jules Sánchez was seen today for the diagnosis of [address] diarrhea, NSCLC    Interval events: EGD place    Laboratory:  Recent Labs   Lab 11/01/20  1330 11/02/20  0611 11/03/20  0953   WBC 6.2 8.3 6.1   HGB 13.0 12.2* 10.9*   HCT 37.4* 35.2* 31.5*   PLT 99.0* 97.0* 58.0*   MCV 96.6 97.0 97.5   RDW 14.0 14.1 14.5   NEPRELIM 4.68 6.22 4.47       Recent L retroperitoneal, periportal and portacaval lymph nodes appear relatively stable. Celiac lymph node is stable measuring 2.8 x 1.4 cm.     BOWEL/MESENTERY:  There is wall thickening involving the 2nd and 3rd portions of the duodenum with some surrounding sof neoplasm/ metastases should be excluded. Additional area in the right lobe of the liver previously noted is measuring slightly smaller on today's exam.  Scattered   low-attenuation foci in the liver otherwise without significant change.    Stable nonspecif enterocolitis.     *Cirrhosis- due to HCV, EtOH abuse  -holding Lasix 40mg BID + spironolactone 50mg daily for now d/t diarrhea.      *NSCLC, squamous cell type. R hilar lymphadenopathy  -initial clinical stage T1N0. Good response to nivolumab on CT.    -

## 2020-11-04 NOTE — PROGRESS NOTES
[facility] Gastroenterology Progress Note    CC: abdominal pain, diarrhea     S: Pt with improved abdominal pain, some odynophagia; still with nausea with meals     O: /56   Pulse 76   Temp 98.7 °F (37.1 °C) (Oral)   Resp 20   Ht 71\"   Wt 236 l

## 2020-11-04 NOTE — DIETARY NOTE
[facility]   CLINICAL NUTRITION    Tennis Mansfield Hogrefe     Admitting diagnosis:  Clostridium difficile colitis [A04.72]  Acute on chronic pancreatitis ([address] [K85.90, K86.1]    Ht: 180.3 cm (5' 11\")  Wt: 107 kg (236 lb).  This is 139 % of IBW  Body mass in

## 2020-11-04 NOTE — PLAN OF CARE
Patient received alert and oriented, reports no diarrhea this shifft or previous shift, he had no complaints of pain, he is ambulatory in room with stby assistance. Fluids encourage. Hospitalist paged regarding if it is ok to resume heparin.  Pending

## 2020-11-04 NOTE — PROGRESS NOTES
[facility]  Progress Note     Melvina Rodriguez Patient Status:  Inpatient    10/11/1960 MRN [id number]   [facility] 4NW-A Attending Rajesh Monroe MD   [facility] Day # 3 PCP America Hartman DO     Chief Complaint: abd pain     S:  Claudia (L)).  Recent Labs   Lab 11/03/20  0953   PTP 20.2*   INR 1.68*     No results for input(s): TROP, CK in the last 168 hours. Imaging: Imaging data reviewed in Epic.   Medications:   • pantoprazole (PROTONIX) IV push  40 mg Intravenous Q12H   • vancomyci

## 2020-11-05 NOTE — PLAN OF CARE
Patient received alert and oriented, no complaints of pain, no nausea this shift, able to make needs known. Rested uneventfully throughout the night. He is ambulatory to the bathroom.      Problem: PAIN - ADULT  Goal: Verbalizes/displays adequate comfort

## 2020-11-05 NOTE — PLAN OF CARE
Assumed care of pt @ 1030. Pt is A/Ox4,VSS. Pt had 1 small episode of diarrhea but states was more gas than stool. Denies pain. Ambulates with steady gait. IVF infusing. remicade started, no s/s of reaction.  Carafate started, states was easier to swallow a routine/schedule  - Consider collaborating with pharmacy to review patient's medication profile  Outcome: Progressing  Goal: Maintains adequate nutritional intake (undernourished)  Description: INTERVENTIONS:  - Monitor percentage of each meal consumed  -

## 2020-11-05 NOTE — CM/SW NOTE
ANDRES received confirmation from RN that pt expected to discharge home today with request for [facility] visit tomorrow 11/6 for dressing change/wound care.    ANDRES spoke with Javier at [facility] who was alerted of expected d/c today and visit for tomorr

## 2020-11-05 NOTE — DISCHARGE SUMMARY
[facility]OSPITAL[facility]  DISCHARGE SUMMARY     Lenny Corbett Patient Status:  Inpatient    10/11/1960 MRN [id number]   [facility] 1SE-B Attending Dilcia Duke MD   [facility] Day # 4 PCP Bridgette Carvalho DO     Date of Admission: 2020  Date presented with abdominal pain. Recently completed course of long-term antibiotics for left foot infection. CT the abdomen pelvis showed thickening of the small bowel and colon and also incidentally noted new liver lobe mass.   He was positive for C. diffi blunting.  -Negative for dysplasia or malignancy.     B. Random stomach:  -Strips of antral and fundic mucosa showing mild chronic inactive gastritis.   -Negative for Helicobacter pylori.  -No evidence of intestinal metaplasia, dysplasia or malignancy.    Take 50 mg by mouth daily. Refills: 0     Tab-A-Radha Tabs      Take 1 tablet by mouth daily. Refills: 0     traMADol HCl 50 MG Tabs  Commonly known as: ULTRAM      Take 1 tablet (50 mg total) by mouth every 8 (eight) hours as needed.    Quantity: 30 pleasant  Respiratory: Clear to auscultation bilaterally. No wheezes. No rhonchi. Cardiovascular: S1, S2. Regular rate and rhythm. Abdomen: Soft, nontender, nondistended. Positive bowel sounds. No rebound or guarding.   Neurologic: No focal neurological

## 2020-11-05 NOTE — PROGRESS NOTES
INFECTIOUS DISEASE CONSULT NOTE    Jem Kearns Patient Status:  Inpatient    10/11/1960 MRN [id number]   [facility] 4NW-A Attending Charlotte Evans MD   Hosp Day # 4 PCP Anastasiya Leon Performed by Shelby Bailey MD at [facility] ENDOSCOPY   • ESOPHAGOGASTRODUODENOSCOPY (EGD) N/A 8/23/2020    Performed by Rosmery Narvaez MD at [address] [place] [address] Left 10/2/2020    Performed by Rolando Ayala DPM Normal conjunctiva  Neck: No lymphadenopathy. Supple. Respiratory: Clear to auscultation bilaterally. No wheezes. No rhonchi.   Cardiovascular: RRR  Abdomen: Soft, NT, ND, normoactive BS  Musculoskeletal: No edema noted  Integument: large ulceration of d

## 2020-11-05 NOTE — PROGRESS NOTES
NURSING DISCHARGE NOTE    Discharged Home via Wheelchair. Accompanied by Spouse  Belongings Taken by patient/family. Pt AOx4. VSS. Tolerating diet well. No c/o pain. No s/s of bleeding. Had two darker, soft stools this AM. No vomiting.  Ok'd for d/c

## 2020-11-05 NOTE — PROGRESS NOTES
[facility] Gastroenterology Progress Note    CC: abdominal pain, diarrhea     S: Pt with improved abdominal pain and odynophagia; wants to go home       O: /68 (BP Location: Right arm)   Pulse 78   Temp 99.4 °F (37.4 °C) (Oral)   Resp 18   Ht 71

## 2020-11-05 NOTE — PROGRESS NOTES
Hematology/Oncology Progress Note    Patient Name: Haleigh Birmingham Record Number: [id number]    YOB: 1960     Reason for Consultation:  Jen Gordon was seen today for the diagnosis of [address] diarrhea, NSCLC    Interval events: Danika Bello WBC 6.1 3.7* 3.1*   HGB 10.9* 10.4* 11.1*   HCT 31.5* 30.6* 33.6*   PLT 58.0* 60.0* 60.0*   MCV 97.5 99.4 100.6*   RDW 14.5 14.4 14.3   NEPRELIM 4.47 2.47 1.92       Recent Labs   Lab 11/01/20  1330 11/01/20  1330 11/03/20  0953 11/04/20  0747 11/05/20 There is recanalization of the umbilical vein. There are varices in the gastrohepatic ligament and splenic hilum. RETROPERITONEUM:  Numerous retroperitoneal, periportal and portacaval lymph nodes appear relatively stable.   Celiac lymph node is stable mickey Khadijah portal, celiac and portacaval lymph nodes unchanged. Masslike area arising from the caudate lobe of the liver measuring 6 cm. Hepatic neoplasm/ metastases should be excluded.   Additional area in the right lobe of the liver previously noted is Catherine Benjamin effects.     *Cirrhosis- due to HCV, EtOH abuse  -have been holding Lasix 40mg BID + spironolactone 50mg daily for now d/t diarrhea. However can resume on discharge home today if tolerates diet      *NSCLC, squamous cell type.   R hilar lymphadenopathy  -i

## 2020-11-05 NOTE — PROGRESS NOTES
[facility]  Progress Note     Feng Sesay Patient Status:  Inpatient    10/11/1960 MRN [id number]   [facility] 4NW-A Attending Mina Reynolds MD   [facility] Day # 4 PCP Veronica Darling DO     Chief Complaint: Cdiff    S: Patient do --   --  31   BILT 3.4*  --   --   --  2.2*   TP 6.6  --   --   --  5.5*    < > = values in this interval not displayed. Estimated Creatinine Clearance: 158.9 mL/min (A) (based on SCr of 0.52 mg/dL (L)).     Recent Labs   Lab 11/03/20  0953   PTP 20 Home    Plan of care discussed with patient, PA and RN.     Cristin Chow MD

## 2020-11-06 NOTE — PAYOR COMM NOTE
--------------  DISCHARGE REVIEW    Payor: [address] #:  [id number]  Authorization Number: [id number]    Admit date: 11/1/20  Admit time:  2142  Discharge Date: 11/5/2020  2:26 PM     Admitting Physician: Tiburcio Lion enteritis/colitis now on prednisone), alcoholic cirrhosis, HTN, recent necrotizing soft tissue infection of L foot who presents with diffuse abdominal pain associated with N/V.  Symptoms started yesterday, experienced sudden onset abdominal \"fullness\" geoffrey Risk  29-58 Medium Risk  0-28   Low Risk         TCM Follow-Up Recommendation:  LACE > 58:  High Risk of readmission after discharge from the hospital.    Procedures during hospitalization:   PREOPERATIVE DIAGNOSIS/INDICATION: Abdominal pain, nausea, abnorm CONTINUE taking these medications      Instructions Prescription details   acetaminophen 500 MG Tabs  Commonly known as: TYLENOL EXTRA STRENGTH      Take 500-1,000 mg by mouth 2 (two) times daily as needed for Pain.    Refills: 0     carvedilol 12.5 MG Ta possible treatment    Dawna Kc, [address] [facility] Jeremy [phone]    In 2 weeks  for follow up on GI issues    [place] [address] [address] Tammy Cormier  Go on 11/12/2020

## 2020-11-06 NOTE — TELEPHONE ENCOUNTER
Pt discharged 11-5-20, C diff colitis. Pt is in current TCM episode and does not have HFU appt scheduled at this time. HFU appt (#1476) recommended by 11-12-20 as pt is a high risk for readmission. Please discuss with PCP and contact pt accordingly.

## 2020-11-06 NOTE — TELEPHONE ENCOUNTER
Spoke to patient. States he does not feel he needs appointment. He has f/u with skin graft physician on Monday at 12:00, f/u with GI doctor for CDiff in 10 days. He is passing solid stools and feels good. Please advise.

## 2020-11-06 NOTE — PROGRESS NOTES
LMTCB for post hospital follow up. NCM contact information provided. [facility] sent TE to PCP office re: assistance in scheduling HFU appt.

## 2020-11-09 NOTE — PROGRESS NOTES
Initial Post Discharge Follow Up   Discharge Date: 11/5/20  Contact Date: 11/6/2020    Consent Verification:  Assessment Completed With: Patient  HIPAA Verified? Yes    Discharge Dx:     1.  Abdominal pain with nausea and vomiting in setting of recent e any questions about your discharge instructions? No  • Before leaving the hospital was your diagnoses explained to you? Yes  • Do you have any questions about your diagnoses?  No  • Are you able to perform normal daily activities of living as you have prio go over your medications with you to make sure we are not missing anything?yes, patient reports that he is taking all his medications, he started the new ones and the other medications remain the same.    • Are there any reasons that keep you from taking yo 7-14 days no     NCM Reviewed/scheduled/rescheduled PCP TCM/HFU appointment with pt:  Yes, NCM attempted to schedule a HFU non-TCM as patient is in a TCM episode at present.  Patient states he has to follow up with the specialists first. Message sent to MD'

## 2020-11-09 NOTE — H&P (VIEW-ONLY)
Jules Sánchez is a 63year old male that presents with Patient presents with:  Wound Care: Left foot  .     REFERRED BY:  Dr. Genet Abebe    Pacemaker: No  Latex Allergy: no  Coumadin: No  Plavix: No  Other anticoagulants: No  Cardiac stents: No    HAND DOMIN Elevated liver function tests 7/24/2013   • Family history of bladder cancer 9/23/2014   • Fatigue    • Flatulence/gas pain/belching    • Hepatitis    • Hepatocellular carcinoma ([facility] 3/20/2019   • Hypoalbuminemia due to protein-calorie malnutrition ([facility] meals. Take one tablet (40 mg total) by mouth once daily, 30 minutes prior to breakfast. 90 tablet 3   • vancomycin HCl 125 MG Oral Cap Take 1 capsule (125 mg total) by mouth 4 (four) times daily for 10 days.  40 capsule 0   • acetaminophen 500 MG Oral Tab Normal, Effort - Normal  CARDIOVASCULAR: Regular rhythm, No murmurs  ABDOMEN: Inspection - Normal, No abdominal tenderness  NEURO: Memory intact  PSYCH: Oriented to person, place, time, and situation, Appropriate mood and affect      Physical Exam:       R

## 2020-11-09 NOTE — TELEPHONE ENCOUNTER
Patient was discharged from [facility] on 11/5/2020 after being readmitted and is at [address] for readmission and recommended to have an HFU non-TCM as he is already in a TCM episode.  NCM attempted to schedule a HFU non-TCM as patient is in a TCM epi

## 2020-11-09 NOTE — PROGRESS NOTES
Per Dr. Tarah Das' evaluation, applied a saline wet to dry dressing and spandage to patient's left foot. Pt verbalized dressings fits comfortably. Patient request for surgery signed by patient and witnessed and signed by RN.   Prescription for Norco an

## 2020-11-09 NOTE — TELEPHONE ENCOUNTER
We actually spoke with the patient on 11/6/2020 at which time I stated it was okay for patient to decline TCM visit.

## 2020-11-09 NOTE — H&P
Nick Carreon is a 63year old male that presents with Patient presents with:  Wound Care: Left foot  .     REFERRED BY:  Dr. Alivia Wasserman    Pacemaker: No  Latex Allergy: no  Coumadin: No  Plavix: No  Other anticoagulants: No  Cardiac stents: No    HAND DOMIN Elevated liver function tests 7/24/2013   • Family history of bladder cancer 9/23/2014   • Fatigue    • Flatulence/gas pain/belching    • Hepatitis    • Hepatocellular carcinoma ([facility] 3/20/2019   • Hypoalbuminemia due to protein-calorie malnutrition ([facility] meals. Take one tablet (40 mg total) by mouth once daily, 30 minutes prior to breakfast. 90 tablet 3   • vancomycin HCl 125 MG Oral Cap Take 1 capsule (125 mg total) by mouth 4 (four) times daily for 10 days.  40 capsule 0   • acetaminophen 500 MG Oral Tab Normal, Effort - Normal  CARDIOVASCULAR: Regular rhythm, No murmurs  ABDOMEN: Inspection - Normal, No abdominal tenderness  NEURO: Memory intact  PSYCH: Oriented to person, place, time, and situation, Appropriate mood and affect      Physical Exam:       R

## 2020-11-10 NOTE — ANESTHESIA POSTPROCEDURE EVALUATION
Patient: Rowena Castellanos    Procedure Summary     Date: 11/10/20 Room / Location: [address] [place] [address] MAIN OR 01 / [address] [facility] MAIN OR    Anesthesia Start: 2517 Anesthesia Stop: 1711    Procedure: LESION WIDE EXCISION WITH SKIN GRAFT (Left ) Diagnosis:       Open wound of left fo

## 2020-11-10 NOTE — INTERVAL H&P NOTE
Pre-op Diagnosis: Open wound of left foot [S91.302A]    The above referenced H&P was reviewed by Maxim Gallagher MD on 11/10/2020, the patient was examined and no significant changes have occurred in the patient's condition since the H&P was perform

## 2020-11-10 NOTE — OPERATIVE REPORT
[facility]    PATIENT'S NAME: Caledonia Lafferty   ATTENDING PHYSICIAN: Anushka Helm MD   OPERATING PHYSICIAN: Anushka Helm MD   PATIENT ACCOUNT#:   [id number]    LOCATION:  [place] [address]  MEDICAL RECORD #:   [id number] operating suite in satisfactory condition. Dictated By Johanne Godinez MD  d: 11/10/2020 16:02:14  t: 11/10/2020 17:31:36  [facility] [id number]  [facility]/    cc: MD Vaishali Spears DPM

## 2020-11-10 NOTE — BRIEF OP NOTE
Pre-Operative Diagnosis: Open wound of left foot [S91.302A]     Post-Operative Diagnosis: Open wound of left foot [S91.302A]      Procedure Performed:   Procedure(s):  DEBRIDEMENT LEFT FOOT, SPLIT THICKNESS SKIN GRAFT FROM LEFT THIGH    Surgeon(s) and Role

## 2020-11-10 NOTE — ANESTHESIA PREPROCEDURE EVALUATION
Anesthesia PreOp Note    HPI:     Samaria Ryan is a 63year old male who presents for preoperative consultation requested by: Roney Tomlin MD    Date of Surgery: 11/10/2020    Procedure(s):  LESION WIDE EXCISION WITH SKIN GRAFT  Indication: Op 08/14/2020      Coagulopathy ([address]         Date Noted: 06/17/2020      Rib pain on left side         Date Noted: 04/22/2020      RUQ abdominal pain         Date Noted: 02/28/2020      Coccydynia         Date Noted: 01/17/2020      Pain, dental         Date Elevated liver function tests         Date Noted: 07/24/2013        Past Medical History:   Diagnosis Date   • Abdominal distention August 2020   • Abdominal pain    • Alcoholism /alcohol abuse ([facility] 9/23/2014   • Black stools    • Bloating August 2020   • VARICOSE VEIN ENDOVENOUS LASER ABLATION(CPT=36478)  2018   • NEEDLE BIOPSY LIVER  August 2020         •  sucralfate 1 GM/10ML Oral Suspension, Take 10 mL (1 g total) by mouth 4 (four) times daily before meals and nightly., Disp: 1 Bottle, Rfl: 1, 11/9/2020 Social History    Socioeconomic History      Marital status: Single      Spouse name: Not on file      Number of children: Not on file      Years of education: Not on file      Highest education level: Not on file    Occupational History      Not on fi daily        Occupational Exposure: Not Asked        Hobby Hazards: Not Asked        Sleep Concern: Not Asked        Stress Concern: Not Asked        Weight Concern: Not Asked        Special Diet: Not Asked        Back Care: Not Asked        Exercise:  Yes Weight: 105.2 kg (232 lb) 100.4 kg (221 lb 7 oz)   Height: 1.803 m (5' 11\") 1.829 m (6')        Anesthesia Evaluation     Patient summary reviewed and Nursing notes reviewed    Airway   Mallampati: I  TM distance: >3 FB  Neck ROM: full  Dental    (+) up

## 2020-11-10 NOTE — ANESTHESIA PROCEDURE NOTES
Airway  Date/Time: 11/10/2020 2:12 PM  Urgency: Elective    Airway not difficult    General Information and Staff    Patient location during procedure: OR  Anesthesiologist: Yumiko Carter MD  Performed: anesthesiologist     Indications and Patient Condition

## 2020-11-13 NOTE — TELEPHONE ENCOUNTER
----- Message from Errol Omer DPM sent at 11/11/2020  4:07 PM CST -----  Results reviewed. Please inform patient that fungal culture results are negative and we should proceed with kerasol therapy.   Please tell the patient to purchase it at their

## 2020-11-18 NOTE — PROGRESS NOTES
Surgery 1: [facility]: L foot I&D / debridement  - Date: 10/02/20  - Days Since: 47    Surgery 2: L foot debridement / [facility]  - Date: 11/10/20  - Days Since: 8    Pt here for tie over removal to dorsal left foot.   Pt identified w/2 identifiers and orders verif

## 2020-11-24 NOTE — PROGRESS NOTES
Hematology/Oncology Clinic Follow Up Visit    Patient Name: Haleigh Birmingham Record Number: [id number]    YOB: 1960    PCP: Dr. Vanna Oneill   Other providers: Dr. López Galeano (liver)    Reason for Consultation:  Dominique mendez nivolumab  -6/7/19- cycle 6 nivolumab  -6/21/19- cycle 7 nivolumab  -6/28/19- CT c/a/p- Significant decrease in the size of the RUL lobe spiculated lung mass (21 x 23mm -> 11 x 14mm).   Decrease in size of heterogeneously enhancing mass in segment 5 of the significant radiographic differences noted despite dramatic response based on AFP levels and clear clinical improvement, therefore consensus is that his malignancy is not evaluable by imaging**  -1/13/20- CT chest- Relatively stable spiculated opacity abiola PET/CT- RUL lung mass with increased FDG uptake to SUV 11.2. Subcm mediastinal LNs without elevated FDG uptake. No other malignant appearing FDG uptake.   Note of new large amt of ascites with increase in size of collateral vessels in the upper abdomen, a Abdominal distention August 2020   • Abdominal pain    • Alcoholism /alcohol abuse ([facility] [address] 9/23/2014   • Black stools    • Bloating August 2020   • Blood in the stool    • Chronic cough 5/8/2015   • Cigarette smoker 5/8/2015    50 pack year h/o smoking    • ABLATION(CPT=36478)  2018   • LESION WIDE EXCISION WITH SKIN GRAFT Left 11/10/2020    Performed by Charli Green MD at [facility] OR   • NEEDLE BIOPSY LIVER  August 2020   • PREP SITE F/S/N/H/F/G/M/D GT 1ST 100 SQ CM/1PCT Left 11/10/2020    Debridem Cigarettes        Quit date: 2019        Years since quittin.7      Smokeless tobacco: Former User        Types: Snuff      Tobacco comment: 2ppd for 20yrs, 1ppd for 10 years, 10 cigs per day for the last few years    Alcohol use: No      Alcohol/ 11/24/2020    MCH 33.7 11/24/2020    MCHC 33.8 11/24/2020    RDW 14.4 11/24/2020    PLT 62.0 (L) 11/24/2020    PLT 60.0 (L) 11/05/2020    PLT 60.0 (L) 11/04/2020     Lab Results   Component Value Date     (H) 11/24/2020    BUN 9 11/24/2020    BUNCRE 86,412.7 (H) 03/16/2019    AFPTM 3,785.7 (H) 01/21/2019     Lab Results   Component Value Date    REITCPERCENT 3.0 (H) 09/03/2020    REITCPERCENT 1.4 08/14/2020    REITCPERCENT 2.0 06/07/2019    REITCPERCENT 2.0 12/17/2018     Lab Results   Component Value Component Value Date    T4F 1.0 12/04/2018    T4F 1.1 10/25/2016    T4F 1.2 07/19/2014    T4F 0.9 06/29/2013     Component      Latest Ref Rng & Units 6/17/2020   TEG Citrate Specimen       Other   Split Time      min 5.0   R Time      5.0 - 10.0 min 5.5 recurrence with esophagitis requiring infliximab.   Will not plan to resume immunotherapy unless develops clear recurrence of malignancy given his prior adverse side effects.  -will repeat CT c/a/p and labs in 1 month- order placed    *Cirrhosis- due to HCV

## 2020-11-24 NOTE — TELEPHONE ENCOUNTER
Received patient home health certification from [facility]. In red folder for review.  [phone]

## 2020-11-24 NOTE — PROGRESS NOTES
Outpatient Oncology Care Plan  Problem list:  pain  fatigue  knowledge deficit     Problems related to:    disease/disease progression  self care  side effect of treatment     Interventions:  provided general teaching     Expected outcomes:  understands pl

## 2020-11-24 NOTE — PROGRESS NOTES
Remicade infused per MD order w/out incident. Pt d/c home in stable condition, no new complaints. AVS provided.

## 2020-11-25 NOTE — PROGRESS NOTES
Surgery 1: Roscoe Arita: L foot I&D / debridement  - Date: 10/02/20  - Days Since: 54    Surgery 2: L foot debridement / STSG  - Date: 11/10/20  - Days Since: 15    Patient here for staple removal to left foot .   Patient identified with 2 identifiers and orders

## 2020-12-07 NOTE — PROGRESS NOTES
Surgery 1: Samuel Pucketttos: L foot I&D / debridement  - Date: 10/02/20  - Days Since: 66    Surgery 2: L foot debridement / STSG  - Date: 11/10/20  - Days Since: 27    Patient here for surgical follow-up of left foot debridement   States he's doing \"good\"  Compl

## 2020-12-07 NOTE — PROGRESS NOTES
Per Dr. Kelsey Glover' evaluation, patient instructed to begin Eucerin massages three times a day for 1 minute to left foot. Eucerin massage demonstrated to pt and \"After Skin Surgery\" pamphlet given.   Patient instructed on importance of sun block use for t

## 2020-12-15 NOTE — TELEPHONE ENCOUNTER
Called and spoke with pt he states he is feeling well. Eating and drinking. Denies any diarrhea. Denies any abdominal pain. States his only problem is his foot which is better and he is walking on it.  Reminded about appointment next week and to complete CT

## 2020-12-16 NOTE — PROGRESS NOTES
Nutrition screen complete as triggered by [facility] dx of [facility] lung cancer. Chart reviewed. Noted ~ 1 lb wt loss x 10 day; however, noted pt also on diuretics. Noted c/o esophagitis and epigastric pain w/ pt to resume PPI and has f/u w/ GI scheduled.  R

## 2020-12-21 NOTE — TELEPHONE ENCOUNTER
Feels fine, denies any cough or shortness of breath. No diarrhea, fevers or chills. Will come in tomorrow as scheduled.

## 2020-12-22 NOTE — PROGRESS NOTES
Hematology/Oncology Clinic Follow Up Visit    Patient Name: Haleigh Birmingham Record Number: [id number]    YOB: 1960    PCP: Dr. Anjelica Boyd   Other providers: Dr. Holly White (liver)    Reason for Consultation:  Estill Blizzard wa nivolumab  -6/7/19- cycle 6 nivolumab  -6/21/19- cycle 7 nivolumab  -6/28/19- CT c/a/p- Significant decrease in the size of the RUL lobe spiculated lung mass (21 x 23mm -> 11 x 14mm).   Decrease in size of heterogeneously enhancing mass in segment 5 of the significant radiographic differences noted despite dramatic response based on AFP levels and clear clinical improvement, therefore consensus is that his malignancy is not evaluable by imaging**  -1/13/20- CT chest- Relatively stable spiculated opacity abiola PET/CT- RUL lung mass with increased FDG uptake to SUV 11.2. Subcm mediastinal LNs without elevated FDG uptake. No other malignant appearing FDG uptake.   Note of new large amt of ascites with increase in size of collateral vessels in the upper abdomen, a Zechariah denies any cough, dyspnea, or chest pains. He has not had any fevers or any infections other than his recent left foot infection. The skin graft to his left foot has been healing well.   No longer needing significant wound care, remains off antibiot Unspecified essential hypertension    • Unspecified open wound, left foot, initial encounter 11/09/2020    left foot   • Visual impairment     glasses   • Vomiting    • Wears glasses 1980   • Weight loss      Past Surgical History:   Procedure Laterality D Tab, Take 1 mg by mouth daily. , Disp: , Rfl:     •  furosemide 40 MG Oral Tab, Take 1 tablet (40 mg total) by mouth 2 (two) times daily. , Disp: 60 tablet, Rfl: 6      Allergies:     Penicillins             HIVES    Psychosocial History:  Social History rhythm, no murmurs  Respiratory: Lungs clear to auscultation bilaterally  GI/Abd: nontender, no palpable abnormalities. No significant ascites. Normal bowel sounds. No hepatosplenomegaly.    Neurological: Grossly intact    Lymphatics: No palpable lymphade 03/27/2020    AFPTM 22.9 (H) 02/28/2020    AFPTM 19.9 (H) 01/31/2020    AFPTM 19.7 (H) 01/17/2020    AFPTM 19.9 (H) 01/03/2020    AFPTM 23.5 (H) 12/20/2019    AFPTM 19.2 (H) 12/06/2019    AFPTM 17.3 (H) 11/22/2019    AFPTM 17.3 (H) 11/08/2019    AFPTM 18. 7 12/17/2018     No results found for: [facility] [place]  Lab Results   Component Value Date    KAPPALTCHN 3.633 (H) 12/17/2018      Lab Results   Component Value Date    LAMBDALTCH 5.226 (H) 12/17/2018     Lab Results   Component Value Date    KAPLAMRATIO 0.70 12/17 requiring infliximab. Will not plan to resume immunotherapy unless develops clear recurrence of malignancy given his prior adverse side effects.     *Cirrhosis- due to HCV, EtOH abuse  -continue Lasix 40mg BID + spironolactone 50mg daily, continue to eleva

## 2020-12-23 PROBLEM — R91.8 GROUND GLASS OPACITY PRESENT ON IMAGING OF LUNG: Status: ACTIVE | Noted: 2020-01-01

## 2021-01-01 ENCOUNTER — TELEPHONE (OUTPATIENT)
Dept: HEMATOLOGY/ONCOLOGY | Facility: HOSPITAL | Age: 61
End: 2021-01-01

## 2021-01-01 ENCOUNTER — OFFICE VISIT (OUTPATIENT)
Dept: HEMATOLOGY/ONCOLOGY | Age: 61
End: 2021-01-01
Attending: INTERNAL MEDICINE
Payer: COMMERCIAL

## 2021-01-01 ENCOUNTER — APPOINTMENT (OUTPATIENT)
Dept: CT IMAGING | Facility: HOSPITAL | Age: 61
DRG: 423 | End: 2021-01-01
Attending: HOSPITALIST
Payer: COMMERCIAL

## 2021-01-01 ENCOUNTER — APPOINTMENT (OUTPATIENT)
Dept: ULTRASOUND IMAGING | Facility: HOSPITAL | Age: 61
DRG: 167 | End: 2021-01-01
Attending: HOSPITALIST
Payer: COMMERCIAL

## 2021-01-01 ENCOUNTER — APPOINTMENT (OUTPATIENT)
Dept: CT IMAGING | Age: 61
End: 2021-01-01
Attending: EMERGENCY MEDICINE
Payer: COMMERCIAL

## 2021-01-01 ENCOUNTER — SOCIAL WORK SERVICES (OUTPATIENT)
Dept: HEMATOLOGY/ONCOLOGY | Facility: HOSPITAL | Age: 61
End: 2021-01-01

## 2021-01-01 ENCOUNTER — APPOINTMENT (OUTPATIENT)
Dept: MRI IMAGING | Facility: HOSPITAL | Age: 61
DRG: 423 | End: 2021-01-01
Attending: INTERNAL MEDICINE
Payer: COMMERCIAL

## 2021-01-01 ENCOUNTER — APPOINTMENT (OUTPATIENT)
Dept: GENERAL RADIOLOGY | Age: 61
End: 2021-01-01
Attending: EMERGENCY MEDICINE
Payer: COMMERCIAL

## 2021-01-01 ENCOUNTER — ANESTHESIA EVENT (OUTPATIENT)
Dept: ENDOSCOPY | Facility: HOSPITAL | Age: 61
DRG: 167 | End: 2021-01-01
Payer: COMMERCIAL

## 2021-01-01 ENCOUNTER — APPOINTMENT (OUTPATIENT)
Dept: HEMATOLOGY/ONCOLOGY | Age: 61
End: 2021-01-01
Attending: INTERNAL MEDICINE
Payer: COMMERCIAL

## 2021-01-01 ENCOUNTER — HOSPITAL ENCOUNTER (INPATIENT)
Facility: HOSPITAL | Age: 61
LOS: 3 days | Discharge: HOME | DRG: 167 | End: 2021-01-01
Attending: EMERGENCY MEDICINE | Admitting: HOSPITALIST
Payer: COMMERCIAL

## 2021-01-01 ENCOUNTER — TELEPHONE (OUTPATIENT)
Dept: FAMILY MEDICINE CLINIC | Facility: CLINIC | Age: 61
End: 2021-01-01

## 2021-01-01 ENCOUNTER — ANESTHESIA (OUTPATIENT)
Dept: ENDOSCOPY | Facility: HOSPITAL | Age: 61
DRG: 167 | End: 2021-01-01
Payer: COMMERCIAL

## 2021-01-01 ENCOUNTER — HOSPITAL ENCOUNTER (OUTPATIENT)
Dept: NUCLEAR MEDICINE | Facility: HOSPITAL | Age: 61
Discharge: HOME | End: 2021-01-01
Attending: INTERNAL MEDICINE
Payer: COMMERCIAL

## 2021-01-01 ENCOUNTER — PATIENT OUTREACH (OUTPATIENT)
Dept: CASE MANAGEMENT | Age: 61
End: 2021-01-01

## 2021-01-01 ENCOUNTER — ANESTHESIA EVENT (OUTPATIENT)
Dept: ENDOSCOPY | Facility: HOSPITAL | Age: 61
DRG: 423 | End: 2021-01-01
Payer: COMMERCIAL

## 2021-01-01 ENCOUNTER — ANESTHESIA (OUTPATIENT)
Dept: ENDOSCOPY | Facility: HOSPITAL | Age: 61
DRG: 423 | End: 2021-01-01
Payer: COMMERCIAL

## 2021-01-01 ENCOUNTER — APPOINTMENT (OUTPATIENT)
Dept: CV DIAGNOSTICS | Facility: HOSPITAL | Age: 61
DRG: 167 | End: 2021-01-01
Attending: HOSPITALIST
Payer: COMMERCIAL

## 2021-01-01 ENCOUNTER — APPOINTMENT (OUTPATIENT)
Dept: CT IMAGING | Facility: HOSPITAL | Age: 61
DRG: 167 | End: 2021-01-01
Attending: INTERNAL MEDICINE
Payer: COMMERCIAL

## 2021-01-01 ENCOUNTER — APPOINTMENT (OUTPATIENT)
Dept: GENERAL RADIOLOGY | Facility: HOSPITAL | Age: 61
DRG: 423 | End: 2021-01-01
Attending: INTERNAL MEDICINE
Payer: COMMERCIAL

## 2021-01-01 ENCOUNTER — APPOINTMENT (OUTPATIENT)
Dept: INTERVENTIONAL RADIOLOGY/VASCULAR | Facility: HOSPITAL | Age: 61
DRG: 423 | End: 2021-01-01
Attending: INTERNAL MEDICINE
Payer: COMMERCIAL

## 2021-01-01 ENCOUNTER — APPOINTMENT (OUTPATIENT)
Dept: CT IMAGING | Facility: HOSPITAL | Age: 61
DRG: 423 | End: 2021-01-01
Attending: INTERNAL MEDICINE
Payer: COMMERCIAL

## 2021-01-01 ENCOUNTER — APPOINTMENT (OUTPATIENT)
Dept: GENERAL RADIOLOGY | Facility: HOSPITAL | Age: 61
DRG: 167 | End: 2021-01-01
Attending: INTERNAL MEDICINE
Payer: COMMERCIAL

## 2021-01-01 ENCOUNTER — HOSPITAL ENCOUNTER (INPATIENT)
Facility: HOSPITAL | Age: 61
LOS: 6 days | Discharge: HOSPICE | DRG: 423 | End: 2021-01-01
Attending: HOSPITALIST | Admitting: HOSPITALIST
Payer: COMMERCIAL

## 2021-01-01 ENCOUNTER — APPOINTMENT (OUTPATIENT)
Dept: GENERAL RADIOLOGY | Facility: HOSPITAL | Age: 61
DRG: 167 | End: 2021-01-01
Attending: EMERGENCY MEDICINE
Payer: COMMERCIAL

## 2021-01-01 ENCOUNTER — HOSPITAL ENCOUNTER (OUTPATIENT)
Dept: INTERVENTIONAL RADIOLOGY/VASCULAR | Facility: HOSPITAL | Age: 61
Discharge: HOME | End: 2021-01-01
Attending: INTERNAL MEDICINE
Payer: COMMERCIAL

## 2021-01-01 ENCOUNTER — HOSPITAL ENCOUNTER (EMERGENCY)
Age: 61
Discharge: HOME | End: 2021-01-01
Attending: EMERGENCY MEDICINE
Payer: COMMERCIAL

## 2021-01-01 VITALS
RESPIRATION RATE: 18 BRPM | BODY MASS INDEX: 30.43 KG/M2 | HEART RATE: 76 BPM | HEIGHT: 70.98 IN | OXYGEN SATURATION: 96 % | TEMPERATURE: 99 F | SYSTOLIC BLOOD PRESSURE: 132 MMHG | WEIGHT: 217.38 LBS | DIASTOLIC BLOOD PRESSURE: 89 MMHG

## 2021-01-01 VITALS
HEART RATE: 76 BPM | BODY MASS INDEX: 29.02 KG/M2 | WEIGHT: 202.69 LBS | HEIGHT: 70 IN | RESPIRATION RATE: 21 BRPM | TEMPERATURE: 98 F | DIASTOLIC BLOOD PRESSURE: 56 MMHG | SYSTOLIC BLOOD PRESSURE: 105 MMHG | OXYGEN SATURATION: 95 %

## 2021-01-01 VITALS
SYSTOLIC BLOOD PRESSURE: 103 MMHG | RESPIRATION RATE: 20 BRPM | BODY MASS INDEX: 29 KG/M2 | TEMPERATURE: 98 F | OXYGEN SATURATION: 96 % | DIASTOLIC BLOOD PRESSURE: 65 MMHG | HEART RATE: 70 BPM | WEIGHT: 201.81 LBS

## 2021-01-01 VITALS
HEIGHT: 70.98 IN | TEMPERATURE: 99 F | OXYGEN SATURATION: 93 % | WEIGHT: 213 LBS | HEART RATE: 80 BPM | BODY MASS INDEX: 29.82 KG/M2 | SYSTOLIC BLOOD PRESSURE: 109 MMHG | DIASTOLIC BLOOD PRESSURE: 68 MMHG | RESPIRATION RATE: 18 BRPM

## 2021-01-01 VITALS
OXYGEN SATURATION: 93 % | DIASTOLIC BLOOD PRESSURE: 62 MMHG | HEART RATE: 95 BPM | WEIGHT: 222 LBS | TEMPERATURE: 98 F | BODY MASS INDEX: 31 KG/M2 | SYSTOLIC BLOOD PRESSURE: 104 MMHG | RESPIRATION RATE: 16 BRPM

## 2021-01-01 VITALS
HEART RATE: 101 BPM | SYSTOLIC BLOOD PRESSURE: 108 MMHG | TEMPERATURE: 98 F | OXYGEN SATURATION: 91 % | HEIGHT: 70.98 IN | RESPIRATION RATE: 18 BRPM | DIASTOLIC BLOOD PRESSURE: 71 MMHG | BODY MASS INDEX: 30.89 KG/M2 | WEIGHT: 220.63 LBS

## 2021-01-01 VITALS
DIASTOLIC BLOOD PRESSURE: 81 MMHG | OXYGEN SATURATION: 99 % | HEART RATE: 77 BPM | RESPIRATION RATE: 22 BRPM | WEIGHT: 216 LBS | TEMPERATURE: 99 F | HEIGHT: 71 IN | BODY MASS INDEX: 30.24 KG/M2 | SYSTOLIC BLOOD PRESSURE: 119 MMHG

## 2021-01-01 DIAGNOSIS — D61.818 PANCYTOPENIA (HCC): ICD-10-CM

## 2021-01-01 DIAGNOSIS — A04.72 CLOSTRIDIUM DIFFICILE COLITIS: ICD-10-CM

## 2021-01-01 DIAGNOSIS — J18.9 PNEUMONITIS: ICD-10-CM

## 2021-01-01 DIAGNOSIS — K74.60 CHRONIC HEPATITIS C WITH CIRRHOSIS (HCC): ICD-10-CM

## 2021-01-01 DIAGNOSIS — R06.00 ACUTE DYSPNEA: ICD-10-CM

## 2021-01-01 DIAGNOSIS — C22.0 HEPATOCELLULAR CARCINOMA (HCC): Primary | ICD-10-CM

## 2021-01-01 DIAGNOSIS — J18.9 PNEUMONIA OF BOTH LUNGS DUE TO INFECTIOUS ORGANISM, UNSPECIFIED PART OF LUNG: ICD-10-CM

## 2021-01-01 DIAGNOSIS — B18.2 CHRONIC HEPATITIS C WITH CIRRHOSIS (HCC): ICD-10-CM

## 2021-01-01 DIAGNOSIS — K20.80 ESOPHAGITIS DUE TO DRUG: ICD-10-CM

## 2021-01-01 DIAGNOSIS — C22.0 HEPATOCELLULAR CARCINOMA (HCC): ICD-10-CM

## 2021-01-01 DIAGNOSIS — C34.91 PRIMARY LUNG SQUAMOUS CELL CARCINOMA, RIGHT (HCC): ICD-10-CM

## 2021-01-01 DIAGNOSIS — R60.0 BILATERAL LOWER EXTREMITY EDEMA: ICD-10-CM

## 2021-01-01 DIAGNOSIS — C34.91 PRIMARY LUNG SQUAMOUS CELL CARCINOMA, RIGHT (HCC): Primary | ICD-10-CM

## 2021-01-01 DIAGNOSIS — R91.8 GROUND GLASS OPACITY PRESENT ON IMAGING OF LUNG: ICD-10-CM

## 2021-01-01 DIAGNOSIS — G89.3 CANCER ASSOCIATED PAIN: ICD-10-CM

## 2021-01-01 DIAGNOSIS — T36.4X5A ESOPHAGITIS DUE TO DRUG: ICD-10-CM

## 2021-01-01 DIAGNOSIS — Z02.9 ENCOUNTERS FOR UNSPECIFIED ADMINISTRATIVE PURPOSE: ICD-10-CM

## 2021-01-01 DIAGNOSIS — K52.1: ICD-10-CM

## 2021-01-01 DIAGNOSIS — E80.6 HYPERBILIRUBINEMIA: ICD-10-CM

## 2021-01-01 DIAGNOSIS — D68.9 COAGULOPATHY (HCC): Primary | ICD-10-CM

## 2021-01-01 DIAGNOSIS — K70.31 ASCITES DUE TO ALCOHOLIC CIRRHOSIS (HCC): ICD-10-CM

## 2021-01-01 DIAGNOSIS — D69.6 THROMBOCYTOPENIA (HCC): ICD-10-CM

## 2021-01-01 DIAGNOSIS — C34.91 PRIMARY SQUAMOUS CELL CARCINOMA OF LUNG, RIGHT (HCC): ICD-10-CM

## 2021-01-01 DIAGNOSIS — F10.11 ALCOHOL ABUSE, IN REMISSION: ICD-10-CM

## 2021-01-01 DIAGNOSIS — E87.6 HYPOKALEMIA: ICD-10-CM

## 2021-01-01 DIAGNOSIS — J18.9 PNEUMONITIS: Primary | ICD-10-CM

## 2021-01-01 DIAGNOSIS — J18.9 PNEUMONIA OF BOTH LUNGS DUE TO INFECTIOUS ORGANISM, UNSPECIFIED PART OF LUNG: Primary | ICD-10-CM

## 2021-01-01 DIAGNOSIS — K74.60 CHRONIC HEPATITIS C WITH CIRRHOSIS (HCC): Primary | ICD-10-CM

## 2021-01-01 DIAGNOSIS — D68.9 COAGULOPATHY (HCC): ICD-10-CM

## 2021-01-01 DIAGNOSIS — C34.90 METASTATIC NON-SMALL CELL LUNG CANCER (HCC): ICD-10-CM

## 2021-01-01 DIAGNOSIS — B18.2 CHRONIC HEPATITIS C WITH CIRRHOSIS (HCC): Primary | ICD-10-CM

## 2021-01-01 DIAGNOSIS — K70.31 ALCOHOLIC CIRRHOSIS OF LIVER WITH ASCITES (HCC): ICD-10-CM

## 2021-01-01 LAB
AFP-TM SERPL-MCNC: 31.9 NG/ML (ref ?–8)
AFP-TM SERPL-MCNC: 32.6 NG/ML (ref ?–8)
ALBUMIN SERPL-MCNC: 1.6 G/DL (ref 3.4–5)
ALBUMIN SERPL-MCNC: 1.7 G/DL (ref 3.4–5)
ALBUMIN SERPL-MCNC: 1.8 G/DL (ref 3.4–5)
ALBUMIN SERPL-MCNC: 1.9 G/DL (ref 3.4–5)
ALBUMIN SERPL-MCNC: 2.1 G/DL (ref 3.4–5)
ALBUMIN SERPL-MCNC: 2.3 G/DL (ref 3.4–5)
ALBUMIN SERPL-MCNC: 2.3 G/DL (ref 3.4–5)
ALBUMIN SERPL-MCNC: 2.4 G/DL (ref 3.4–5)
ALBUMIN SERPL-MCNC: 2.5 G/DL (ref 3.4–5)
ALBUMIN SERPL-MCNC: 2.5 G/DL (ref 3.4–5)
ALBUMIN/GLOB SERPL: 0.3 {RATIO} (ref 1–2)
ALBUMIN/GLOB SERPL: 0.4 {RATIO} (ref 1–2)
ALBUMIN/GLOB SERPL: 0.5 {RATIO} (ref 1–2)
ALP LIVER SERPL-CCNC: 108 U/L
ALP LIVER SERPL-CCNC: 122 U/L
ALP LIVER SERPL-CCNC: 146 U/L
ALP LIVER SERPL-CCNC: 150 U/L
ALP LIVER SERPL-CCNC: 153 U/L
ALP LIVER SERPL-CCNC: 166 U/L
ALP LIVER SERPL-CCNC: 171 U/L
ALP LIVER SERPL-CCNC: 172 U/L
ALP LIVER SERPL-CCNC: 173 U/L
ALP LIVER SERPL-CCNC: 187 U/L
ALP LIVER SERPL-CCNC: 187 U/L
ALP LIVER SERPL-CCNC: 191 U/L
ALP LIVER SERPL-CCNC: 195 U/L
ALP LIVER SERPL-CCNC: 196 U/L
ALT SERPL-CCNC: 21 U/L
ALT SERPL-CCNC: 21 U/L
ALT SERPL-CCNC: 30 U/L
ALT SERPL-CCNC: 34 U/L
ALT SERPL-CCNC: 43 U/L
ALT SERPL-CCNC: 45 U/L
ALT SERPL-CCNC: 46 U/L
ALT SERPL-CCNC: 46 U/L
ALT SERPL-CCNC: 47 U/L
ALT SERPL-CCNC: 50 U/L
ALT SERPL-CCNC: 51 U/L
ALT SERPL-CCNC: 65 U/L
ALT SERPL-CCNC: 66 U/L
ALT SERPL-CCNC: 69 U/L
AMMONIA PLAS-MCNC: 17 UMOL/L (ref 11–32)
ANION GAP SERPL CALC-SCNC: 10 MMOL/L (ref 0–18)
ANION GAP SERPL CALC-SCNC: 11 MMOL/L (ref 0–18)
ANION GAP SERPL CALC-SCNC: 13 MMOL/L (ref 0–18)
ANION GAP SERPL CALC-SCNC: 15 MMOL/L (ref 0–18)
ANION GAP SERPL CALC-SCNC: 4 MMOL/L (ref 0–18)
ANION GAP SERPL CALC-SCNC: 5 MMOL/L (ref 0–18)
ANION GAP SERPL CALC-SCNC: 6 MMOL/L (ref 0–18)
ANION GAP SERPL CALC-SCNC: 7 MMOL/L (ref 0–18)
ANION GAP SERPL CALC-SCNC: 8 MMOL/L (ref 0–18)
ANION GAP SERPL CALC-SCNC: 8 MMOL/L (ref 0–18)
AST SERPL-CCNC: 102 U/L (ref 15–37)
AST SERPL-CCNC: 120 U/L (ref 15–37)
AST SERPL-CCNC: 135 U/L (ref 15–37)
AST SERPL-CCNC: 210 U/L (ref 15–37)
AST SERPL-CCNC: 214 U/L (ref 15–37)
AST SERPL-CCNC: 217 U/L (ref 15–37)
AST SERPL-CCNC: 29 U/L (ref 15–37)
AST SERPL-CCNC: 30 U/L (ref 15–37)
AST SERPL-CCNC: 38 U/L (ref 15–37)
AST SERPL-CCNC: 42 U/L (ref 15–37)
AST SERPL-CCNC: 65 U/L (ref 15–37)
AST SERPL-CCNC: 76 U/L (ref 15–37)
AST SERPL-CCNC: 95 U/L (ref 15–37)
AST SERPL-CCNC: 99 U/L (ref 15–37)
ATRIAL RATE: 104 BPM
ATRIAL RATE: 71 BPM
ATRIAL RATE: 78 BPM
BASOPHIL BRONCHIAL WASHING: 0 %
BASOPHILS # BLD AUTO: 0.01 X10(3) UL (ref 0–0.2)
BASOPHILS # BLD AUTO: 0.02 X10(3) UL (ref 0–0.2)
BASOPHILS NFR BLD AUTO: 0.1 %
BASOPHILS NFR BLD AUTO: 0.2 %
BASOPHILS NFR BLD AUTO: 0.2 %
BASOPHILS NFR BLD AUTO: 0.3 %
BASOPHILS NFR BLD AUTO: 0.3 %
BASOPHILS NFR BLD AUTO: 0.5 %
BASOPHILS NFR BLD AUTO: 0.6 %
BASOPHILS NFR BLD AUTO: 0.6 %
BILIRUB DIRECT SERPL-MCNC: 3.8 MG/DL (ref 0–0.2)
BILIRUB SERPL-MCNC: 1.5 MG/DL (ref 0.1–2)
BILIRUB SERPL-MCNC: 1.5 MG/DL (ref 0.1–2)
BILIRUB SERPL-MCNC: 10.9 MG/DL (ref 0.1–2)
BILIRUB SERPL-MCNC: 12.3 MG/DL (ref 0.1–2)
BILIRUB SERPL-MCNC: 14.6 MG/DL (ref 0.1–2)
BILIRUB SERPL-MCNC: 15.5 MG/DL (ref 0.1–2)
BILIRUB SERPL-MCNC: 2.2 MG/DL (ref 0.1–2)
BILIRUB SERPL-MCNC: 2.4 MG/DL (ref 0.1–2)
BILIRUB SERPL-MCNC: 2.6 MG/DL (ref 0.1–2)
BILIRUB SERPL-MCNC: 3.3 MG/DL (ref 0.1–2)
BILIRUB SERPL-MCNC: 5.9 MG/DL (ref 0.1–2)
BILIRUB SERPL-MCNC: 6.2 MG/DL (ref 0.1–2)
BILIRUB SERPL-MCNC: 7.2 MG/DL (ref 0.1–2)
BILIRUB SERPL-MCNC: 8.6 MG/DL (ref 0.1–2)
BILIRUB UR QL STRIP.AUTO: NEGATIVE
BUN BLD-MCNC: 10 MG/DL (ref 7–18)
BUN BLD-MCNC: 11 MG/DL (ref 7–18)
BUN BLD-MCNC: 13 MG/DL (ref 7–18)
BUN BLD-MCNC: 13 MG/DL (ref 7–18)
BUN BLD-MCNC: 14 MG/DL (ref 7–18)
BUN BLD-MCNC: 14 MG/DL (ref 7–18)
BUN BLD-MCNC: 20 MG/DL (ref 7–18)
BUN BLD-MCNC: 24 MG/DL (ref 7–18)
BUN BLD-MCNC: 24 MG/DL (ref 7–18)
BUN BLD-MCNC: 27 MG/DL (ref 7–18)
BUN BLD-MCNC: 32 MG/DL (ref 7–18)
BUN BLD-MCNC: 8 MG/DL (ref 7–18)
BUN/CREAT SERPL: 15.4 (ref 10–20)
BUN/CREAT SERPL: 16.1 (ref 10–20)
BUN/CREAT SERPL: 16.9 (ref 10–20)
BUN/CREAT SERPL: 17.2 (ref 10–20)
BUN/CREAT SERPL: 17.3 (ref 10–20)
BUN/CREAT SERPL: 18.2 (ref 10–20)
BUN/CREAT SERPL: 18.3 (ref 10–20)
BUN/CREAT SERPL: 20.6 (ref 10–20)
BUN/CREAT SERPL: 23 (ref 10–20)
BUN/CREAT SERPL: 23.9 (ref 10–20)
BUN/CREAT SERPL: 28.6 (ref 10–20)
BUN/CREAT SERPL: 29.4 (ref 10–20)
BUN/CREAT SERPL: 31.2 (ref 10–20)
BUN/CREAT SERPL: 33 (ref 10–20)
BUN/CREAT SERPL: 9.9 (ref 10–20)
CALCIUM BLD-MCNC: 7.8 MG/DL (ref 8.5–10.1)
CALCIUM BLD-MCNC: 8.1 MG/DL (ref 8.5–10.1)
CALCIUM BLD-MCNC: 8.2 MG/DL (ref 8.5–10.1)
CALCIUM BLD-MCNC: 8.3 MG/DL (ref 8.5–10.1)
CALCIUM BLD-MCNC: 8.4 MG/DL (ref 8.5–10.1)
CALCIUM BLD-MCNC: 8.4 MG/DL (ref 8.5–10.1)
CALCIUM BLD-MCNC: 8.7 MG/DL (ref 8.5–10.1)
CALCIUM BLD-MCNC: 8.9 MG/DL (ref 8.5–10.1)
CEA SERPL-MCNC: 3.6 NG/ML (ref ?–5)
CHLORIDE SERPL-SCNC: 102 MMOL/L (ref 98–112)
CHLORIDE SERPL-SCNC: 103 MMOL/L (ref 98–112)
CHLORIDE SERPL-SCNC: 105 MMOL/L (ref 98–112)
CHLORIDE SERPL-SCNC: 106 MMOL/L (ref 98–112)
CHLORIDE SERPL-SCNC: 93 MMOL/L (ref 98–112)
CHLORIDE SERPL-SCNC: 93 MMOL/L (ref 98–112)
CHLORIDE SERPL-SCNC: 94 MMOL/L (ref 98–112)
CHLORIDE SERPL-SCNC: 95 MMOL/L (ref 98–112)
CHLORIDE SERPL-SCNC: 96 MMOL/L (ref 98–112)
CHLORIDE SERPL-SCNC: 97 MMOL/L (ref 98–112)
CHLORIDE SERPL-SCNC: 97 MMOL/L (ref 98–112)
CLARITY UR REFRACT.AUTO: CLEAR
CO2 SERPL-SCNC: 21 MMOL/L (ref 21–32)
CO2 SERPL-SCNC: 21 MMOL/L (ref 21–32)
CO2 SERPL-SCNC: 22 MMOL/L (ref 21–32)
CO2 SERPL-SCNC: 22 MMOL/L (ref 21–32)
CO2 SERPL-SCNC: 23 MMOL/L (ref 21–32)
CO2 SERPL-SCNC: 23 MMOL/L (ref 21–32)
CO2 SERPL-SCNC: 24 MMOL/L (ref 21–32)
CO2 SERPL-SCNC: 25 MMOL/L (ref 21–32)
CO2 SERPL-SCNC: 26 MMOL/L (ref 21–32)
CO2 SERPL-SCNC: 27 MMOL/L (ref 21–32)
CO2 SERPL-SCNC: 28 MMOL/L (ref 21–32)
COLOR UR AUTO: YELLOW
CREAT BLD-MCNC: 0.58 MG/DL
CREAT BLD-MCNC: 0.59 MG/DL
CREAT BLD-MCNC: 0.6 MG/DL
CREAT BLD-MCNC: 0.61 MG/DL
CREAT BLD-MCNC: 0.62 MG/DL
CREAT BLD-MCNC: 0.63 MG/DL
CREAT BLD-MCNC: 0.65 MG/DL
CREAT BLD-MCNC: 0.68 MG/DL
CREAT BLD-MCNC: 0.75 MG/DL
CREAT BLD-MCNC: 0.77 MG/DL
CREAT BLD-MCNC: 0.77 MG/DL
CREAT BLD-MCNC: 0.81 MG/DL
CREAT BLD-MCNC: 0.84 MG/DL
CREAT BLD-MCNC: 0.97 MG/DL
CREAT BLD-MCNC: 1.13 MG/DL
CREAT UR-SCNC: <13 MG/DL
CRP SERPL-MCNC: 11.1 MG/DL (ref ?–0.3)
CRP SERPL-MCNC: 12.8 MG/DL (ref ?–0.3)
CRP SERPL-MCNC: 3.38 MG/DL (ref ?–0.3)
CRP SERPL-MCNC: 6.71 MG/DL (ref ?–0.3)
CRP SERPL-MCNC: 8.48 MG/DL (ref ?–0.3)
CRP SERPL-MCNC: 8.52 MG/DL (ref ?–0.3)
CRP SERPL-MCNC: 9.67 MG/DL (ref ?–0.3)
D-DIMER: 2.99 UG/ML FEU (ref ?–0.61)
D-DIMER: 3.09 UG/ML FEU (ref ?–0.61)
D-DIMER: 8.96 UG/ML FEU (ref ?–0.61)
DEPRECATED HBV CORE AB SER IA-ACNC: 193.2 NG/ML
DEPRECATED HBV CORE AB SER IA-ACNC: 221.4 NG/ML
DEPRECATED RDW RBC AUTO: 49 FL (ref 35.1–46.3)
DEPRECATED RDW RBC AUTO: 49.9 FL (ref 35.1–46.3)
DEPRECATED RDW RBC AUTO: 50.4 FL (ref 35.1–46.3)
DEPRECATED RDW RBC AUTO: 50.6 FL (ref 35.1–46.3)
DEPRECATED RDW RBC AUTO: 52.1 FL (ref 35.1–46.3)
DEPRECATED RDW RBC AUTO: 52.1 FL (ref 35.1–46.3)
DEPRECATED RDW RBC AUTO: 53.8 FL (ref 35.1–46.3)
DEPRECATED RDW RBC AUTO: 58.9 FL (ref 35.1–46.3)
DEPRECATED RDW RBC AUTO: 60.5 FL (ref 35.1–46.3)
DEPRECATED RDW RBC AUTO: 61.7 FL (ref 35.1–46.3)
DEPRECATED RDW RBC AUTO: 65.4 FL (ref 35.1–46.3)
DEPRECATED RDW RBC AUTO: 67.7 FL (ref 35.1–46.3)
EOSINOPHIL # BLD AUTO: 0.03 X10(3) UL (ref 0–0.7)
EOSINOPHIL # BLD AUTO: 0.04 X10(3) UL (ref 0–0.7)
EOSINOPHIL # BLD AUTO: 0.05 X10(3) UL (ref 0–0.7)
EOSINOPHIL # BLD AUTO: 0.05 X10(3) UL (ref 0–0.7)
EOSINOPHIL # BLD AUTO: 0.06 X10(3) UL (ref 0–0.7)
EOSINOPHIL # BLD AUTO: 0.1 X10(3) UL (ref 0–0.7)
EOSINOPHIL # BLD AUTO: 0.13 X10(3) UL (ref 0–0.7)
EOSINOPHIL # BLD AUTO: 0.15 X10(3) UL (ref 0–0.7)
EOSINOPHIL # BLD AUTO: 0.16 X10(3) UL (ref 0–0.7)
EOSINOPHIL # BLD AUTO: 0.17 X10(3) UL (ref 0–0.7)
EOSINOPHIL # BLD AUTO: 0.17 X10(3) UL (ref 0–0.7)
EOSINOPHIL BRONCHIAL WASHING: 0 %
EOSINOPHIL NFR BLD AUTO: 0.3 %
EOSINOPHIL NFR BLD AUTO: 0.4 %
EOSINOPHIL NFR BLD AUTO: 0.6 %
EOSINOPHIL NFR BLD AUTO: 0.7 %
EOSINOPHIL NFR BLD AUTO: 0.7 %
EOSINOPHIL NFR BLD AUTO: 2.8 %
EOSINOPHIL NFR BLD AUTO: 3 %
EOSINOPHIL NFR BLD AUTO: 3.3 %
EOSINOPHIL NFR BLD AUTO: 4.2 %
EOSINOPHIL NFR BLD AUTO: 4.6 %
EOSINOPHIL NFR BLD AUTO: 5.5 %
ERYTHROCYTE [DISTWIDTH] IN BLOOD BY AUTOMATED COUNT: 14.1 % (ref 11–15)
ERYTHROCYTE [DISTWIDTH] IN BLOOD BY AUTOMATED COUNT: 14.1 % (ref 11–15)
ERYTHROCYTE [DISTWIDTH] IN BLOOD BY AUTOMATED COUNT: 14.6 % (ref 11–15)
ERYTHROCYTE [DISTWIDTH] IN BLOOD BY AUTOMATED COUNT: 14.6 % (ref 11–15)
ERYTHROCYTE [DISTWIDTH] IN BLOOD BY AUTOMATED COUNT: 14.7 % (ref 11–15)
ERYTHROCYTE [DISTWIDTH] IN BLOOD BY AUTOMATED COUNT: 15.2 % (ref 11–15)
ERYTHROCYTE [DISTWIDTH] IN BLOOD BY AUTOMATED COUNT: 15.9 % (ref 11–15)
ERYTHROCYTE [DISTWIDTH] IN BLOOD BY AUTOMATED COUNT: 18.1 % (ref 11–15)
ERYTHROCYTE [DISTWIDTH] IN BLOOD BY AUTOMATED COUNT: 18.5 % (ref 11–15)
ERYTHROCYTE [DISTWIDTH] IN BLOOD BY AUTOMATED COUNT: 19.3 % (ref 11–15)
ERYTHROCYTE [DISTWIDTH] IN BLOOD BY AUTOMATED COUNT: 19.4 % (ref 11–15)
ERYTHROCYTE [DISTWIDTH] IN BLOOD BY AUTOMATED COUNT: 20.5 % (ref 11–15)
EST. AVERAGE GLUCOSE BLD GHB EST-MCNC: 105 MG/DL (ref 68–126)
GLOBULIN PLAS-MCNC: 4.7 G/DL (ref 2.8–4.4)
GLOBULIN PLAS-MCNC: 4.9 G/DL (ref 2.8–4.4)
GLOBULIN PLAS-MCNC: 5 G/DL (ref 2.8–4.4)
GLOBULIN PLAS-MCNC: 5.1 G/DL (ref 2.8–4.4)
GLOBULIN PLAS-MCNC: 5.2 G/DL (ref 2.8–4.4)
GLOBULIN PLAS-MCNC: 5.3 G/DL (ref 2.8–4.4)
GLOBULIN PLAS-MCNC: 5.3 G/DL (ref 2.8–4.4)
GLOBULIN PLAS-MCNC: 5.5 G/DL (ref 2.8–4.4)
GLOBULIN PLAS-MCNC: 5.8 G/DL (ref 2.8–4.4)
GLOBULIN PLAS-MCNC: 5.8 G/DL (ref 2.8–4.4)
GLUCOSE BLD-MCNC: 105 MG/DL (ref 70–99)
GLUCOSE BLD-MCNC: 109 MG/DL (ref 70–99)
GLUCOSE BLD-MCNC: 116 MG/DL (ref 70–99)
GLUCOSE BLD-MCNC: 119 MG/DL (ref 70–99)
GLUCOSE BLD-MCNC: 119 MG/DL (ref 70–99)
GLUCOSE BLD-MCNC: 128 MG/DL (ref 70–99)
GLUCOSE BLD-MCNC: 131 MG/DL (ref 70–99)
GLUCOSE BLD-MCNC: 132 MG/DL (ref 70–99)
GLUCOSE BLD-MCNC: 135 MG/DL (ref 70–99)
GLUCOSE BLD-MCNC: 139 MG/DL (ref 70–99)
GLUCOSE BLD-MCNC: 142 MG/DL (ref 70–99)
GLUCOSE BLD-MCNC: 152 MG/DL (ref 70–99)
GLUCOSE BLD-MCNC: 157 MG/DL (ref 70–99)
GLUCOSE BLD-MCNC: 157 MG/DL (ref 70–99)
GLUCOSE BLD-MCNC: 170 MG/DL (ref 70–99)
GLUCOSE BLD-MCNC: 170 MG/DL (ref 70–99)
GLUCOSE UR STRIP.AUTO-MCNC: NEGATIVE MG/DL
HAV IGM SER QL: 1.6 MG/DL (ref 1.6–2.6)
HBA1C MFR BLD HPLC: 5.3 % (ref ?–5.7)
HCT VFR BLD AUTO: 26.8 %
HCT VFR BLD AUTO: 26.8 %
HCT VFR BLD AUTO: 27.6 %
HCT VFR BLD AUTO: 28.1 %
HCT VFR BLD AUTO: 28.3 %
HCT VFR BLD AUTO: 28.4 %
HCT VFR BLD AUTO: 28.4 %
HCT VFR BLD AUTO: 28.7 %
HCT VFR BLD AUTO: 28.7 %
HCT VFR BLD AUTO: 31 %
HCT VFR BLD AUTO: 31.4 %
HCT VFR BLD AUTO: 32.1 %
HGB BLD-MCNC: 10.2 G/DL
HGB BLD-MCNC: 10.6 G/DL
HGB BLD-MCNC: 10.7 G/DL
HGB BLD-MCNC: 9 G/DL
HGB BLD-MCNC: 9.1 G/DL
HGB BLD-MCNC: 9.3 G/DL
HGB BLD-MCNC: 9.4 G/DL
HGB BLD-MCNC: 9.4 G/DL
HGB BLD-MCNC: 9.6 G/DL
HGB BLD-MCNC: 9.6 G/DL
HGB BLD-MCNC: 9.7 G/DL
HGB BLD-MCNC: 9.7 G/DL
IMM GRANULOCYTES # BLD AUTO: 0.01 X10(3) UL (ref 0–1)
IMM GRANULOCYTES # BLD AUTO: 0.02 X10(3) UL (ref 0–1)
IMM GRANULOCYTES # BLD AUTO: 0.03 X10(3) UL (ref 0–1)
IMM GRANULOCYTES # BLD AUTO: 0.03 X10(3) UL (ref 0–1)
IMM GRANULOCYTES # BLD AUTO: 0.04 X10(3) UL (ref 0–1)
IMM GRANULOCYTES # BLD AUTO: 0.04 X10(3) UL (ref 0–1)
IMM GRANULOCYTES # BLD AUTO: 0.06 X10(3) UL (ref 0–1)
IMM GRANULOCYTES # BLD AUTO: 0.07 X10(3) UL (ref 0–1)
IMM GRANULOCYTES # BLD AUTO: 0.08 X10(3) UL (ref 0–1)
IMM GRANULOCYTES NFR BLD: 0.3 %
IMM GRANULOCYTES NFR BLD: 0.4 %
IMM GRANULOCYTES NFR BLD: 0.4 %
IMM GRANULOCYTES NFR BLD: 0.5 %
IMM GRANULOCYTES NFR BLD: 0.6 %
IMM GRANULOCYTES NFR BLD: 0.6 %
IMM GRANULOCYTES NFR BLD: 0.7 %
IMM GRANULOCYTES NFR BLD: 0.8 %
INR BLD: 1.37 (ref 0.88–1.11)
INR BLD: 1.5 (ref 0.89–1.11)
INR BLD: 1.57 (ref 0.89–1.11)
INR BLD: 1.66 (ref 0.89–1.11)
INR BLD: 1.8 (ref 0.89–1.11)
INR BLD: 1.96 (ref 0.89–1.11)
IRON SATURATION: 14 %
IRON SERPL-MCNC: 30 UG/DL
KETONES UR STRIP.AUTO-MCNC: NEGATIVE MG/DL
L PNEUMO AG UR QL: NEGATIVE
LDH SERPL L TO P-CCNC: 210 U/L
LDH SERPL L TO P-CCNC: 242 U/L
LDH SERPL L TO P-CCNC: 290 U/L
LEUKOCYTE ESTERASE UR QL STRIP.AUTO: NEGATIVE
LYMPHOCYTE BRONCHIAL WASHING: 3 %
LYMPHOCYTES # BLD AUTO: 0.38 X10(3) UL (ref 1–4)
LYMPHOCYTES # BLD AUTO: 0.39 X10(3) UL (ref 1–4)
LYMPHOCYTES # BLD AUTO: 0.4 X10(3) UL (ref 1–4)
LYMPHOCYTES # BLD AUTO: 0.43 X10(3) UL (ref 1–4)
LYMPHOCYTES # BLD AUTO: 0.47 X10(3) UL (ref 1–4)
LYMPHOCYTES # BLD AUTO: 0.47 X10(3) UL (ref 1–4)
LYMPHOCYTES # BLD AUTO: 0.51 X10(3) UL (ref 1–4)
LYMPHOCYTES # BLD AUTO: 0.54 X10(3) UL (ref 1–4)
LYMPHOCYTES # BLD AUTO: 0.56 X10(3) UL (ref 1–4)
LYMPHOCYTES # BLD AUTO: 0.61 X10(3) UL (ref 1–4)
LYMPHOCYTES # BLD AUTO: 0.63 X10(3) UL (ref 1–4)
LYMPHOCYTES NFR BLD AUTO: 12 %
LYMPHOCYTES NFR BLD AUTO: 13.1 %
LYMPHOCYTES NFR BLD AUTO: 15.1 %
LYMPHOCYTES NFR BLD AUTO: 15.4 %
LYMPHOCYTES NFR BLD AUTO: 5.4 %
LYMPHOCYTES NFR BLD AUTO: 5.8 %
LYMPHOCYTES NFR BLD AUTO: 5.8 %
LYMPHOCYTES NFR BLD AUTO: 6.2 %
LYMPHOCYTES NFR BLD AUTO: 6.7 %
LYMPHOCYTES NFR BLD AUTO: 7.9 %
LYMPHOCYTES NFR BLD AUTO: 8.7 %
M PROTEIN MFR SERPL ELPH: 6.9 G/DL (ref 6.4–8.2)
M PROTEIN MFR SERPL ELPH: 7 G/DL (ref 6.4–8.2)
M PROTEIN MFR SERPL ELPH: 7 G/DL (ref 6.4–8.2)
M PROTEIN MFR SERPL ELPH: 7.1 G/DL (ref 6.4–8.2)
M PROTEIN MFR SERPL ELPH: 7.2 G/DL (ref 6.4–8.2)
M PROTEIN MFR SERPL ELPH: 7.3 G/DL (ref 6.4–8.2)
M PROTEIN MFR SERPL ELPH: 7.3 G/DL (ref 6.4–8.2)
M PROTEIN MFR SERPL ELPH: 7.4 G/DL (ref 6.4–8.2)
M PROTEIN MFR SERPL ELPH: 7.5 G/DL (ref 6.4–8.2)
M PROTEIN MFR SERPL ELPH: 7.6 G/DL (ref 6.4–8.2)
M TB CMPLX RRNA SPEC QL PROBE: NOT DETECTED
MCH RBC QN AUTO: 31 PG (ref 26–34)
MCH RBC QN AUTO: 31.1 PG (ref 26–34)
MCH RBC QN AUTO: 31.3 PG (ref 26–34)
MCH RBC QN AUTO: 31.5 PG (ref 26–34)
MCH RBC QN AUTO: 31.5 PG (ref 26–34)
MCH RBC QN AUTO: 31.8 PG (ref 26–34)
MCH RBC QN AUTO: 31.8 PG (ref 26–34)
MCH RBC QN AUTO: 31.9 PG (ref 26–34)
MCH RBC QN AUTO: 32 PG (ref 26–34)
MCH RBC QN AUTO: 32.1 PG (ref 26–34)
MCH RBC QN AUTO: 32.1 PG (ref 26–34)
MCH RBC QN AUTO: 32.4 PG (ref 26–34)
MCHC RBC AUTO-ENTMCNC: 32.8 G/DL (ref 31–37)
MCHC RBC AUTO-ENTMCNC: 32.9 G/DL (ref 31–37)
MCHC RBC AUTO-ENTMCNC: 33.1 G/DL (ref 31–37)
MCHC RBC AUTO-ENTMCNC: 33.3 G/DL (ref 31–37)
MCHC RBC AUTO-ENTMCNC: 33.4 G/DL (ref 31–37)
MCHC RBC AUTO-ENTMCNC: 33.6 G/DL (ref 31–37)
MCHC RBC AUTO-ENTMCNC: 33.7 G/DL (ref 31–37)
MCHC RBC AUTO-ENTMCNC: 33.8 G/DL (ref 31–37)
MCHC RBC AUTO-ENTMCNC: 33.8 G/DL (ref 31–37)
MCHC RBC AUTO-ENTMCNC: 34 G/DL (ref 31–37)
MCHC RBC AUTO-ENTMCNC: 34.3 G/DL (ref 31–37)
MCHC RBC AUTO-ENTMCNC: 34.5 G/DL (ref 31–37)
MCV RBC AUTO: 92.1 FL
MCV RBC AUTO: 92.3 FL
MCV RBC AUTO: 92.4 FL
MCV RBC AUTO: 92.7 FL
MCV RBC AUTO: 92.8 FL
MCV RBC AUTO: 93.1 FL
MCV RBC AUTO: 95.7 FL
MCV RBC AUTO: 95.7 FL
MCV RBC AUTO: 96 FL
MCV RBC AUTO: 96.3 FL
MCV RBC AUTO: 96.4 FL
MCV RBC AUTO: 97.5 FL
MON/MACROPHAGE BRONCHIAL WASH: 69 %
MONOCYTES # BLD AUTO: 0.3 X10(3) UL (ref 0.1–1)
MONOCYTES # BLD AUTO: 0.33 X10(3) UL (ref 0.1–1)
MONOCYTES # BLD AUTO: 0.35 X10(3) UL (ref 0.1–1)
MONOCYTES # BLD AUTO: 0.4 X10(3) UL (ref 0.1–1)
MONOCYTES # BLD AUTO: 0.45 X10(3) UL (ref 0.1–1)
MONOCYTES # BLD AUTO: 0.63 X10(3) UL (ref 0.1–1)
MONOCYTES # BLD AUTO: 0.7 X10(3) UL (ref 0.1–1)
MONOCYTES # BLD AUTO: 0.73 X10(3) UL (ref 0.1–1)
MONOCYTES # BLD AUTO: 0.91 X10(3) UL (ref 0.1–1)
MONOCYTES # BLD AUTO: 0.99 X10(3) UL (ref 0.1–1)
MONOCYTES # BLD AUTO: 1.02 X10(3) UL (ref 0.1–1)
MONOCYTES NFR BLD AUTO: 10 %
MONOCYTES NFR BLD AUTO: 10 %
MONOCYTES NFR BLD AUTO: 10.6 %
MONOCYTES NFR BLD AUTO: 11.2 %
MONOCYTES NFR BLD AUTO: 12.5 %
MONOCYTES NFR BLD AUTO: 17.9 %
MONOCYTES NFR BLD AUTO: 6.9 %
MONOCYTES NFR BLD AUTO: 7.1 %
MONOCYTES NFR BLD AUTO: 9.2 %
MONOCYTES NFR BLD AUTO: 9.3 %
MONOCYTES NFR BLD AUTO: 9.4 %
NEUTROPHILS # BLD AUTO: 2.1 X10 (3) UL (ref 1.5–7.7)
NEUTROPHILS # BLD AUTO: 2.1 X10(3) UL (ref 1.5–7.7)
NEUTROPHILS # BLD AUTO: 2.43 X10 (3) UL (ref 1.5–7.7)
NEUTROPHILS # BLD AUTO: 2.43 X10(3) UL (ref 1.5–7.7)
NEUTROPHILS # BLD AUTO: 2.51 X10 (3) UL (ref 1.5–7.7)
NEUTROPHILS # BLD AUTO: 2.51 X10(3) UL (ref 1.5–7.7)
NEUTROPHILS # BLD AUTO: 2.58 X10 (3) UL (ref 1.5–7.7)
NEUTROPHILS # BLD AUTO: 2.58 X10(3) UL (ref 1.5–7.7)
NEUTROPHILS # BLD AUTO: 3.5 X10 (3) UL (ref 1.5–7.7)
NEUTROPHILS # BLD AUTO: 3.5 X10(3) UL (ref 1.5–7.7)
NEUTROPHILS # BLD AUTO: 3.55 X10 (3) UL (ref 1.5–7.7)
NEUTROPHILS # BLD AUTO: 3.55 X10(3) UL (ref 1.5–7.7)
NEUTROPHILS # BLD AUTO: 5.67 X10 (3) UL (ref 1.5–7.7)
NEUTROPHILS # BLD AUTO: 5.67 X10(3) UL (ref 1.5–7.7)
NEUTROPHILS # BLD AUTO: 6.29 X10 (3) UL (ref 1.5–7.7)
NEUTROPHILS # BLD AUTO: 6.29 X10(3) UL (ref 1.5–7.7)
NEUTROPHILS # BLD AUTO: 8.5 X10 (3) UL (ref 1.5–7.7)
NEUTROPHILS # BLD AUTO: 8.5 X10(3) UL (ref 1.5–7.7)
NEUTROPHILS # BLD AUTO: 8.81 X10 (3) UL (ref 1.5–7.7)
NEUTROPHILS # BLD AUTO: 8.81 X10(3) UL (ref 1.5–7.7)
NEUTROPHILS # BLD AUTO: 8.88 X10 (3) UL (ref 1.5–7.7)
NEUTROPHILS # BLD AUTO: 8.88 X10(3) UL (ref 1.5–7.7)
NEUTROPHILS BRONCHIAL WASHING: 28 %
NEUTROPHILS NFR BLD AUTO: 67.6 %
NEUTROPHILS NFR BLD AUTO: 69.4 %
NEUTROPHILS NFR BLD AUTO: 69.9 %
NEUTROPHILS NFR BLD AUTO: 70.1 %
NEUTROPHILS NFR BLD AUTO: 71.8 %
NEUTROPHILS NFR BLD AUTO: 80.4 %
NEUTROPHILS NFR BLD AUTO: 82.5 %
NEUTROPHILS NFR BLD AUTO: 83 %
NEUTROPHILS NFR BLD AUTO: 83.3 %
NEUTROPHILS NFR BLD AUTO: 83.7 %
NEUTROPHILS NFR BLD AUTO: 86.2 %
NITRITE UR QL STRIP.AUTO: NEGATIVE
NT-PROBNP SERPL-MCNC: 372 PG/ML (ref ?–125)
NT-PROBNP SERPL-MCNC: 434 PG/ML (ref ?–125)
OSMOLALITY SERPL CALC.SUM OF ELEC: 266 MOSM/KG (ref 275–295)
OSMOLALITY SERPL CALC.SUM OF ELEC: 267 MOSM/KG (ref 275–295)
OSMOLALITY SERPL CALC.SUM OF ELEC: 268 MOSM/KG (ref 275–295)
OSMOLALITY SERPL CALC.SUM OF ELEC: 269 MOSM/KG (ref 275–295)
OSMOLALITY SERPL CALC.SUM OF ELEC: 270 MOSM/KG (ref 275–295)
OSMOLALITY SERPL CALC.SUM OF ELEC: 272 MOSM/KG (ref 275–295)
OSMOLALITY SERPL CALC.SUM OF ELEC: 274 MOSM/KG (ref 275–295)
OSMOLALITY SERPL CALC.SUM OF ELEC: 274 MOSM/KG (ref 275–295)
OSMOLALITY SERPL CALC.SUM OF ELEC: 275 MOSM/KG (ref 275–295)
OSMOLALITY SERPL CALC.SUM OF ELEC: 276 MOSM/KG (ref 275–295)
OSMOLALITY SERPL CALC.SUM OF ELEC: 278 MOSM/KG (ref 275–295)
OSMOLALITY SERPL CALC.SUM OF ELEC: 280 MOSM/KG (ref 275–295)
OSMOLALITY SERPL CALC.SUM OF ELEC: 281 MOSM/KG (ref 275–295)
OSMOLALITY SERPL CALC.SUM OF ELEC: 282 MOSM/KG (ref 275–295)
OSMOLALITY SERPL CALC.SUM OF ELEC: 286 MOSM/KG (ref 275–295)
P AXIS: 11 DEGREES
P AXIS: 15 DEGREES
P AXIS: 29 DEGREES
P-R INTERVAL: 156 MS
P-R INTERVAL: 188 MS
P-R INTERVAL: 188 MS
P. JIROVECII DETECTION BY PCR: NOT DETECTED
PATIENT FASTING Y/N/NP: NO
PH UR STRIP.AUTO: 5.5 [PH] (ref 4.5–8)
PLATELET # BLD AUTO: 100 10(3)UL (ref 150–450)
PLATELET # BLD AUTO: 107 10(3)UL (ref 150–450)
PLATELET # BLD AUTO: 111 10(3)UL (ref 150–450)
PLATELET # BLD AUTO: 112 10(3)UL (ref 150–450)
PLATELET # BLD AUTO: 70 10(3)UL (ref 150–450)
PLATELET # BLD AUTO: 73 10(3)UL (ref 150–450)
PLATELET # BLD AUTO: 77 10(3)UL (ref 150–450)
PLATELET # BLD AUTO: 83 10(3)UL (ref 150–450)
PLATELET # BLD AUTO: 85 10(3)UL (ref 150–450)
PLATELET # BLD AUTO: 87 10(3)UL (ref 150–450)
PLATELET # BLD AUTO: 88 10(3)UL (ref 150–450)
PLATELET # BLD AUTO: 91 10(3)UL (ref 150–450)
PLATELET MORPHOLOGY: NORMAL
POTASSIUM SERPL-SCNC: 3.2 MMOL/L (ref 3.5–5.1)
POTASSIUM SERPL-SCNC: 3.4 MMOL/L (ref 3.5–5.1)
POTASSIUM SERPL-SCNC: 3.6 MMOL/L (ref 3.5–5.1)
POTASSIUM SERPL-SCNC: 3.8 MMOL/L (ref 3.5–5.1)
POTASSIUM SERPL-SCNC: 3.9 MMOL/L (ref 3.5–5.1)
POTASSIUM SERPL-SCNC: 3.9 MMOL/L (ref 3.5–5.1)
POTASSIUM SERPL-SCNC: 4.1 MMOL/L (ref 3.5–5.1)
POTASSIUM SERPL-SCNC: 4.1 MMOL/L (ref 3.5–5.1)
PROCALCITONIN SERPL-MCNC: 0.06 NG/ML (ref ?–0.16)
PROCALCITONIN SERPL-MCNC: 0.67 NG/ML (ref ?–0.16)
PROCALCITONIN SERPL-MCNC: <0.05 NG/ML (ref ?–0.16)
PROT UR STRIP.AUTO-MCNC: NEGATIVE MG/DL
PSA SERPL DL<=0.01 NG/ML-MCNC: 16.7 SECONDS (ref 12–14.3)
PSA SERPL DL<=0.01 NG/ML-MCNC: 18.5 SECONDS (ref 12.4–14.6)
PSA SERPL DL<=0.01 NG/ML-MCNC: 19.2 SECONDS (ref 12.4–14.6)
PSA SERPL DL<=0.01 NG/ML-MCNC: 20.1 SECONDS (ref 12.4–14.6)
PSA SERPL DL<=0.01 NG/ML-MCNC: 21.4 SECONDS (ref 12.4–14.6)
PSA SERPL DL<=0.01 NG/ML-MCNC: 22.8 SECONDS (ref 12.4–14.6)
Q-T INTERVAL: 368 MS
Q-T INTERVAL: 412 MS
Q-T INTERVAL: 426 MS
QRS DURATION: 102 MS
QRS DURATION: 94 MS
QRS DURATION: 98 MS
QTC CALCULATION (BEZET): 462 MS
QTC CALCULATION (BEZET): 469 MS
QTC CALCULATION (BEZET): 483 MS
R AXIS: 31 DEGREES
R AXIS: 34 DEGREES
R AXIS: 7 DEGREES
RBC # BLD AUTO: 2.89 X10(6)UL
RBC # BLD AUTO: 2.9 X10(6)UL
RBC # BLD AUTO: 2.95 X10(6)UL
RBC # BLD AUTO: 2.99 X10(6)UL
RBC # BLD AUTO: 3 X10(6)UL
RBC # BLD AUTO: 3 X10(6)UL
RBC # BLD AUTO: 3.05 X10(6)UL
RBC # BLD AUTO: 3.18 X10(6)UL
RBC # BLD AUTO: 3.27 X10(6)UL
RBC # BLD AUTO: 3.33 X10(6)UL
RBC # FLD: 27 /MM3
RBC BRONCH FOR MAN CT: 5200 /MM3
RBC UR QL AUTO: NEGATIVE
SARS-COV-2 RNA RESP QL NAA+PROBE: NOT DETECTED
SED RATE-ML: 103 MM/HR
SED RATE-ML: 87 MM/HR
SODIUM SERPL-SCNC: 126 MMOL/L (ref 136–145)
SODIUM SERPL-SCNC: 127 MMOL/L
SODIUM SERPL-SCNC: 128 MMOL/L (ref 136–145)
SODIUM SERPL-SCNC: 129 MMOL/L (ref 136–145)
SODIUM SERPL-SCNC: 130 MMOL/L (ref 136–145)
SODIUM SERPL-SCNC: 130 MMOL/L (ref 136–145)
SODIUM SERPL-SCNC: 132 MMOL/L (ref 136–145)
SODIUM SERPL-SCNC: 133 MMOL/L (ref 136–145)
SODIUM SERPL-SCNC: 135 MMOL/L (ref 136–145)
SODIUM SERPL-SCNC: 137 MMOL/L (ref 136–145)
SP GR UR STRIP.AUTO: 1.01 (ref 1–1.03)
STREP PNEUMO ANTIGEN, URINE: NEGATIVE
T AXIS: 30 DEGREES
T AXIS: 32 DEGREES
T AXIS: 39 DEGREES
TOTAL CELLS COUNTED: 100
TOTAL IRON BINDING CAPACITY: 222 UG/DL (ref 240–450)
TRANSFERRIN SERPL-MCNC: 149 MG/DL (ref 200–360)
TROPONIN I SERPL-MCNC: <0.045 NG/ML (ref ?–0.04)
TROPONIN I SERPL-MCNC: <0.045 NG/ML (ref ?–0.04)
URATE SERPL-MCNC: 2.8 MG/DL
UROBILINOGEN UR STRIP.AUTO-MCNC: 0.2 MG/DL
UUN UR-MCNC: 82 MG/DL
VENTRICULAR RATE: 104 BPM
VENTRICULAR RATE: 71 BPM
VENTRICULAR RATE: 78 BPM
WBC # BLD AUTO: 10.2 X10(3) UL (ref 4–11)
WBC # BLD AUTO: 10.3 X10(3) UL (ref 4–11)
WBC # BLD AUTO: 10.4 X10(3) UL (ref 4–11)
WBC # BLD AUTO: 10.6 X10(3) UL (ref 4–11)
WBC # BLD AUTO: 3.1 X10(3) UL (ref 4–11)
WBC # BLD AUTO: 3.5 X10(3) UL (ref 4–11)
WBC # BLD AUTO: 3.6 X10(3) UL (ref 4–11)
WBC # BLD AUTO: 3.6 X10(3) UL (ref 4–11)
WBC # BLD AUTO: 4.4 X10(3) UL (ref 4–11)
WBC # BLD AUTO: 5.1 X10(3) UL (ref 4–11)
WBC # BLD AUTO: 6.8 X10(3) UL (ref 4–11)
WBC # BLD AUTO: 7.6 X10(3) UL (ref 4–11)

## 2021-01-01 PROCEDURE — 71275 CT ANGIOGRAPHY CHEST: CPT | Performed by: HOSPITALIST

## 2021-01-01 PROCEDURE — 99215 OFFICE O/P EST HI 40 MIN: CPT | Performed by: INTERNAL MEDICINE

## 2021-01-01 PROCEDURE — 74177 CT ABD & PELVIS W/CONTRAST: CPT | Performed by: INTERNAL MEDICINE

## 2021-01-01 PROCEDURE — BF101ZZ FLUOROSCOPY OF BILE DUCTS USING LOW OSMOLAR CONTRAST: ICD-10-PCS | Performed by: INTERNAL MEDICINE

## 2021-01-01 PROCEDURE — 99232 SBSQ HOSP IP/OBS MODERATE 35: CPT | Performed by: INTERNAL MEDICINE

## 2021-01-01 PROCEDURE — 99239 HOSP IP/OBS DSCHRG MGMT >30: CPT | Performed by: HOSPITALIST

## 2021-01-01 PROCEDURE — 36415 COLL VENOUS BLD VENIPUNCTURE: CPT

## 2021-01-01 PROCEDURE — 0BBF8ZX EXCISION OF RIGHT LOWER LUNG LOBE, VIA NATURAL OR ARTIFICIAL OPENING ENDOSCOPIC, DIAGNOSTIC: ICD-10-PCS | Performed by: INTERNAL MEDICINE

## 2021-01-01 PROCEDURE — 99232 SBSQ HOSP IP/OBS MODERATE 35: CPT | Performed by: HOSPITALIST

## 2021-01-01 PROCEDURE — 93005 ELECTROCARDIOGRAM TRACING: CPT

## 2021-01-01 PROCEDURE — 71045 X-RAY EXAM CHEST 1 VIEW: CPT | Performed by: INTERNAL MEDICINE

## 2021-01-01 PROCEDURE — 02HV33Z INSERTION OF INFUSION DEVICE INTO SUPERIOR VENA CAVA, PERCUTANEOUS APPROACH: ICD-10-PCS | Performed by: RADIOLOGY

## 2021-01-01 PROCEDURE — 76376 3D RENDER W/INTRP POSTPROCES: CPT | Performed by: INTERNAL MEDICINE

## 2021-01-01 PROCEDURE — 0JH60WZ INSERTION OF TOTALLY IMPLANTABLE VASCULAR ACCESS DEVICE INTO CHEST SUBCUTANEOUS TISSUE AND FASCIA, OPEN APPROACH: ICD-10-PCS | Performed by: RADIOLOGY

## 2021-01-01 PROCEDURE — 71045 X-RAY EXAM CHEST 1 VIEW: CPT | Performed by: EMERGENCY MEDICINE

## 2021-01-01 PROCEDURE — 78815 PET IMAGE W/CT SKULL-THIGH: CPT | Performed by: INTERNAL MEDICINE

## 2021-01-01 PROCEDURE — 74328 X-RAY BILE DUCT ENDOSCOPY: CPT | Performed by: INTERNAL MEDICINE

## 2021-01-01 PROCEDURE — 84484 ASSAY OF TROPONIN QUANT: CPT | Performed by: EMERGENCY MEDICINE

## 2021-01-01 PROCEDURE — 99255 IP/OBS CONSLTJ NEW/EST HI 80: CPT | Performed by: INTERNAL MEDICINE

## 2021-01-01 PROCEDURE — 83615 LACTATE (LD) (LDH) ENZYME: CPT

## 2021-01-01 PROCEDURE — 85652 RBC SED RATE AUTOMATED: CPT

## 2021-01-01 PROCEDURE — 99284 EMERGENCY DEPT VISIT MOD MDM: CPT | Performed by: EMERGENCY MEDICINE

## 2021-01-01 PROCEDURE — 99233 SBSQ HOSP IP/OBS HIGH 50: CPT | Performed by: HOSPITALIST

## 2021-01-01 PROCEDURE — 99223 1ST HOSP IP/OBS HIGH 75: CPT | Performed by: HOSPITALIST

## 2021-01-01 PROCEDURE — 0F778DZ DILATION OF COMMON HEPATIC DUCT WITH INTRALUMINAL DEVICE, VIA NATURAL OR ARTIFICIAL OPENING ENDOSCOPIC: ICD-10-PCS | Performed by: INTERNAL MEDICINE

## 2021-01-01 PROCEDURE — 83615 LACTATE (LD) (LDH) ENZYME: CPT | Performed by: EMERGENCY MEDICINE

## 2021-01-01 PROCEDURE — 85025 COMPLETE CBC W/AUTO DIFF WBC: CPT

## 2021-01-01 PROCEDURE — 85379 FIBRIN DEGRADATION QUANT: CPT | Performed by: EMERGENCY MEDICINE

## 2021-01-01 PROCEDURE — 96413 CHEMO IV INFUSION 1 HR: CPT

## 2021-01-01 PROCEDURE — 93306 TTE W/DOPPLER COMPLETE: CPT | Performed by: HOSPITALIST

## 2021-01-01 PROCEDURE — 93010 ELECTROCARDIOGRAM REPORT: CPT | Performed by: EMERGENCY MEDICINE

## 2021-01-01 PROCEDURE — 80053 COMPREHEN METABOLIC PANEL: CPT | Performed by: EMERGENCY MEDICINE

## 2021-01-01 PROCEDURE — 87040 BLOOD CULTURE FOR BACTERIA: CPT | Performed by: EMERGENCY MEDICINE

## 2021-01-01 PROCEDURE — 83880 ASSAY OF NATRIURETIC PEPTIDE: CPT | Performed by: EMERGENCY MEDICINE

## 2021-01-01 PROCEDURE — 80053 COMPREHEN METABOLIC PANEL: CPT

## 2021-01-01 PROCEDURE — 74183 MRI ABD W/O CNTR FLWD CNTR: CPT | Performed by: INTERNAL MEDICINE

## 2021-01-01 PROCEDURE — 82962 GLUCOSE BLOOD TEST: CPT

## 2021-01-01 PROCEDURE — 36415 COLL VENOUS BLD VENIPUNCTURE: CPT | Performed by: EMERGENCY MEDICINE

## 2021-01-01 PROCEDURE — 99233 SBSQ HOSP IP/OBS HIGH 50: CPT | Performed by: INTERNAL MEDICINE

## 2021-01-01 PROCEDURE — 82728 ASSAY OF FERRITIN: CPT | Performed by: EMERGENCY MEDICINE

## 2021-01-01 PROCEDURE — 0B9C8ZX DRAINAGE OF RIGHT UPPER LUNG LOBE, VIA NATURAL OR ARTIFICIAL OPENING ENDOSCOPIC, DIAGNOSTIC: ICD-10-PCS | Performed by: INTERNAL MEDICINE

## 2021-01-01 PROCEDURE — 96415 CHEMO IV INFUSION ADDL HR: CPT

## 2021-01-01 PROCEDURE — 71101 X-RAY EXAM UNILAT RIBS/CHEST: CPT | Performed by: EMERGENCY MEDICINE

## 2021-01-01 PROCEDURE — B5181ZA FLUOROSCOPY OF SUPERIOR VENA CAVA USING LOW OSMOLAR CONTRAST, GUIDANCE: ICD-10-PCS | Performed by: RADIOLOGY

## 2021-01-01 PROCEDURE — 86140 C-REACTIVE PROTEIN: CPT

## 2021-01-01 PROCEDURE — 71275 CT ANGIOGRAPHY CHEST: CPT | Performed by: EMERGENCY MEDICINE

## 2021-01-01 PROCEDURE — 74178 CT ABD&PLV WO CNTR FLWD CNTR: CPT | Performed by: INTERNAL MEDICINE

## 2021-01-01 PROCEDURE — 93970 EXTREMITY STUDY: CPT | Performed by: HOSPITALIST

## 2021-01-01 PROCEDURE — 86140 C-REACTIVE PROTEIN: CPT | Performed by: EMERGENCY MEDICINE

## 2021-01-01 PROCEDURE — 70553 MRI BRAIN STEM W/O & W/DYE: CPT | Performed by: INTERNAL MEDICINE

## 2021-01-01 PROCEDURE — 84145 PROCALCITONIN (PCT): CPT | Performed by: EMERGENCY MEDICINE

## 2021-01-01 PROCEDURE — 71260 CT THORAX DX C+: CPT | Performed by: INTERNAL MEDICINE

## 2021-01-01 PROCEDURE — 85025 COMPLETE CBC W/AUTO DIFF WBC: CPT | Performed by: EMERGENCY MEDICINE

## 2021-01-01 PROCEDURE — 07D78ZX EXTRACTION OF THORAX LYMPHATIC, VIA NATURAL OR ARTIFICIAL OPENING ENDOSCOPIC, DIAGNOSTIC: ICD-10-PCS | Performed by: INTERNAL MEDICINE

## 2021-01-01 DEVICE — STENT SYSTEM
Type: IMPLANTABLE DEVICE | Site: BILIARY | Status: FUNCTIONAL
Brand: WALLFLEX™ BILIARY

## 2021-01-01 RX ORDER — MORPHINE SULFATE 15 MG/1
15 TABLET, FILM COATED, EXTENDED RELEASE ORAL EVERY 12 HOURS SCHEDULED
Status: DISCONTINUED | OUTPATIENT
Start: 2021-01-01 | End: 2021-01-01

## 2021-01-01 RX ORDER — SODIUM CHLORIDE, SODIUM LACTATE, POTASSIUM CHLORIDE, CALCIUM CHLORIDE 600; 310; 30; 20 MG/100ML; MG/100ML; MG/100ML; MG/100ML
INJECTION, SOLUTION INTRAVENOUS CONTINUOUS PRN
Status: DISCONTINUED | OUTPATIENT
Start: 2021-01-01 | End: 2021-01-01 | Stop reason: SURG

## 2021-01-01 RX ORDER — FUROSEMIDE 40 MG/1
40 TABLET ORAL 2 TIMES DAILY
Status: DISCONTINUED | OUTPATIENT
Start: 2021-01-01 | End: 2021-01-01

## 2021-01-01 RX ORDER — CARVEDILOL 12.5 MG/1
12.5 TABLET ORAL 2 TIMES DAILY WITH MEALS
Status: DISCONTINUED | OUTPATIENT
Start: 2021-01-01 | End: 2021-01-01

## 2021-01-01 RX ORDER — LABETALOL HYDROCHLORIDE 5 MG/ML
5 INJECTION, SOLUTION INTRAVENOUS EVERY 5 MIN PRN
Status: DISCONTINUED | OUTPATIENT
Start: 2021-01-01 | End: 2021-01-01 | Stop reason: HOSPADM

## 2021-01-01 RX ORDER — PANTOPRAZOLE SODIUM 40 MG/1
40 TABLET, DELAYED RELEASE ORAL
Status: DISCONTINUED | OUTPATIENT
Start: 2021-01-01 | End: 2021-01-01

## 2021-01-01 RX ORDER — HEPARIN SODIUM 5000 [USP'U]/ML
INJECTION, SOLUTION INTRAVENOUS; SUBCUTANEOUS
Status: COMPLETED
Start: 2021-01-01 | End: 2021-01-01

## 2021-01-01 RX ORDER — LIDOCAINE 4 G/G
1 PATCH TOPICAL EVERY 24 HOURS
Qty: 30 PATCH | Refills: 2 | Status: SHIPPED | OUTPATIENT
Start: 2021-01-01

## 2021-01-01 RX ORDER — NALOXONE HYDROCHLORIDE 0.4 MG/ML
80 INJECTION, SOLUTION INTRAMUSCULAR; INTRAVENOUS; SUBCUTANEOUS AS NEEDED
Status: DISCONTINUED | OUTPATIENT
Start: 2021-01-01 | End: 2021-01-01 | Stop reason: HOSPADM

## 2021-01-01 RX ORDER — MULTIPLE VITAMINS W/ MINERALS TAB 9MG-400MCG
1 TAB ORAL DAILY
Status: DISCONTINUED | OUTPATIENT
Start: 2021-01-01 | End: 2021-01-01

## 2021-01-01 RX ORDER — CEFPODOXIME PROXETIL 200 MG/1
200 TABLET, FILM COATED ORAL 2 TIMES DAILY
Qty: 14 TABLET | Refills: 0 | Status: SHIPPED | OUTPATIENT
Start: 2021-01-01 | End: 2021-01-01 | Stop reason: ALTCHOICE

## 2021-01-01 RX ORDER — ENOXAPARIN SODIUM 100 MG/ML
40 INJECTION SUBCUTANEOUS DAILY
Status: DISCONTINUED | OUTPATIENT
Start: 2021-01-01 | End: 2021-01-01

## 2021-01-01 RX ORDER — ACETAMINOPHEN 325 MG/1
650 TABLET ORAL EVERY 6 HOURS PRN
Status: DISCONTINUED | OUTPATIENT
Start: 2021-01-01 | End: 2021-01-01

## 2021-01-01 RX ORDER — DICYCLOMINE HYDROCHLORIDE 10 MG/1
10 CAPSULE ORAL
Status: DISCONTINUED | OUTPATIENT
Start: 2021-01-01 | End: 2021-01-01

## 2021-01-01 RX ORDER — VANCOMYCIN HYDROCHLORIDE 125 MG/1
125 CAPSULE ORAL 4 TIMES DAILY
Qty: 40 CAPSULE | Refills: 0 | Status: SHIPPED | OUTPATIENT
Start: 2021-01-01 | End: 2021-01-01 | Stop reason: ALTCHOICE

## 2021-01-01 RX ORDER — HYDROCODONE BITARTRATE AND ACETAMINOPHEN 5; 325 MG/1; MG/1
1 TABLET ORAL EVERY 6 HOURS PRN
Qty: 120 TABLET | Refills: 0 | Status: SHIPPED | OUTPATIENT
Start: 2021-01-01

## 2021-01-01 RX ORDER — FUROSEMIDE 10 MG/ML
40 INJECTION INTRAMUSCULAR; INTRAVENOUS
Status: DISCONTINUED | OUTPATIENT
Start: 2021-01-01 | End: 2021-01-01

## 2021-01-01 RX ORDER — METOCLOPRAMIDE HYDROCHLORIDE 5 MG/ML
10 INJECTION INTRAMUSCULAR; INTRAVENOUS AS NEEDED
Status: DISCONTINUED | OUTPATIENT
Start: 2021-01-01 | End: 2021-01-01 | Stop reason: HOSPADM

## 2021-01-01 RX ORDER — MORPHINE SULFATE 15 MG/1
TABLET ORAL EVERY 4 HOURS PRN
Qty: 60 TABLET | Refills: 0 | Status: SHIPPED | OUTPATIENT
Start: 2021-01-01

## 2021-01-01 RX ORDER — MORPHINE SULFATE 15 MG/1
TABLET ORAL EVERY 4 HOURS PRN
Status: DISCONTINUED | OUTPATIENT
Start: 2021-01-01 | End: 2021-01-01

## 2021-01-01 RX ORDER — HYDROMORPHONE HYDROCHLORIDE 1 MG/ML
0.4 INJECTION, SOLUTION INTRAMUSCULAR; INTRAVENOUS; SUBCUTANEOUS EVERY 5 MIN PRN
Status: DISCONTINUED | OUTPATIENT
Start: 2021-01-01 | End: 2021-01-01 | Stop reason: HOSPADM

## 2021-01-01 RX ORDER — LEVOFLOXACIN 500 MG/1
500 TABLET, FILM COATED ORAL DAILY
Qty: 5 TABLET | Refills: 0 | Status: SHIPPED | OUTPATIENT
Start: 2021-01-01 | End: 2021-01-01 | Stop reason: ALTCHOICE

## 2021-01-01 RX ORDER — MORPHINE SULFATE 15 MG/1
15 TABLET, FILM COATED, EXTENDED RELEASE ORAL EVERY 12 HOURS SCHEDULED
Qty: 60 TABLET | Refills: 0 | Status: SHIPPED | OUTPATIENT
Start: 2021-01-01 | End: 2021-04-01

## 2021-01-01 RX ORDER — ONDANSETRON 2 MG/ML
INJECTION INTRAMUSCULAR; INTRAVENOUS AS NEEDED
Status: DISCONTINUED | OUTPATIENT
Start: 2021-01-01 | End: 2021-01-01 | Stop reason: SURG

## 2021-01-01 RX ORDER — VANCOMYCIN HYDROCHLORIDE 125 MG/1
125 CAPSULE ORAL DAILY
Status: DISCONTINUED | OUTPATIENT
Start: 2021-01-01 | End: 2021-01-01

## 2021-01-01 RX ORDER — CLINDAMYCIN PHOSPHATE 150 MG/ML
INJECTION, SOLUTION INTRAVENOUS
Status: COMPLETED
Start: 2021-01-01 | End: 2021-01-01

## 2021-01-01 RX ORDER — SPIRONOLACTONE 25 MG/1
25 TABLET ORAL EVERY OTHER DAY
Status: DISCONTINUED | OUTPATIENT
Start: 2021-01-01 | End: 2021-01-01

## 2021-01-01 RX ORDER — HYDROCODONE BITARTRATE AND ACETAMINOPHEN 5; 325 MG/1; MG/1
1 TABLET ORAL EVERY 6 HOURS PRN
Status: DISCONTINUED | OUTPATIENT
Start: 2021-01-01 | End: 2021-01-01

## 2021-01-01 RX ORDER — ZOLPIDEM TARTRATE 5 MG/1
5 TABLET ORAL NIGHTLY PRN
Status: DISCONTINUED | OUTPATIENT
Start: 2021-01-01 | End: 2021-01-01

## 2021-01-01 RX ORDER — VANCOMYCIN HYDROCHLORIDE 125 MG/1
125 CAPSULE ORAL DAILY
Qty: 7 CAPSULE | Refills: 0 | Status: SHIPPED | OUTPATIENT
Start: 2021-01-01 | End: 2021-01-01 | Stop reason: ALTCHOICE

## 2021-01-01 RX ORDER — ONDANSETRON 2 MG/ML
4 INJECTION INTRAMUSCULAR; INTRAVENOUS EVERY 6 HOURS PRN
Status: DISCONTINUED | OUTPATIENT
Start: 2021-01-01 | End: 2021-01-01

## 2021-01-01 RX ORDER — POTASSIUM CHLORIDE 20 MEQ/1
20 TABLET, EXTENDED RELEASE ORAL DAILY
Qty: 60 TABLET | Refills: 2 | Status: SHIPPED | OUTPATIENT
Start: 2021-01-01

## 2021-01-01 RX ORDER — LIDOCAINE 4 G/G
1 PATCH TOPICAL EVERY 24 HOURS
Qty: 10 PATCH | Refills: 0 | Status: SHIPPED | OUTPATIENT
Start: 2021-01-01 | End: 2021-01-01

## 2021-01-01 RX ORDER — LIDOCAINE HYDROCHLORIDE 10 MG/ML
INJECTION, SOLUTION EPIDURAL; INFILTRATION; INTRACAUDAL; PERINEURAL AS NEEDED
Status: DISCONTINUED | OUTPATIENT
Start: 2021-01-01 | End: 2021-01-01 | Stop reason: SURG

## 2021-01-01 RX ORDER — DIAZEPAM 2 MG/1
2 TABLET ORAL EVERY 8 HOURS PRN
Status: DISCONTINUED | OUTPATIENT
Start: 2021-01-01 | End: 2021-01-01

## 2021-01-01 RX ORDER — ESZOPICLONE 3 MG/1
3 TABLET, FILM COATED ORAL NIGHTLY
Qty: 30 TABLET | Refills: 5 | Status: SHIPPED | OUTPATIENT
Start: 2021-01-01

## 2021-01-01 RX ORDER — SODIUM CHLORIDE, SODIUM LACTATE, POTASSIUM CHLORIDE, CALCIUM CHLORIDE 600; 310; 30; 20 MG/100ML; MG/100ML; MG/100ML; MG/100ML
INJECTION, SOLUTION INTRAVENOUS CONTINUOUS
Status: DISCONTINUED | OUTPATIENT
Start: 2021-01-01 | End: 2021-01-01

## 2021-01-01 RX ORDER — ZOLPIDEM TARTRATE 5 MG/1
5 TABLET ORAL NIGHTLY PRN
Refills: 5 | Status: DISCONTINUED | OUTPATIENT
Start: 2021-01-01 | End: 2021-01-01

## 2021-01-01 RX ORDER — CEFAZOLIN SODIUM 1 G/3ML
INJECTION, POWDER, FOR SOLUTION INTRAMUSCULAR; INTRAVENOUS
Status: DISCONTINUED
Start: 2021-01-01 | End: 2021-01-01 | Stop reason: WASHOUT

## 2021-01-01 RX ORDER — FUROSEMIDE 40 MG/1
20 TABLET ORAL 2 TIMES DAILY
Qty: 60 TABLET | Refills: 6 | Status: SHIPPED | COMMUNITY
Start: 2021-01-01

## 2021-01-01 RX ORDER — SODIUM CHLORIDE, SODIUM LACTATE, POTASSIUM CHLORIDE, CALCIUM CHLORIDE 600; 310; 30; 20 MG/100ML; MG/100ML; MG/100ML; MG/100ML
INJECTION, SOLUTION INTRAVENOUS CONTINUOUS
Status: DISCONTINUED | OUTPATIENT
Start: 2021-01-01 | End: 2021-01-01 | Stop reason: HOSPADM

## 2021-01-01 RX ORDER — PREDNISONE 20 MG/1
40 TABLET ORAL DAILY
Qty: 10 TABLET | Refills: 0 | Status: SHIPPED | OUTPATIENT
Start: 2021-01-01 | End: 2021-01-01 | Stop reason: ALTCHOICE

## 2021-01-01 RX ORDER — LIDOCAINE HYDROCHLORIDE 10 MG/ML
INJECTION, SOLUTION INFILTRATION; PERINEURAL
Status: COMPLETED
Start: 2021-01-01 | End: 2021-01-01

## 2021-01-01 RX ORDER — AZITHROMYCIN 250 MG/1
TABLET, FILM COATED ORAL
Qty: 1 PACKAGE | Refills: 0 | Status: SHIPPED | OUTPATIENT
Start: 2021-01-01 | End: 2021-01-01 | Stop reason: ALTCHOICE

## 2021-01-01 RX ORDER — FUROSEMIDE 20 MG/1
20 TABLET ORAL
Status: DISCONTINUED | OUTPATIENT
Start: 2021-01-01 | End: 2021-01-01

## 2021-01-01 RX ORDER — ONDANSETRON 2 MG/ML
4 INJECTION INTRAMUSCULAR; INTRAVENOUS AS NEEDED
Status: DISCONTINUED | OUTPATIENT
Start: 2021-01-01 | End: 2021-01-01 | Stop reason: HOSPADM

## 2021-01-01 RX ORDER — MIDAZOLAM HYDROCHLORIDE 1 MG/ML
1 INJECTION INTRAMUSCULAR; INTRAVENOUS EVERY 5 MIN PRN
Status: DISCONTINUED | OUTPATIENT
Start: 2021-01-01 | End: 2021-01-01 | Stop reason: HOSPADM

## 2021-01-01 RX ORDER — LIDOCAINE HYDROCHLORIDE AND EPINEPHRINE 10; 10 MG/ML; UG/ML
INJECTION, SOLUTION INFILTRATION; PERINEURAL
Status: COMPLETED
Start: 2021-01-01 | End: 2021-01-01

## 2021-01-01 RX ORDER — HYDROCODONE BITARTRATE AND ACETAMINOPHEN 5; 325 MG/1; MG/1
1 TABLET ORAL EVERY 6 HOURS PRN
Qty: 60 TABLET | Refills: 0 | Status: SHIPPED | OUTPATIENT
Start: 2021-01-01 | End: 2021-01-01

## 2021-01-01 RX ORDER — MIDAZOLAM HYDROCHLORIDE 1 MG/ML
INJECTION INTRAMUSCULAR; INTRAVENOUS
Status: COMPLETED
Start: 2021-01-01 | End: 2021-01-01

## 2021-01-01 RX ORDER — FUROSEMIDE 40 MG/1
40 TABLET ORAL
Status: DISCONTINUED | OUTPATIENT
Start: 2021-01-01 | End: 2021-01-01

## 2021-01-01 RX ORDER — MIDAZOLAM HYDROCHLORIDE 1 MG/ML
INJECTION INTRAMUSCULAR; INTRAVENOUS AS NEEDED
Status: DISCONTINUED | OUTPATIENT
Start: 2021-01-01 | End: 2021-01-01 | Stop reason: SURG

## 2021-01-01 RX ORDER — ROCURONIUM BROMIDE 10 MG/ML
INJECTION, SOLUTION INTRAVENOUS AS NEEDED
Status: DISCONTINUED | OUTPATIENT
Start: 2021-01-01 | End: 2021-01-01 | Stop reason: SURG

## 2021-01-01 RX ORDER — FOLIC ACID 1 MG/1
1 TABLET ORAL DAILY
Status: DISCONTINUED | OUTPATIENT
Start: 2021-01-01 | End: 2021-01-01

## 2021-01-01 RX ORDER — DICYCLOMINE HYDROCHLORIDE 10 MG/1
10 CAPSULE ORAL
COMMUNITY

## 2021-01-01 RX ORDER — SPIRONOLACTONE 25 MG/1
25 TABLET ORAL EVERY OTHER DAY
Status: ON HOLD | COMMUNITY
End: 2021-01-01

## 2021-01-01 RX ORDER — BACITRACIN 50000 [USP'U]/1
INJECTION, POWDER, LYOPHILIZED, FOR SOLUTION INTRAMUSCULAR
Status: COMPLETED
Start: 2021-01-01 | End: 2021-01-01

## 2021-01-01 RX ADMIN — ROCURONIUM BROMIDE 30 MG: 10 INJECTION, SOLUTION INTRAVENOUS at 10:26:00

## 2021-01-01 RX ADMIN — LIDOCAINE HYDROCHLORIDE 30 MG: 10 INJECTION, SOLUTION EPIDURAL; INFILTRATION; INTRACAUDAL; PERINEURAL at 10:26:00

## 2021-01-01 RX ADMIN — SODIUM CHLORIDE, SODIUM LACTATE, POTASSIUM CHLORIDE, CALCIUM CHLORIDE: 600; 310; 30; 20 INJECTION, SOLUTION INTRAVENOUS at 11:20:00

## 2021-01-01 RX ADMIN — MIDAZOLAM HYDROCHLORIDE 2 MG: 1 INJECTION INTRAMUSCULAR; INTRAVENOUS at 10:21:00

## 2021-01-01 RX ADMIN — SODIUM CHLORIDE, SODIUM LACTATE, POTASSIUM CHLORIDE, CALCIUM CHLORIDE: 600; 310; 30; 20 INJECTION, SOLUTION INTRAVENOUS at 14:53:00

## 2021-01-01 RX ADMIN — LIDOCAINE HYDROCHLORIDE 50 MG: 10 INJECTION, SOLUTION EPIDURAL; INFILTRATION; INTRACAUDAL; PERINEURAL at 15:02:00

## 2021-01-01 RX ADMIN — SODIUM CHLORIDE, SODIUM LACTATE, POTASSIUM CHLORIDE, CALCIUM CHLORIDE: 600; 310; 30; 20 INJECTION, SOLUTION INTRAVENOUS at 10:21:00

## 2021-01-01 RX ADMIN — ONDANSETRON 4 MG: 2 INJECTION INTRAMUSCULAR; INTRAVENOUS at 10:47:00

## 2021-01-12 NOTE — TELEPHONE ENCOUNTER
Pt called that he has been holding his spironolactone because he is having a reaction. He states that his nipples are hurting. He states he has had this before and has held it a couple of days and then it goes away. He states doctor is aware of this.  He is

## 2021-01-19 NOTE — PROGRESS NOTES
Hematology/Oncology Clinic Follow Up Visit    Patient Name: Haleigh Birmingham Record Number: [id number]    YOB: 1960    PCP: Dr. Magaly Molina   Other providers: Dr. Manuel Liang (liver)    Reason for Consultation:  Tita mendez nivolumab  -6/7/19- cycle 6 nivolumab  -6/21/19- cycle 7 nivolumab  -6/28/19- CT c/a/p- Significant decrease in the size of the RUL lobe spiculated lung mass (21 x 23mm -> 11 x 14mm).   Decrease in size of heterogeneously enhancing mass in segment 5 of the significant radiographic differences noted despite dramatic response based on AFP levels and clear clinical improvement, therefore consensus is that his malignancy is not evaluable by imaging**  -1/13/20- CT chest- Relatively stable spiculated opacity abiola PET/CT- RUL lung mass with increased FDG uptake to SUV 11.2. Subcm mediastinal LNs without elevated FDG uptake. No other malignant appearing FDG uptake.   Note of new large amt of ascites with increase in size of collateral vessels in the upper abdomen, a both flanks which he thinks is a rib fracture. This focal pain is recreated with deep breaths. Denies any fevers but does have increased fatigue and body aches. He had a mild runny nose with some sneezing. No sore throat. No known COVID-19 exposures. Unspecified open wound, left foot, initial encounter 11/09/2020    left foot   • Visual impairment     glasses   • Vomiting    • Wears glasses 1980   • Weight loss      Past Surgical History:   Procedure Laterality Date   • BIOPSY OF SKIN LESION      face meals., Disp: , Rfl:     •  Multiple Vitamins-Minerals (TAB-A-GALINDO) Oral Tab, Take 1 tablet by mouth daily. , Disp: , Rfl:     •  folic acid 1 MG Oral Tab, Take 1 mg by mouth daily. , Disp: , Rfl:     •  furosemide 40 MG Oral Tab, Take 1 tablet (40 mg total) Examination:  General: Patient is alert and oriented, not in acute distress  Psych: Mood and affect are appropriate  Eyes: EOMI  ENT: Oropharynx is clear  CV: Regular rate and rhythm, no murmurs  Respiratory: Lungs clear to auscultation bilaterally  GI/Abd (H) 08/14/2020    AFPTM 27.9 (H) 07/17/2020    AFPTM 26.2 (H) 06/17/2020    AFPTM 23.8 (H) 05/20/2020    AFPTM 22.0 (H) 04/22/2020    AFPTM 20.2 (H) 03/27/2020    AFPTM 22.9 (H) 02/28/2020    AFPTM 19.9 (H) 01/31/2020    AFPTM 19.7 (H) 01/17/2020    AFPTM 12/17/2018     Lab Results   Component Value Date     06/07/2019     12/17/2018     Lab Results   Component Value Date    HAPT <7.8 (L) 06/07/2019    HAPT 7.8 (L) 12/17/2018     No results found for: [facility] [place]  Lab Results   Component Value Da infiltrates is suspicious for viral pneumonia versus pneumonitis. -I recommended we give a short course–5 days of a prednisone burst with empiric antibiotics. We will then reassess in clinic for improvement.   Would like to avoid prolonged steroids given Healing well    *anemia, leukopenia, thrombocytopenia  -due to liver dysfunction with some low grade DIC, splenomegaly, HCV, malignancy, folate def  -continue 1mg folic acid daily. *insomnia  -Continue melatonin and lunesta 3mg at bedtime.   Encouraged

## 2021-01-19 NOTE — ED PROVIDER NOTES
Patient Seen in: THE [facility] Emergency Department In [place]      History   Patient presents with:  Testing  Difficulty Breathing    Stated Complaint: bodyaches, rib pain, SOB; sent by pcp for covid test and chest xray    HPI/Subjective:   HPI    60-year-o varicose veins of both lower extremities 8/22/2017   • Thrombocytopenia (HCC) 8/22/2017   • Uncomfortable fullness after meals    • Unspecified essential hypertension    • Unspecified open wound, left foot, initial encounter 11/09/2020    left foot   • Vis Systems    Positive for stated complaint: bodyaches, rib pain, SOB; sent by pcp for covid test and chest xray  Other systems are as noted in HPI. Constitutional and vital signs reviewed. All other systems reviewed and negative except as noted above. components:    WBC 3.5 (*)     RBC 3.18 (*)     HGB 10.2 (*)     HCT 31.0 (*)     PLT 85.0 (*)     RDW-SD 50.4 (*)     Lymphocyte Absolute 0.54 (*)     All other components within normal limits   TROPONIN I - Normal   LDH - Normal   RAPID SARS-COV-2 BY PCR pm    Follow-up:  Sola Colon MD  [facility]  [phone]    Schedule an appointment as soon as possible for a visit in 2 days            Medications Prescribed:  Current Discharge Medication List    START taking thes

## 2021-01-19 NOTE — ED INITIAL ASSESSMENT (HPI)
TO ER WITH C/O SOB SINCE Sunday. REPORTS WAS AT [facility] BEING SEEN BY DR Maddy Dawson AND WAS SENT TO ER FOR COVID TESTING.   REPORTS COVID KANCHAN 2 WEEKS AGO AND IT WAS NEGATIVE

## 2021-01-20 PROBLEM — R06.00 ACUTE DYSPNEA: Status: ACTIVE | Noted: 2021-01-01

## 2021-01-20 NOTE — TELEPHONE ENCOUNTER
Federico Elaine MD  P Edw Ricardo Reid Rns             Please call patient and ask if he was given a prescription for prednisone 40 mg daily x5 days in the ED.  I discussed this with the ED attending yesterday but I do not see that it was actually ordered

## 2021-01-22 NOTE — TELEPHONE ENCOUNTER
Pt called stating the new medication that Dr. Joanie Phillips put him on, triamterene is $295 and he cannot afford that. He wants to go back on the medication he had a reaction to, spironolactone. Instructed pt to call Dr. Joanie Phillips office to discuss with them.  He v/u

## 2021-01-27 NOTE — TELEPHONE ENCOUNTER
Pt called and said he is going to see Dr. Torrey Yuen on 2/2/21 and he needs a referral.  He recently got released from the hospital and has pneumonia but is getting better.

## 2021-02-01 PROBLEM — J18.9 PNEUMONIA OF BOTH LUNGS DUE TO INFECTIOUS ORGANISM: Status: ACTIVE | Noted: 2021-01-01

## 2021-02-01 PROBLEM — J18.9 PNEUMONIA OF BOTH LUNGS DUE TO INFECTIOUS ORGANISM, UNSPECIFIED PART OF LUNG: Status: ACTIVE | Noted: 2021-01-01

## 2021-02-01 NOTE — ED PROVIDER NOTES
Patient Seen in: [facility] Emergency Department      History   Patient presents with:  Difficulty Breathing    Stated Complaint: shortness of breath, seen here last week, dx with pneumonia, not feeling better    HPI/Subjective:   HPI    65-year-old tests 7/24/2013   • Family history of bladder cancer 9/23/2014   • Fatigue    • Flatulence/gas pain/belching    • Hepatitis    • Hepatocellular carcinoma ([facility] 3/20/2019   • High blood pressure    • Hypoalbuminemia due to protein-calorie malnutrition ([facility] Tobacco Use      Smoking status: Former Smoker        Packs/day: 1.00        Years: 45.00        Pack years: 39        Types: Cigarettes        Quit date: 2019        Years since quittin.9      Smokeless tobacco: Former User        Types: Snuff Ratio 9.9 (*)     Calcium, Total 8.4 (*)     AST 42 (*)     Alkaline Phosphatase 122 (*)     Bilirubin, Total 2.4 (*)     Albumin 2.4 (*)     Globulin  5.0 (*)     A/G Ratio 0.5 (*)     All other components within normal limits   PRO BETA NATRIURETIC PEPTI multifocal pneumonia     Meropenem ordered         MDM      63year-old with worsening infiltrates despite outpatient treat with antibiotics. His oncologist was concerned for possible immunotherapy-mediated pneumonitis as well.   He will be admitted for fu

## 2021-02-01 NOTE — TELEPHONE ENCOUNTER
Pt called he wants to be seen today he is having pain. Called pt back and spoke with him he states he is having chest pain and cannot breath. Pt states he cannot take a deep breath. Pt states that he has been having this since Saturday.  Instructed pt to go

## 2021-02-02 NOTE — PROGRESS NOTES
[facility]  Progress Note     Jc Guadarrama Patient Status:  Inpatient    10/11/1960 MRN [id number]   [facility] 4NW-A Attending Vikram Davidson MD   Hosp Day # 2 PCP Nette Callahan DO     Chief Complaint: dyspnea    S: Patient input(s): CK in the last 168 hours.     Inflammatory Markers  Recent Labs   Lab 02/01/21  1313 02/01/21  1511 02/02/21  0633 02/03/21  0732   CRP  --  6.71* 8.52* 8.48*   COLIN  --  221.4  --   --    DDIMER 2.99*  --   --   --        Imaging: Imaging data rev

## 2021-02-02 NOTE — DIETARY NOTE
[facility]  NUTRITION INITIAL ASSESSMENT    Pt meets moderate malnutrition criteria.     CRITERIA FOR MALNUTRITION DIAGNOSIS:  Criteria for non-severe malnutrition diagnosis: chronic illness related to wt loss 5% in 1 month and energy intake less than7 : 98 kg (216 lb)  01/19/21 : 98.6 kg (217 lb 6.4 oz)  01/19/21 : 97.5 kg (215 lb)  01/07/21 : 95.2 kg (209 lb 12.8 oz)  12/22/20 : 95.1 kg (209 lb 9.6 oz)    Patient Weight for the past 72 hrs:   Weight   02/01/21 1234 94.3 kg (208 lb)   02/01/21 1702 94. 5

## 2021-02-02 NOTE — ANESTHESIA PREPROCEDURE EVALUATION
PRE-OP EVALUATION    Patient Name: Adarsh Kearns    Pre-op Diagnosis: INPT    Procedure(s):  ENDOBRONCHIAL ULTRASOUND (EBUS) [[id number]], BRONCHOSCOPY WITH TRANSBRONCHIAL AVEOLAR LAVAGE, TRANSBRONCHIAL NEEDLE ASPIRATION      Surgeon(s) and Role:     * Tobias Systolic function was vigorous. The estimated ejection fraction was 65-70%. Doppler parameters are   consistent with abnormal left ventricular relaxation - grade 1 diastolic   dysfunction. Left atrium:  The left atrial volume was moderately increased. 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

## 2021-02-02 NOTE — PROGRESS NOTES
[facility]  Progress Note     Jem Kearns Patient Status:  Inpatient    10/11/1960 MRN [id number]   [facility] 4NW-A Attending Renny Burch MD   [facility] Day # 1 PCP Megan Sanchez DO     Chief Complaint: Dyspnea     S: Patient 02/01/2021    COVID19 Not Detected 02/01/2021    COVID19 Not Detected 01/19/2021       Pro-Calcitonin  Recent Labs   Lab 02/01/21  1511   PCT 0.06       Cardiac  Recent Labs   Lab 02/01/21  1313   TROP <0.045   PBNP 434*       Creatinine Kinase  No results enterocolitis, ulcerative esophagitis and duodenitis with superficial nercrosis, moderate gastritis)              1. PPI  2. Oncology consult - case discussed   9. Elevated d-dimer with lower ext edema, doppler neg  10.  Cirrhosis d/t alcohol and hepatitis

## 2021-02-02 NOTE — CONSULTS
Hematology/Oncology Initial Consultation Note    Patient Name: Terra Son  Medical Record Number: [id number]    YOB: 1960   Date of Consultation: 2/2/2021   Physician requesting consultation: Dr. Yobani Culver    Reason for Consultation nivolumab  -6/7/19- cycle 6 nivolumab  -6/21/19- cycle 7 nivolumab  -6/28/19- CT c/a/p- Significant decrease in the size of the RUL lobe spiculated lung mass (21 x 23mm -> 11 x 14mm).   Decrease in size of heterogeneously enhancing mass in segment 5 of the significant radiographic differences noted despite dramatic response based on AFP levels and clear clinical improvement, therefore consensus is that his malignancy is not evaluable by imaging**  -1/13/20- CT chest- Relatively stable spiculated opacity abiola 31-40%  -3/25/19- PET/CT- RUL lung mass with increased FDG uptake to SUV 11.2. Subcm mediastinal LNs without elevated FDG uptake. No other malignant appearing FDG uptake.   Note of new large amt of ascites with increase in size of collateral vessels in th right hilar lymph node since previous study. Metastatic disease in the liver is again noted and is suboptimally evaluated on a chest CT.  There is no pulmonary embolism.    =========================================  History of Present Illness: 26-year-old m lung squamous cell carcinoma, right ([facility] 3/14/2019   • Severe obesity (BMI 35.0-39. 9) 8/22/2017   • Symptomatic varicose veins of both lower extremities 8/22/2017   • Thrombocytopenia (HCC) 8/22/2017   • Uncomfortable fullness after meals    • Unspecified Take 1 tablet (40 mg total) by mouth every morning before breakfast. Take one tablet (40 mg total) by mouth once daily, 30 minutes prior to breakfast., Disp: 90 tablet, Rfl: 3    •  acetaminophen 500 MG Oral Tab, Take 500-1,000 mg by mouth 2 (two) times da History:  Social History    Social History Narrative      , lives with his wife. No children. Works as a technician specialist- fixes machines, HVAC and plumbing, maintenance.      Social History    Tobacco Use      Smoking status: Former Smoker lymphadenopathy  Skin: no rashes or petechiae    Laboratory:  Recent Labs   Lab 02/01/21  1313 02/02/21  0633   WBC 4.4 3.6*   HGB 10.6* 9.4*   HCT 31.4* 28.4*   PLT 87.0* 73.0*   MCV 96.0 96.3   RDW 14.7 14.6   NEPRELIM 3.50 2.51       Recent Labs   Lab 0 malignancy and ensure no metastases have developed. -Pulmonary consulted, favor bronchoscopy with sampling for atypical infections and/or malignancy.  -If pulmonary has a low suspicion for infection would favor starting Remicade ASAP.   I recommend avoidin while on other abx       Davion Wolf MD  Hematology/Medical [address] [facility]

## 2021-02-02 NOTE — CM/SW NOTE
SW met w/pt to assess for needs. Talked to pt about HH. Pt stated she does not need it at dc. Pt feels he has a good support system in place. SW to follow up if any other needs arise.

## 2021-02-02 NOTE — PLAN OF CARE
Admitted from ER via stretcher alert and oriented, no distress, has had hx of SOB for a few days, was in the ER few days ago and was diagnosed with PNA, was placed on oral antibiotics and symptoms did not improved.  On RA with sats of 97%, lungs are diminis

## 2021-02-02 NOTE — CONSULTS
Pulmonary H&P/Consult       NAME: Jules Sánchez - ROOM: [address] [place] [address] - MRN: [id number] - Age: 63year old - :  10/11/1960    Date of Admission: 2021 12:57 PM  Admission Diagnosis: Pneumonia of both lungs due to infectious organism, unspecified part of 8/22/2017   • Indigestion    • Leukocytopenia 9/23/2014   • Loss of appetite    • Morbid obesity with BMI of 40.0-44.9, adult ([facility] 9/23/2014   • Nausea August 2020   • Personal history of antineoplastic chemotherapy    • Primary lung squamous cell carcino 1 patch onto the skin daily. , Disp: 10 patch, Rfl: 0, 1/31/2021 at 0900    •  Pantoprazole Sodium 40 MG Oral Tab EC, Take 1 tablet (40 mg total) by mouth every morning before breakfast. Take one tablet (40 mg total) by mouth once daily, 30 minutes prior to Sexual Activity      Alcohol use: No        Alcohol/week: 2.0 - 4.0 standard drinks        Types: 2 - 4 Standard drinks or equivalent per week        Frequency: Never        Comment: none, quit 2/2019.  +prior hx of excessive use      Drug use: No      Sex technician specialist- fixes machines, HVAC and plumbing, maintenance.           Family History:  Family History   Problem Relation Age of Onset   • Cancer Neg    • Blood Disorder Neg         Home Medications:    •  Dicyclomine HCl 10 MG Oral Cap, Take 10 m congestion, sinus pain/tenderness  RESPIRATORY: see above   CARDIOVASCULAR:  denies current chest pain   GI:  denies abdominal pain  :  denies dysuria or changes in stream   MUSCULOSKELETAL:  denies back pain   NEURO:  denies headaches, no strokes or sei deformity   Heart:    Regular rate and rhythm, S1 and S2 normal, no murmur, rub   or gallop   Abdomen:     Soft, non-tender, bowel sounds active all four quadrants,     no masses, no organomegaly   Extremities:   Extremities normal, atraumatic, no cyanosis

## 2021-02-02 NOTE — PLAN OF CARE
AOx4. Vitals stable. IV merrem. CT chest w contrast today. Bronch Thursday. NPO at midnight Wednesday. Muscle relaxer PRN for chest/back pain. Lidocaine patches applied to both sides of the back. Oncology consulted.  One positive blood culture from 2/2, not

## 2021-02-03 NOTE — PROGRESS NOTES
Jose Beavers Patient Status:  Inpatient    10/11/1960 MRN [id number]   [facility] 4NW-A Attending Anderson Hines MD   [facility] Day # 2 PCP Mary Kent DO     Pulm / Critical Care Progress Note     S: pt states he fee HCT 31.4* 28.4* 28.1*   PLT 87.0* 73.0* 83.0*     Recent Labs   Lab 02/02/21  0633 02/03/21  0732   INR 1.57* 1.50*         Recent Labs   Lab 02/01/21  1313 02/02/21  0633 02/03/21  0732   * 132* 132*   K 4.1 3.8 3.6    102 102   CO2 23.0 24.

## 2021-02-03 NOTE — PROGRESS NOTES
Hematology/Oncology Progress Note    Patient Name: Asad Napier  Medical Record Number: [id number]    YOB: 1960     Reason for Consultation:  Asad Napier was seen today for the diagnosis of NSCLC, [facility] [address]    Oncologic History:  *Infiltrat Significant decrease in the size of the RUL lobe spiculated lung mass (21 x 23mm -> 11 x 14mm).   Decrease in size of heterogeneously enhancing mass in segment 5 of the liver compared to the previous MRI likely represents treatment response of hepatocellula and clear clinical improvement, therefore consensus is that his malignancy is not evaluable by imaging**  -1/13/20- CT chest- Relatively stable spiculated opacity along the right minor fissure measuring 14 x 11 mm.  A few new inflammatory subcentimeter nonc Hepatic metastatic disease is again noted and appears about stable overall. Cirrhosis and portal hypertension. Splenomegaly is again noted. There is a new splenic infarct.  Subtle lytic lesions in the L3, L4 and L5 vertebral bodies are suspicious for metas increased in size.  The additional metastasis focus within the lateral aspect of the posterior segment right lobe appears relatively stable in size.  No new hepatic lesions are suggested.  Increasing splenomegaly.  -1/19/21- CTA chest- CT findings of multif oz)  01/19/21 : 98 kg (216 lb)  01/19/21 : 98.6 kg (217 lb 6.4 oz)  01/19/21 : 97.5 kg (215 lb)  01/07/21 : 95.2 kg (209 lb 12.8 oz)  12/22/20 : 95.1 kg (209 lb 9.6 oz)    ECOG PS: 1    Physical Examination:  General: Patient is alert and oriented  CV: Reg remicade ASAP after bronch if no infection or progressive malignancy is identified. I recommend avoiding high-dose steroids given prior side effects (necrotizing skin infection) associated with prior prolonged steroid taper.      *Infiltrative hepatocellul diarrhea  -continue vancomycin prophylaxis while on other abx       Karlos Downing MD  Hematology/Medical [address] [facility]

## 2021-02-03 NOTE — PLAN OF CARE
Alert and afebrile, denies any SOB at rest, saturation are stable, in the mid 90's at rest, lungs are diminished, has occasional productive cough with blood tinged to pink tinged sputum, no clots noted.  Seen by pulm, scheduled patient for bronchoscopy nova

## 2021-02-03 NOTE — PLAN OF CARE
Pt A/O x4. Vitals stable. Afebrile. Pt c/o mild chest pain but declines need for medication at this time. IV merrem per MAR. Plan for bronch on Thursday and NPO at midnight on Wednesday. Ambien PRN for sleep. Call light within reach.

## 2021-02-04 NOTE — ANESTHESIA PROCEDURE NOTES
Airway  Date/Time: 2/4/2021 10:27 AM  Urgency: elective    Airway not difficult    General Information and Staff    Patient location during procedure: OR  Anesthesiologist: Drake Hutchins MD  Performed: anesthesiologist     Indications and Patient Dontae

## 2021-02-04 NOTE — PROGRESS NOTES
Hematology/Oncology Progress Note    Patient Name: Haleigh Birmingham Record Number: [id number]    YOB: 1960     Reason for Consultation:  Rhonda Villalpando was seen today for the diagnosis of [facility]    Interval events: Patient is mo Lab 02/01/21  1313 02/02/21  0633 02/03/21  0732   * 132* 132*   K 4.1 3.8 3.6    102 102   CO2 23.0 24.0 26.0   BUN 8 10 10   CREATSERUM 0.81 0.59* 0.65*   GFRAA 111 126 121   GFRNAA 96 109 105   * 109* 131*   CA 8.4* 8.7 7.8*   TP 7. not plan to resume immunotherapy unless develops clear recurrence of malignancy given his prior adverse side effects. Thus far his AFP has been stable therefore I suspect his disease remains in good control.   On my review of repeat CT 2/2/21 it appears hi this time. Dr. Phi Dsouza is in agreement. Will give first dose of Remicade 5 mg/kg IV now. Patient can discharge home after this infusion. I will arrange for him to follow-up with me in clinic next week.

## 2021-02-04 NOTE — PLAN OF CARE
Zechariah Galindo  10/11/1960  Temp: 97.9 °F (36.6 °C)  Pulse: 66  Resp: 19  BP: 108/63    Patient back from bronch alert and oriented x 3, VSS. SR on tele.  Ambulatory to bathroom with standby assist.     Irena Hicks RN

## 2021-02-04 NOTE — PLAN OF CARE
Assumed care at 0730. Pt a/ox4, VSS, on RA, NSR on telemetry. Pt with c/o CP, pt has had for weeks, PRN tylenol per MAR. IV merrem q8h. PO vanco prophylactically, hx of CDIFF. Up ad carmen. No c/o n/v/d, SOB, dizziness/lightheadedness.  NPO for bronchoscopy to

## 2021-02-04 NOTE — PLAN OF CARE
Patient is A&Ox4; cooperative and pleasant  Complained of mid chest pain - when taking a deep breath - tylenol given   No n/v/d  Afebrile, VSS  On merrem   NSR on tele; on room air  Up ad carmen  NPO after midnight  Plan for [facility] tomorrow 2/4/21  Consent has

## 2021-02-04 NOTE — OPERATIVE REPORT
Bronchoscopy procedure report    Preop diagnosis: abnl ct  Postop diagnosis:  same  Procedure performed: Bronchoscopy, Diagnostic  Bronchoalveolar lavage, BAL  Transbronchial biopsy  tbna with ebus guidance    Sedation used:  general anesthesia    Descript

## 2021-02-04 NOTE — PROGRESS NOTES
Jose Beavers Patient Status:  Inpatient    10/11/1960 MRN [id number]   [facility] 3NE-A Attending Jef Palacios MD   [facility] Day # 3 PCP Jori Mauro DO     Pulm / Critical Care Progress Note     S: feels well this 02/02/21  0633 02/03/21  0732   INR 1.57* 1.50*         Recent Labs   Lab 02/01/21  1313 02/02/21  0633 02/03/21  0732   * 132* 132*   K 4.1 3.8 3.6    102 102   CO2 23.0 24.0 26.0   BUN 8 10 10     CREATININE (mg/dL)   Date Value   07/19/2014

## 2021-02-04 NOTE — PROGRESS NOTES
[facility]  Progress Note     Yoselin Kim Patient Status:  Inpatient    10/11/1960 MRN [id number]   [facility] 4NW-A Attending Clif Slaughter MD   Hosp Day # 3 PCP Krista Hooper DO     Chief Complaint: dyspnea    S: Patient Creatinine Kinase  No results for input(s): CK in the last 168 hours.     Inflammatory Markers  Recent Labs   Lab 02/01/21  1313 02/01/21  1511 02/02/21  0633 02/03/21  0732   CRP  --  6.71* 8.52* 8.48*   COLIN  --  221.4  --   --    DDIMER 2.99*  -- onc for second dose. Discussed with pt in detail.     Li Hampton MD

## 2021-02-04 NOTE — ANESTHESIA POSTPROCEDURE EVALUATION
Jose Beavers Patient Status:  Inpatient   Age/Gender 63year old male MRN [id number]   Location [facility]. Attending Stacy Reyes MD   [facility] Day # 3 PCP Jonatan Lopez DO       Anesthesia Post-op Note    Procedure(s)

## 2021-02-05 NOTE — PROGRESS NOTES
Initial Post Discharge Follow Up   Discharge Date: 2/4/21  Contact Date: 2/5/2021    Consent Verification:  Assessment Completed With: Patient  HIPAA Verified? Yes    Discharge Dx:  1. Dyspnea  2. Presumptive Gram Neg PNA  3. Pneumonitis  4. Hx of c.  D MG Oral Cap Take 10 mg by mouth 4 (four) times daily before meals and nightly. • Eszopiclone (LUNESTA) 3 MG Oral Tab Take 3 mg by mouth nightly. 30 tablet 5   • lidocaine 4 % External Patch Place 1 patch onto the skin daily.  10 patch 0   • Pantoprazole Needs post D/C:   Now that you are home, are there any needs or concerns you need addressed before your next visit with your PCP?  (DME, meds, disease concerns, Etc): No     Follow up appointments:      Your appointments     Date & Time Appointment Depar cannot make your appointments? No     NCM Reviewed upcoming Specialist Appt with patient     Yes     Interventions by NCM:  FADI reviewed recent admission and discharge instructions with patient.    He denies any new or worsening symptoms and states his SO

## 2021-02-05 NOTE — DISCHARGE SUMMARY
[facility]ITAL[facility]  DISCHARGE SUMMARY     Gerri Perez Patient Status:  Inpatient    10/11/1960 MRN [id number]   [facility] 3NE-A Attending No att. providers found   Hosp Day # 3 PCP Mary Kent DO     Date of Admission: 2021  D Score: 83  59-90 High Risk  29-58 Medium Risk  0-28   Low Risk         TCM Follow-Up Recommendation:  LACE > 58:  High Risk of readmission after discharge from the hospital.      Procedures during hospitalization:   • bronchoscopy    Consultants:  • pulm  • once daily, 30 minutes prior to breakfast.   Quantity: 90 tablet  Refills: 3     Tab-A-Radha Tabs      Take 1 tablet by mouth daily.    Refills: 0        STOP taking these medications    triamterene 50 MG Caps  Commonly known as: DYRENIUM              Where

## 2021-02-09 NOTE — PROGRESS NOTES
Hematology/Oncology Clinic Follow Up Visit    Patient Name: Haleigh Birmingham Record Number: [id number]    YOB: 1960    PCP: Dr. Robert Britton   Other providers: Dr. Vi Brooks (liver)    Reason for Consultation:  Mignon mendez nivolumab  -6/7/19- cycle 6 nivolumab  -6/21/19- cycle 7 nivolumab  -6/28/19- CT c/a/p- Significant decrease in the size of the RUL lobe spiculated lung mass (21 x 23mm -> 11 x 14mm).   Decrease in size of heterogeneously enhancing mass in segment 5 of the significant radiographic differences noted despite dramatic response based on AFP levels and clear clinical improvement, therefore consensus is that his malignancy is not evaluable by imaging**  -1/13/20- CT chest- Relatively stable spiculated opacity abiola with squamous cell carcinoma. PD-L1+, TPS 31-40%  -3/25/19- PET/CT- RUL lung mass with increased FDG uptake to SUV 11.2. Subcm mediastinal LNs without elevated FDG uptake. No other malignant appearing FDG uptake.   Note of new large amt of ascites with in Slight interval increase in size of a right hilar lymph node since previous study. Metastatic disease in the liver is again noted and is suboptimally evaluated on a chest CT.  There is no pulmonary embolism.    -2/2/21- CT c/a/p- There are worsening ground- course of antibiotics today. He has not had any fevers or increased cough. His appetite is on the low side.     Still with ongoing nipple tenderness, the spironolactone was switched to triamterene, but it does not seem he ever was able to  the tria BIOPSY OF SKIN LESION      face and leg   • COLONOSCOPY N/A 9/17/2020    Performed by Margaret Everett MD at [facility] ENDOSCOPY   • ENDOBRONCHIAL ULTRASOUND (EBUS) N/A 2/4/2021    Performed by Emerson Yeager MD at [facility] ENDOSCOPY   • ESOPHAGOGASTRODUODENOSCOPY ( tablet, Rfl: 3    •  acetaminophen 500 MG Oral Tab, Take 500-1,000 mg by mouth 2 (two) times daily as needed for Pain., Disp: , Rfl:     •  carvedilol 12.5 MG Oral Tab, Take 12.5 mg by mouth 2 (two) times daily with meals. , Disp: , Rfl:     •  Multiple Vit oz)  01/19/21 : 98 kg (216 lb)  01/19/21 : 98.6 kg (217 lb 6.4 oz)  01/19/21 : 97.5 kg (215 lb)  01/07/21 : 95.2 kg (209 lb 12.8 oz)    ECOG PS: 1    Physical Examination:  General: Patient is alert and oriented, not in acute distress  Psych: Mood and affe 02/09/2021    AFPTM 32.6 (H) 01/19/2021    AFPTM 36.2 (H) 12/22/2020    AFPTM 32.2 (H) 11/24/2020    AFPTM 30.0 (H) 10/16/2020    AFPTM 18.5 (H) 09/18/2020    AFPTM 36.3 (H) 08/22/2020    AFPTM 24.8 (H) 08/14/2020    AFPTM 27.9 (H) 07/17/2020    AFPTM 26. 2 Lab Results   Component Value Date    MMA 0.21 12/17/2018     Lab Results   Component Value Date    FOLIC 7.5 (L) 99/80/4481    FOLIC 7.7 22/00/4252     Lab Results   Component Value Date    COPPER 92 12/17/2018     Lab Results   Component Value Date G Clot Strength      4.5 - 11.0 Kd/sc 3.2 (L)   % Lysis 30 min      0.0 - 8.0 % 0.0   TEG Heparinase Specimen       Other   Split Time      min 5.2   R Time      5.0 - 10.0 min 5.7   K Time      1.0 - 3.0 min 3.2 (H)   Alpha Angle      53.0 - 72.0 deg 56 unresectable  -Unfortunately his liver malignancy is not reliably evaluated by any form of imaging- CT, PET/CT, or MRI, therefore have primarily assessed clinically and with serial AFP monitoring.   -on nivolumab from 3/28/19- 8/14/20 with an excellent res vancomycin prophylaxis through completion of other abx. *Hypokalemia  -Patient to call and notify me if he is going to be starting the triamterene, if so, will repeat CMP next week without supplementation.   If not starting triamterene, we will start K-D

## 2021-02-10 PROBLEM — R18.8 POORLY CONTROLLED ASCITES: Status: RESOLVED | Noted: 2019-04-12 | Resolved: 2021-01-01

## 2021-02-10 PROBLEM — M79.672 LEFT FOOT PAIN: Status: RESOLVED | Noted: 2019-06-21 | Resolved: 2021-01-01

## 2021-02-10 PROBLEM — E88.09 HYPOALBUMINEMIA DUE TO PROTEIN-CALORIE MALNUTRITION (HCC): Status: RESOLVED | Noted: 2017-08-22 | Resolved: 2021-01-01

## 2021-02-10 PROBLEM — R73.9 HYPERGLYCEMIA: Status: RESOLVED | Noted: 2020-01-01 | Resolved: 2021-01-01

## 2021-02-10 PROBLEM — E87.20 METABOLIC ACIDOSIS: Status: RESOLVED | Noted: 2020-01-01 | Resolved: 2021-01-01

## 2021-02-10 PROBLEM — R10.11 RUQ ABDOMINAL PAIN: Status: RESOLVED | Noted: 2020-02-28 | Resolved: 2021-01-01

## 2021-02-10 PROBLEM — K92.1 MELANOTIC STOOLS: Status: RESOLVED | Noted: 2020-01-01 | Resolved: 2021-01-01

## 2021-02-10 PROBLEM — E46 HYPOALBUMINEMIA DUE TO PROTEIN-CALORIE MALNUTRITION (HCC): Status: RESOLVED | Noted: 2017-08-22 | Resolved: 2021-01-01

## 2021-02-10 PROBLEM — R53.0 NEOPLASTIC MALIGNANT RELATED FATIGUE: Status: RESOLVED | Noted: 2020-01-01 | Resolved: 2021-01-01

## 2021-02-10 PROBLEM — K92.1 MELENA: Status: RESOLVED | Noted: 2020-01-01 | Resolved: 2021-01-01

## 2021-02-10 PROBLEM — K08.89 PAIN, DENTAL: Status: RESOLVED | Noted: 2019-11-08 | Resolved: 2021-01-01

## 2021-02-10 PROBLEM — R79.89 AZOTEMIA: Status: RESOLVED | Noted: 2020-01-01 | Resolved: 2021-01-01

## 2021-02-10 PROBLEM — K92.2 LOWER GI BLEEDING: Status: RESOLVED | Noted: 2020-01-01 | Resolved: 2021-01-01

## 2021-02-10 PROBLEM — R10.9 ABDOMINAL PAIN, ACUTE: Status: RESOLVED | Noted: 2020-01-01 | Resolved: 2021-01-01

## 2021-02-10 PROBLEM — E88.09 HYPOALBUMINEMIA: Status: RESOLVED | Noted: 2019-02-14 | Resolved: 2021-01-01

## 2021-02-10 PROBLEM — N17.9 ACUTE KIDNEY INJURY (HCC): Status: RESOLVED | Noted: 2020-01-01 | Resolved: 2021-01-01

## 2021-02-10 PROBLEM — E87.2 METABOLIC ACIDOSIS: Status: RESOLVED | Noted: 2020-01-01 | Resolved: 2021-01-01

## 2021-02-10 NOTE — TELEPHONE ENCOUNTER
Patient states he is doing ok. He declined appointment for today because he has pneumonia and does not want to go outside to breath the cold air. States the mask and the cold will worsen his symptoms.    Patient has appointment 2/15/21 but will call if tyree

## 2021-02-10 NOTE — TELEPHONE ENCOUNTER
Pt called and cancelled his appt for 2:30 today because he does not feel enough to come in. He rescheduled for Monday @ 2:30.

## 2021-02-16 NOTE — PROGRESS NOTES
Education Record    Learner:  Patient    Disease / Diagnosis: immune mediated colitis, remicade infusion.     Barriers / Limitations:  None   Comments:    Method:  Discussion and Reinforcement   Comments:    General Topics:  Medication, Side effects and sym

## 2021-02-16 NOTE — PROGRESS NOTES
Hematology/Oncology Clinic Follow Up Visit    Patient Name: Haleigh Birmingham Record Number: [id number]    YOB: 1960    PCP: Dr. Yolanda Knox   Other providers: Dr. Pat Soto (liver)    Reason for Consultation:  Asad mendez nivolumab  -6/7/19- cycle 6 nivolumab  -6/21/19- cycle 7 nivolumab  -6/28/19- CT c/a/p- Significant decrease in the size of the RUL lobe spiculated lung mass (21 x 23mm -> 11 x 14mm).   Decrease in size of heterogeneously enhancing mass in segment 5 of the significant radiographic differences noted despite dramatic response based on AFP levels and clear clinical improvement, therefore consensus is that his malignancy is not evaluable by imaging**  -1/13/20- CT chest- Relatively stable spiculated opacity abiola biopsy of RUL nodule-allergy consistent with squamous cell carcinoma. PD-L1+, TPS 31-40%  -3/25/19- PET/CT- RUL lung mass with increased FDG uptake to SUV 11.2. Subcm mediastinal LNs without elevated FDG uptake. No other malignant appearing FDG uptake. viral pneumonia could have this appearance. Slight interval increase in size of a right hilar lymph node since previous study. Metastatic disease in the liver is again noted and is suboptimally evaluated on a chest CT.  There is no pulmonary embolism.    -2 dysphagia or odynophagia. Mild hemorrhoidal bleeding. No dark stools.     Past Medical History:  Past Medical History:   Diagnosis Date   • Abdominal distention August 2020   • Abdominal pain    • Alcoholism /alcohol abuse ([facility] [address] 9/23/2014   • Black stools ESOPHAGOGASTRODUODENOSCOPY (EGD) N/A 8/23/2020    Performed by Zeeshan Louis MD at [address] Left 10/2/2020    Performed by Jose Humphrey DPM at [facility] MAIN OR   • IR VARICOSE VEIN ENDOVENOUS LASER AB Works as a technician specialist- fixes machines, HVAC and plumbing, maintenance.      Social History    Tobacco Use      Smoking status: Former Smoker        Packs/day: 1.00        Years: 45.00        Pack years: 45        Types: Cigarettes        Quit husam edema.  +large varicose veins present.       Laboratory:  Lab Results   Component Value Date    WBC 6.8 02/16/2021    WBC 5.1 02/09/2021    WBC 3.6 (L) 02/03/2021    HGB 9.0 (L) 02/16/2021    HGB 9.6 (L) 02/09/2021    HGB 9.7 (L) 02/03/2021    HCT 26.8 (L) 16. 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 -It will take up to 4 weeks before final fungal and AFB cultures are back, but are negative thus far.  Given suspicion for atypical infec side effects.  -Thus far his AFP has been stable therefore I suspect his disease remains in good control. On my review of repeat CT 2/2/21 it appears his hepatic disease is stable without significant change.   Lytic appearing bone lesions at L3, L4, L5 wer

## 2021-02-23 PROBLEM — C34.90 METASTATIC NON-SMALL CELL LUNG CANCER (HCC): Status: ACTIVE | Noted: 2021-01-01

## 2021-02-23 PROBLEM — E80.6 HYPERBILIRUBINEMIA: Status: ACTIVE | Noted: 2021-01-01

## 2021-02-23 NOTE — PROGRESS NOTES
Outpatient Oncology Care Plan  Problem list:  loss of appetite  pain  respiratory  fatigue  weakness, legs/feet/abd swelling, body aches, back and chest pain    Problems related to:    disease/disease progression    Interventions:  provided general teachin

## 2021-02-23 NOTE — TELEPHONE ENCOUNTER
LVM for [facility] making sure there were no issues for this patient to  his morphine today. Asked for call back if anything is needed from our office.

## 2021-02-23 NOTE — PROGRESS NOTES
Hematology/Oncology Clinic Follow Up Visit    Patient Name: Haleigh Birmingham Record Number: [id number]    YOB: 1960    PCP: Dr. Genevieve Stark   Other providers: Dr. Ankita Diggs (liver)    Reason for Consultation:  Iman mendez nivolumab  -6/7/19- cycle 6 nivolumab  -6/21/19- cycle 7 nivolumab  -6/28/19- CT c/a/p- Significant decrease in the size of the RUL lobe spiculated lung mass (21 x 23mm -> 11 x 14mm).   Decrease in size of heterogeneously enhancing mass in segment 5 of the significant radiographic differences noted despite dramatic response based on AFP levels and clear clinical improvement, therefore consensus is that his malignancy is not evaluable by imaging**  -1/13/20- CT chest- Relatively stable spiculated opacity abiola biopsy of RUL nodule-allergy consistent with squamous cell carcinoma. PD-L1+, TPS 31-40%  -3/25/19- PET/CT- RUL lung mass with increased FDG uptake to SUV 11.2. Subcm mediastinal LNs without elevated FDG uptake. No other malignant appearing FDG uptake. viral pneumonia could have this appearance. Slight interval increase in size of a right hilar lymph node since previous study. Metastatic disease in the liver is again noted and is suboptimally evaluated on a chest CT.  There is no pulmonary embolism.    -2 some patchy airspace opacities within the lungs.   Some of these areas demonstrate no significant increased radiotracer uptake but are new when compared with the CT exam of January 2021.    ==============================  Interval events: Presents for florence history of antineoplastic chemotherapy    • [facility] 3/14[facility]   • Severe obesity (BMI 35.0-39. 9) 8/22/2017   • Symptomatic varicose veins of both lower extremities 8/22/2017   • Thrombocytopenia ([facility] 8/22/2017   • for Pain., Disp: 120 tablet, Rfl: 0    •  Potassium Chloride ER 20 MEQ Oral Tab CR, Take 1 tablet (20 mEq total) by mouth daily. , Disp: 60 tablet, Rfl: 2    •  Dicyclomine HCl 10 MG Oral Cap, Take 10 mg by mouth 4 (four) times daily before meals and nightl No    Family Medical History:  Family History   Problem Relation Age of Onset   • Cancer Neg    • Blood Disorder Neg      Review of Systems:  A 10-point ROS was done with pertinent positives and negative per the HPI    Vital Signs:  Height: 180.3 cm (5' 10 ANIONGAP 10 02/23/2021     10/25/2016    GFRNAA 98 02/23/2021    GFRAA 113 02/23/2021    CA 8.3 (L) 02/23/2021    OSMOCALC 266 (L) 02/23/2021    ALKPHO 187 (H) 02/23/2021    AST 95 (H) 02/23/2021    ALT 45 02/23/2021    BILT 6.2 (H) 02/23/2021    TP Lab Results   Component Value Date    REITCPERCENT 3.0 (H) 09/03/2020    REITCPERCENT 1.4 08/14/2020    REITCPERCENT 2.0 06/07/2019    REITCPERCENT 2.0 12/17/2018     Lab Results   Component Value Date    RETHE 44.2 (H) 09/03/2020    RETHE 41.5 (H) 08/ 10/16/2020    TSH 2.290 07/17/2020    TSH 1.270 04/22/2020    TSH 1.650 01/31/2020    TSH 2.650 12/20/2019    TSH 1.740 11/08/2019    TSH 1.600 09/27/2019    TSH 1.710 09/13/2019    TSH 1.010 08/16/2019    TSH 2.150 06/21/2019    TSH 1.410 05/10/2019    TS reliably evaluated by any form of imaging- CT, PET/CT, or MRI, therefore have primarily assessed clinically and with serial AFP monitoring.   -on nivolumab from 3/28/19- 8/14/20 with an excellent response, however has been stopped d/t development of recurr folic acid daily. *insomnia  -Continue melatonin and lunesta 3mg at bedtime. *Right inguinal hernia  -Is reducible    *hx of c diff diarrhea  -resume vancomycin prophylaxis if other abx are given in the near future.      *Hypokalemia  -Better, cont

## 2021-02-23 NOTE — TELEPHONE ENCOUNTER
Direct admission arranged for tomorrow 2/24/2021 for hyperbilirubinemia per Dr. Ana Cristina Wilcox. Accepting hospitalist Dr. Dae Hines. I called Zechariah to inform him to come tomorrow to check in at ER registration at [facility].  Patient agreeable.

## 2021-02-24 PROBLEM — C34.90 NON-SMALL CELL LUNG CANCER (HCC): Status: ACTIVE | Noted: 2021-01-01

## 2021-02-24 PROBLEM — R74.01 TRANSAMINITIS: Status: ACTIVE | Noted: 2021-01-01

## 2021-02-24 NOTE — CONSULTS
Hematology/Oncology Initial Consultation Note    Patient Name: Haleigh Birmingham Record Number: [id number]    YOB: 1960   Date of Consultation: 2/24/2021   Physician requesting consultation: Dr. Lorri Buchanan    Reason for Consultation nivolumab  -6/7/19- cycle 6 nivolumab  -6/21/19- cycle 7 nivolumab  -6/28/19- CT c/a/p- Significant decrease in the size of the RUL lobe spiculated lung mass (21 x 23mm -> 11 x 14mm).   Decrease in size of heterogeneously enhancing mass in segment 5 of the significant radiographic differences noted despite dramatic response based on AFP levels and clear clinical improvement, therefore consensus is that his malignancy is not evaluable by imaging**  -1/13/20- CT chest- Relatively stable spiculated opacity abiola guided core biopsy of RUL nodule-allergy consistent with squamous cell carcinoma. PD-L1+, TPS 31-40%  -3/25/19- PET/CT- RUL lung mass with increased FDG uptake to SUV 11.2. Subcm mediastinal LNs without elevated FDG uptake.   No other malignant appearing F fungal or viral pneumonia could have this appearance. Slight interval increase in size of a right hilar lymph node since previous study. Metastatic disease in the liver is again noted and is suboptimally evaluated on a chest CT.  There is no pulmonary embol development of some patchy airspace opacities within the lungs.  Some of these areas demonstrate no significant increased radiotracer uptake but are new when compared with the CT exam of January 2021.    =============================================  Histo 40.0-44.9, adult ([facility] 9/23/2014   • Nausea August 2020   • Personal history of antineoplastic chemotherapy    • Primary lung squamous cell carcinoma, right ([facility] 3/14/2019   • Severe obesity (BMI 35.0-39. 9) 8/22/2017   • Symptomatic varicose veins of both Eduardo Verdugo MD, Last Rate: 250 mL/hr at 02/04/21 1832    •  [COMPLETED] iohexol (OMNIPAQUE) 350 MG/ML injection 100 mL, 100 mL, Intravenous, ONCE PRN, Nacho Ring MD, 100 mL at 02/02/21 1245    •  [COMPLETED] meropenem (MERREM) 1 g in sodium chloride 0. Pantoprazole Sodium  40 mg Oral QAM AC   • spironolactone  25 mg Oral QOD   • furosemide  40 mg Oral BID   • folic acid  1 mg Oral Daily   • carvedilol  12.5 mg Oral BID with meals   • enoxaparin  40 mg Subcutaneous Daily         PRN Meds:  morphINE Sulfat EOMI  ENT: Oropharynx is clear  CV: Regular rate and rhythm, no murmurs  Respiratory: Lungs clear to auscultation bilaterally  GI/Abd: +RUQ abd tenderness, no palpable abnormalities. No significant ascites. Normal bowel sounds. No hepatosplenomegaly.    Debara Bosworth AFPTM 442.5 (H) 05/10/2019    AFPTM 2,006.4 (H) 04/26/2019    AFPTM 9,617.9 (H) 04/12/2019    AFPTM 30,440.0 (H) 03/27/2019    AFPTM 28,614.5 (H) 03/16/2019    AFPTM 3,785.7 (H) 01/21/2019     Lab Results   Component Value Date     (H) 02/16/2021 brain for baseline today given newly metastatic NSCLC  -We will also plan for Port-A-Cath placement if possible.   If not able to perform as inpatient will place a PICC instead   -Tentatively plan to initiate chemotherapy no later than next week.     *Misty

## 2021-02-24 NOTE — PROGRESS NOTES
At request of Dr. Jen Johnson,  called and left Patient a message to offer assistance on completing POAH; awaiting call back.

## 2021-02-24 NOTE — CONSULTS
[facility]                       Gastroenterology [address] [facility] Gastroenterology    [facility] Payer Patient Status:  Inpatient    10/11/1960 MRN [id number]   [facility] 4NW-A Attending Rafael Locke, [address] [facility] Day # 0 [facility] ([facility] 3/20/2019   • High blood pressure    • Hypoalbuminemia due to protein-calorie malnutrition ([facility] 8/22/2017   • Indigestion    • Leukocytopenia 9/23/2014   • Loss of appetite    • Morbid obesity with BMI of 40.0-44.9, adult ([facility] 9/23/2014   • Nausea morphINE Sulfate IR (MSIR) tab 7.5-15 mg, 7.5-15 mg, Oral, Q4H PRN    •  Pantoprazole Sodium (PROTONIX) EC tab 40 mg, 40 mg, Oral, QAM AC    •  spironolactone (ALDACTONE) tab 25 mg, 25 mg, Oral, QOD    •  HYDROcodone-acetaminophen (NORCO) 5-325 MG per tab anxiety, suicidal ideation, or homicidal ideation           Oncologic: + [facility] [facility].           ENT: The patient reports no hoarseness of voice, hearing loss, sinus congestion, tinnitus           Neurologic: The patient reports no history of seizure, stroke, YONI STOREY, PET/CT STANDARD BODY SCAN (ONCOLOGY) (CPT=78815), 3/25/2019, 2:22 PM.     INDICATIONS:  C22.0 Hepatocellular carcinoma ([facility] C34.91 Primary lung squamous cell carcinoma, right (HCC) J18.9 Pneumonitis     TECHNIQUE:  The patient fasted for at l are present. These are better appreciated on recent CTA. There is likely a dominant mass of the right hepatic lobe near the vincent   hepatis. Max SUV measures up to 10.1 in this region (image 210 of series 4).   Posterior right hepatic lobe masslike area tx with Remicade now admitted to undergo w/u of elevated LFTs prior to initiation of chemotherapy for worsening metastatic dx noted on PET scan 2/22/2021. AST 99 ALT 46 Alk Phos 187 Bili 5.9 with normal kidney function.  Will await MR results to determine i close eye on sodium given low value; low threshold to consult nephrology for aid in diuretics    Jeff Parker, 02/24/21, 2:55 [address] [place] [address] Gastroenterology  [phone]

## 2021-02-24 NOTE — CM/SW NOTE
02/24/21 1000   CM/SW Referral Data   Referral Source Social Work (self-referral)   Reason for Referral Discharge planning   Informant Other  (chart review)   Patient Info   Patient's Mental Status Alert;Oriented   Patient's [address]   [facility]

## 2021-02-24 NOTE — PLAN OF CARE
NURSING ADMISSION NOTE      Patient admitted via Cart  Oriented to room. Safety precautions initiated. Bed in low position. Call light in reach. A&Ox4. Wife at the bedside- updated with POC.    Patient reports abdominal pain and mid/upper back p

## 2021-02-24 NOTE — H&P
[facility]  History and Physical     [facility] Patient Status:  Inpatient    10/11/1960 MRN [id number]   [facility] 4NW-A Attending Tessa Hopper MD   Hosp Day # 0 PCP Ruth Fernandes DO     Chief Complaint: Elevated LFTS Visual impairment     glasses   • Vomiting    • Wears glasses 1980   • Weight loss         Past Surgical History:   Past Surgical History:   Procedure Laterality Date   • BIOPSY OF SKIN LESION      face and leg   • COLONOSCOPY N/A 9/17/2020    Performed by 0    •  Dicyclomine HCl 10 MG Oral Cap, Take 10 mg by mouth 4 (four) times daily before meals and nightly., Disp: , Rfl:     •  Eszopiclone (LUNESTA) 3 MG Oral Tab, Take 3 mg by mouth nightly., Disp: 30 tablet, Rfl: 5    •  Pantoprazole Sodium 40 MG Oral T Moves all extremities. Extremities: No edema or cyanosis. Integument: No rashes or lesions. Psychiatric: Appropriate mood and affect.       Diagnostic Data:      Labs:  Recent Labs   Lab 02/23/21  0847   WBC 10.2   HGB 9.3*   MCV 92.3   .0*

## 2021-02-25 NOTE — ANESTHESIA POSTPROCEDURE EVALUATION
[facility]    Rowena Castellanos Patient Status:  Inpatient   Age/Gender 63year old male MRN [id number]   Location [address] [facility]. Attending Danielle Baca, [address] [facility] Day # 1 PCP Dhruv Gamez DO       Anesthesia Post-op Note    Procedure(s):

## 2021-02-25 NOTE — PROGRESS NOTES
02/25/21 0813   Clinical Encounter Type   Visited With Health care provider;Patient  (Checked in with patient's RN for decisional capacity.)   Routine Visit   (Responded to [facility]" consult)   Per consult,  invited patient into an [facility] [employer]

## 2021-02-25 NOTE — PLAN OF CARE
Alert  and oriented, BLE is swollen, abd is softly  distended, c/o moderate abd pain, only requesting 7.5 mg of MSIR, denies headache. Poor appetite. NPO after midnight for possible GI procedure, patient verbalized understanding.  Fall prec., patient assist changes in mentation and behavior  - Position to facilitate oxygenation and minimize respiratory effort  - Oxygen supplementation based on oxygen saturation or ABGs  - Provide Smoking Cessation handout, if applicable  - Encourage broncho-pulmonary hygiene

## 2021-02-25 NOTE — ANESTHESIA PREPROCEDURE EVALUATION
PRE-OP EVALUATION    Patient Name: Nick Carreon    Pre-op Diagnosis: Elevated LFT's    Procedure(s):  ENDOSCOPIC RETROGRADE CHOLANGIOPANCREATOGRAPHY    Surgeon(s) and Role:     Michael Joshua MD - Primary    Pre-op vitals reviewed.   Temp: 98.5 °F Rfl: , 2/23/2021 at Unknown time    •  Eszopiclone (LUNESTA) 3 MG Oral Tab, Take 3 mg by mouth nightly., Disp: 30 tablet, Rfl: 5, 2/23/2021 at Unknown time    •  Pantoprazole Sodium 40 MG Oral Tab EC, Take 1 tablet (40 mg total) by mouth every morning befo and leg   • COLONOSCOPY N/A 9/17/2020    Performed by Grey Cox MD at [address] [place] [address] (EBUS) N/A 2/4/2021    Performed by Galina Frey MD at [facility] ENDOSCOPY   • ESOPHAGOGASTRODUODENOSCOPY (EGD) N/A 11/3/2020    Performed 02/25/2021    CL 95 (L) 02/25/2021    CO2 23.0 02/25/2021    BUN 14 02/25/2021    CREATSERUM 0.61 (L) 02/25/2021     (H) 02/25/2021    CA 8.2 (L) 02/25/2021     Lab Results   Component Value Date    INR 1.80 (H) 02/25/2021         Airway      [place]

## 2021-02-25 NOTE — PROGRESS NOTES
Hematology/Oncology Progress Consultation Note    Patient Name: Sonja Valverde  Medical Record Number: [id number]    YOB: 1960     Reason for Consultation:  Sonja Valverde was seen today for the diagnosis of [address] NSCLC    Interval events: 6. 29 8.81*       Recent Labs   Lab 02/23/21  0847 02/24/21  1008 02/25/21  0614   * 128* 128*   K 3.6 3.2* 3.9   CL 94* 93* 95*   CO2 22.0 25.0 23.0   BUN 14 13 14   CREATSERUM 0.77 0.75 0.61*   GFRAA 113 114 125   GFRNAA 98 99 108   * 157* 11 ESRML 37 (H) 06/07/2019    ESRML 25 (H) 12/17/2018     Lab Results   Component Value Date    CRP 12.80 (H) 02/23/2021    CRP 11.10 (H) 02/16/2021    CRP 9.67 (H) 02/09/2021    CRP 8.48 (H) 02/03/2021    CRP 8.52 (H) 02/02/2021    CRP 6.71 (H) 02/01/2021 *Hyperbilirubinemia  -Worse than baseline, primarily direct. MRCP as above. Discussed with GI, plan for ERCP and stenting to open obstruction if feasible.     *NSCLC, squamous cell type, metastatic  -initial clinical stage T1N0 and appeared to have immunotherapy unless develops clear recurrence of malignancy given his prior adverse side effects.  -Thus far his AFP has been stable therefore I suspect [address] remains in good control.   I suspect the metastatic progression is due to underlying lung cancer ra

## 2021-02-25 NOTE — OPERATIVE REPORT
Jem Kearns Patient Status:  Inpatient    10/11/1960 MRN [id number]   [facility] ENDOSCOPY Attending Carol Siddiqi DO   Date 2021 PCP Megan Sanchez DO     PREOPERATIVE DIAGNOSIS/INDICATION: Abnormal LFT';s metastatic malignan 260cm in length, we selectively cannulated the right hepatic duct and advanced a guidewire pass the short stricture.  We placed a biliary uncovered metal stent 8 mm x 4 cm successfully; at the end of the procedure the proximal end was located at the right i

## 2021-02-25 NOTE — PROGRESS NOTES
[facility]  Progress Note     Lesli Schaefer Patient Status:  Inpatient    10/11/1960 MRN [id number]   [facility] 4NW-A Attending Farooq Kinney, [address] [facility] Day # 1 PCP Vivienne Franco DO     Chief Complaint: Elevated LFTs    S: Wellington Ann Lab 02/25/21  0614   PTP 21.4*   INR 1.80*       No results for input(s): TROP, CK in the last 168 hours. Imaging: Imaging data reviewed in Epic.     Medications:   • lidocaine-menthol  1 patch Transdermal Q24H   • Pantoprazole Sodium  40 mg Oral

## 2021-02-25 NOTE — PLAN OF CARE
Pt alert and oriented x4, vitals stable on 3L oxygen. Denies any dyspnea. C/o pain mostly in low mid back; PRN morphine given with relief. Vitamin K given this morning. NPO for ERCP; consent signed and in the chart.  Pt updated on plan of care; will continu

## 2021-02-26 NOTE — PROGRESS NOTES
[facility] Gastroenterology Progress Note    CC: elevated lfts     S:  Pt with no acute events at this time. Pt anxious to go home.      O: /71 (BP Location: Right arm)   Pulse 90   Temp 97.8 °F (36.6 °C) (Oral)   Resp 16   Wt 222 lb (100.7 kg)

## 2021-02-26 NOTE — PROGRESS NOTES
Hematology/Oncology Progress Consultation Note    Patient Name: Yoselin Kim  Medical Record Number: [id number]    YOB: 1960     Reason for Consultation:  Yoselin Kim was seen today for the diagnosis of [address] NSCLC    Interval events: 19.4*   NEPRELIM 8.50* 6.29 8.81*  --        Recent Labs   Lab 02/24/21  1008 02/25/21  0614 02/26/21  0535   * 128* 128*   K 3.2* 3.9 4.1   CL 93* 95* 97*   CO2 25.0 23.0 21.0   BUN 13 14 20*   CREATSERUM 0.75 0.61* 0.68*   GFRAA 114 125 119   GFRNA ESRML 87 (H) 02/02/2021    ESRML 8 09/03/2020    ESRML 37 (H) 06/07/2019    ESRML 25 (H) 12/17/2018     Lab Results   Component Value Date    CRP 12.80 (H) 02/23/2021    CRP 11.10 (H) 02/16/2021    CRP 9.67 (H) 02/09/2021    CRP 8.48 (H) 02/03/2021    CRP Impression & Plan:     *Hyperbilirubinemia  -Worse than baseline, primarily direct. S/p ERCP with placement of uncovered metal stent in right hepatic duct  to common hepatic duct.    -bili not improved yet this morning.   -will repeat CMP tues next we infliximab.  Will not plan to resume immunotherapy unless develops clear recurrence of malignancy given his prior adverse side effects.  -Thus far his AFP has been stable therefore I suspect [address] remains in good control.   I suspect the metastatic progressio

## 2021-02-26 NOTE — PROCEDURES
[facility]  Procedure Note    Gerry Aragon Patient Status:  Inpatient    10/11/1960 MRN [id number]   [facility] 4NW-A Attending Mendez Robles, [address] [facility] Day # 2 PCP Adeline Medrano DO     Procedure: right chest port placement    Pr

## 2021-02-26 NOTE — DISCHARGE SUMMARY
[facility] [place] HOSPITALIST  DISCHARGE SUMMARY     Samreen Talamantes Patient Status:  Inpatient    10/11/1960 MRN [id number]   [facility] 4NW-A Attending Hanane Marks DO   Hosp Day # 2 PCP Sridevi Mackenzie DO     Date of Admission: 2021  Date o Incidental or significant findings and recommendations (brief descriptions):  ? None    Lab/Test results pending at Discharge:   · None    Consultants:  ? Oncology  ? GI  ?  Hospice     Discharge Medication List:     Medication List      CHANGE how you take STOP taking these medications    acetaminophen 500 MG Tabs  Commonly known as: TYLENOL EXTRA STRENGTH     carvedilol 12.5 MG Tabs  Commonly known as: COREG     spironolactone 25 MG Tabs  Commonly known as: ALDACTONE           Where to Get Your Medications IF YOU REQUIRE ORAL SEDATION FOR YOUR MRI: Your physician is responsible for giving you a prescription for oral medication which you would fill at your local pharmacy.  If you will be taking oral sedation, you must bring a  who will drive you home (th - (Estimate) We will provide you with your estimated remaining deductible and coinsurance balance for your services at the time of check in.    - (Payment) Please be aware that you may be asked for payment at the time of service.    - (Questions) If you wo **IF YOU NEED LABWORK OR AN INFUSION ALONG WITH YOUR APPOINTMENT, YOU MUST CALL TO SCHEDULE.**  Your appointment is scheduled at the [facility] in [place]. The address is [address] [place] [address].  Please park at the [address] [place] [address] and

## 2021-02-26 NOTE — PLAN OF CARE
Patient A&O x 4.  VS stable, requiring 2-3L oxygen at night while sleeping. Mild complaints of pain, controlled with PRNs. Denies n/v.  NPO at midnight for possible PICC or Port placement today. Fall precautions in place.   Continue to monitor, [facility]

## 2021-02-26 NOTE — PROGRESS NOTES
[facility]  Progress Note     Melvina Rodriguez Patient Status:  Inpatient    10/11/1960 MRN [id number]   [facility] 4NW-A Attending Brennon Mora, [address] [facility] Day # 2 PCP America Hartman DO     Chief Complaint: Elevated LFTs    S: Tess Ochoa Clearance: 121.5 mL/min (A) (based on SCr of 0.68 mg/dL (L)). Recent Labs   Lab 02/25/21  0614 02/26/21  0535   PTP 21.4* 20.1*   INR 1.80* 1.66*       No results for input(s): TROP, CK in the last 168 hours.          Imaging: Imaging data reviewed in Ep

## 2021-02-27 NOTE — PROGRESS NOTES
Oxygen Walk results:      SPO2% on Room Air at Rest: 92 (02/27/21 0847)  SPO2% on Oxygen at Rest: 92(ROOM AIR) (02/27/21 0847)  At rest oxygen flow (liters per minute): 0 (02/27/21 0847)  SPO2% Ambulation on Room Air: 84 (02/27/21 0847)  SPO2% Ambulation o

## 2021-02-27 NOTE — PLAN OF CARE
Pt alert and oriented x4, eager to go home today. Pt on 2L oxygen at rest; per pt, not on oxygen at home. NPO for possible port placement. Awaiting confirmation from IR whether or not MD will place port. Pt updated on plan of care.  Will continue to monitor

## 2021-02-27 NOTE — CONSULTS
[facility] PULMONARY/CRITICAL CARE CONSULTATION    HPI: Pt is a 64 y/o WM with hx of metastatic NSCLC and liver cirrhosis that presented for elevated LFT's. The patient has been treated recently with multiple courses of abx for poss pneumonia.   The patient under Wears glasses 1980   • Weight loss      Past Surgical History:   Procedure Laterality Date   • BIOPSY OF SKIN LESION      face and leg   • COLONOSCOPY N/A 9/17/2020    Performed by Mik Perez MD at [facility] ENDOSCOPY   • ENDOBRONCHIAL ULTRASOUND (EBUS) N/ breakfast.   Potassium Chloride ER 20 MEQ Oral Tab CR   No No   Sig: Take 1 tablet (20 mEq total) by mouth daily. acetaminophen 500 MG Oral Tab   Yes No   Sig: Take 500-1,000 mg by mouth 2 (two) times daily as needed for Pain.    carvedilol 12.5 MG Oral T Occupational History      Not on file    Social Needs      Financial resource strain: Not on file      Food insecurity        Worry: Not on file        Inability: Not on file      Transportation needs        Medical: Not on file        Non-medical: Not on Not Asked        Exercise: Yes          active at work        Bike Helmet: Not Asked        Seat Belt: Yes        Self-Exams: Not Asked        Left Handed: Not Asked        Right Handed: Yes        Currently spends a great deal of time in the sun: Yes 19.3* 19.4*   NEPRELIM 8.50* 6.29 8.81*  --    WBC 10.2 7.6 10.6 10.4   .0* 100.0* 111.0* 91.0*     Recent Labs   Lab 02/25/21  0614 02/26/21  0535 02/27/21  0541   * 116* 132*   BUN 14 20* 24*   CREATSERUM 0.61* 0.68* 0.84   GFRAA 125 119 10 O2.  -will follow PRN, call with questions  -follow up with pulm in ~2 weeks with SST to reassess need for O2. Ever Urrutia M.D.   Pulmonary/Critical Care and Sleep Medicine

## 2021-02-27 NOTE — PLAN OF CARE
Pt alert and oriented x2. Pt tired tired and sleeping in bed comfortably. Pt on 3.5 L NC and saturations in the mid 90s. Will attempt to wean.   MD aware of elevated DDimer and CT of chest results made aware to MD.  No PE noted, no new orders at this aisha

## 2021-02-27 NOTE — PLAN OF CARE
Pt AO x4, up with SBA, tolerating diet well, VSS, pain noted in lower to mid back treated with prn pain medicine. 1800 fluid restriction maintained. Pulmonology consulted today. O2 walk completed, SOB with exertion, see note.  Pt weened to room air at rest,

## 2021-02-27 NOTE — PROGRESS NOTES
Hematology/Oncology Progress Consultation Note    Patient Name: Mignon Adame  Medical Record Number: [id number]    YOB: 1960     Reason for Consultation:  Mignon Adame was seen today for the diagnosis of [address] NSCLC    Interval events: 28.0   BUN 14 20* 24*   CREATSERUM 0.61* 0.68* 0.84   GFRAA 125 119 109   GFRNAA 108 103 95   * 116* 132*   CA 8.2* 8.4* 8.2*   TP 7.3 7.1 7.2   ALB 1.8* 1.6* 1.7*   ALKPHO 171* 173* 172*   * 120* 135*   ALT 43 47 50   BILT 7.2* 8.6* 10.9*

## 2021-02-27 NOTE — PROGRESS NOTES
[facility]  Progress Note     Lenny Corbett Patient Status:  Inpatient    10/11/1960 MRN [id number]   [facility] 4NW-A Attending Martir Dooley, [address] [facility] Day # 3 PCP Bridgette Carvalho DO     Chief Complaint: Elevated LFTs    S: Laretta Runner Estimated Creatinine Clearance: 98.4 mL/min (based on SCr of 0.84 mg/dL). Recent Labs   Lab 02/25/21  0614 02/26/21  0535   PTP 21.4* 20.1*   INR 1.80* 1.66*       No results for input(s): TROP, CK in the last 168 hours.          Imaging: Imaging d DO

## 2021-02-28 NOTE — PROGRESS NOTES
Hematology/Oncology Progress Consultation Note    Patient Name: Yoselin Kim  Medical Record Number: [id number]    YOB: 1960     Reason for Consultation:  Yoselin Kim was seen today for the diagnosis of [address] NSCLC    Interval events: Lab 02/25/21  0614 02/26/21  0535   INR 1.80* 1.66*       Impression & Plan:     # Hyperbilirubinemia: S/p ERCP with placement stent. Bili higher today. Check CT. # Hypoxia: Due to disease progression. Seen by pulmonology.   Imaging yesterday show

## 2021-02-28 NOTE — PLAN OF CARE
Pt AO x4, up with SBA, tolerating diet well, VSS, afebrile. 1800 fluid restriction maintained. 2-3 L O2 intermittently throughout day. CT of abd/plevis completed today.  Pt's BP running low 100's, MD aware, instructed to hold AM dose of aldactone and Lasix

## 2021-02-28 NOTE — PLAN OF CARE
Pt alert and oriented x3. VSS and afebrile. Pt voiding in BR with assist x1. Pt weaned down to 2L NC with saturations in the low 90s overnight. Pt more responsive this evening and less confused. No acute events overnight. Fall precautions maintained.

## 2021-02-28 NOTE — DIETARY NOTE
[address] ASSESSMENT    Pt does not meet malnutrition criteria a this time. NUTRITION DIAGNOSIS/PROBLEM:    Increased nutrient needs related to physiologic causes as evidenced by hepatocellular carcinoma, NSCLC with mets.     Norman Joyner kg)  Protein: 117 grams protein/day (1.5 grams protein per kg of IBW;78.2 kg)  Fluid: ~1 ml/kcal or per MD discretion    MONITOR AND EVALUATE/NUTRITION GOALS:  1. PO intake to meet at least 75% patient nutrition prescription  2.  At least 75% intake of oral

## 2021-02-28 NOTE — PROGRESS NOTES
[facility] [place] HOSPITALIST  Progress Note     Rowena Castellanos Patient Status:  Inpatient    10/11/1960 MRN [id number]   [facility] 4NW-A Attending Danielle Baca, [address] [facility] Day # 4 PCP Dhruv Gamez DO     Chief Complaint: Elevated LFTs    S: Leela Gilbert 7.5       Estimated Creatinine Clearance: 107.3 mL/min (based on SCr of 0.77 mg/dL). Recent Labs   Lab 02/25/21  0614 02/26/21  0535   PTP 21.4* 20.1*   INR 1.80* 1.66*       No results for input(s): TROP, CK in the last 168 hours.          Imaging: Imag home    Plan of care discussed with patient and family at bedside.      Skye Kumar, DO

## 2021-03-01 NOTE — CM/SW NOTE
ANDRES called Jorge at [facility] who stated the pt was not current w/them. Will follow up w/pt/family to see if they would like HH at [place].

## 2021-03-01 NOTE — PROGRESS NOTES
Hematology/Oncology Progress Consultation Note    Patient Name: Kiersten Robbins  Medical Record Number: [id number]    YOB: 1960     Reason for Consultation:  Kiersten Robbins was seen today for the diagnosis of [address] NSCLC    Interval events: CL 96* 95* 93*   CO2 28.0 22.0 21.0   BUN 24* 24* 27*   CREATSERUM 0.84 0.77 1.13   GFRAA 109 113 81   GFRNAA 95 98 70   * 135* 119*   CA 8.2* 8.7 8.9   TP 7.2 7.5 7.6   ALB 1.7* 1.7* 1.8*   ALKPHO 172* 191* 195*   * 210* 214*   ALT 50 65* CRP 12.80 (H) 02/23/2021    CRP 11.10 (H) 02/16/2021    CRP 9.67 (H) 02/09/2021    CRP 8.48 (H) 02/03/2021    CRP 8.52 (H) 02/02/2021    CRP 6.71 (H) 02/01/2021    CRP 3.38 (H) 01/19/2021    CRP 1.39 (H) 12/22/2020    CRP 0.70 (H) 09/02/2020    CRP <0.29 0 clinical stage T1N0 and appeared to have a good response to nivolumab to the primary lesion, however has now developed disease involving 10 R lymph node.   Also has multifocal metastatic disease in on imaging involving bilateral lungs, brain, and liver whic esophagitis and gastritis requiring infliximab.  Will not plan to resume immunotherapy unless develops clear recurrence of malignancy given his prior adverse side effects.  -Thus far his AFP has been stable therefore I suspect [address] remains in good control.

## 2021-03-01 NOTE — PLAN OF CARE
Pt A&Ox2-3. VS stable. No complaints of pain. Pt is restless and forgetful. Pt's life partner and pt had opportunity to meet with oncology to discuss plan of care, and decided to go home with hospice.  Hospice consult at bedside this afternoon, consents sig

## 2021-03-01 NOTE — PROGRESS NOTES
Pt is A&Ox2-3/. Pt is at times disoriented to place and situation. Pt on be alarm and intermittently attempts to get out of bed. Fall precautions in place. Pt re-oriented  when necessary. Pt occasionally takes gown off. Pt ripped out left hand IV.  Port in

## 2021-03-01 NOTE — PROGRESS NOTES
D[facility] HOSPITALIST  Progress Note     Sonja Payer Patient Status:  Inpatient    10/11/1960 MRN [id number]   [facility] 4NW-A Attending Rafael Locke, [address] [facility] [place] 2[facility] Day # 5 PCP Toby Rodriguez DO     Chief Complaint: Elevated LFTs    S: Alben Desanctis ALT 50 65* 69*   BILT 10.9* 12.3* 14.6*   TP 7.2 7.5 7.6       Estimated Creatinine Clearance: 73.1 mL/min (based on SCr of 1.13 mg/dL).     Recent Labs   Lab 02/25/21  0614 02/26/21  0535 03/01/21  1002   PTP 21.4* 20.1* 22.8*   INR 1.80* 1.66* 1.96* Full  · [place]: No  · Central line: No  · If COVID testing is negative, may discontinue isolation: yes     Will the patient be referred to TCC on discharge?: TBD  Estimated date of discharge: 1-2 days  Discharge is dependent on: clinical progress  At this p

## 2021-03-01 NOTE — CM/SW NOTE
Met with pt and wife. Pt confused . Discussed hospice services and goals of care . Wife, Shaheed Johnson at bedside . Signed hospice consents. Plan for pt to discharge tomorrow to home at 12. Ambulance and pcs form will be completed.

## 2021-03-01 NOTE — PROGRESS NOTES
Gastroenterology Progress Note  Ricardoleodan Kim Patient Status:  Inpatient    10/11/1960 MRN [id number]   [facility] 4NW-A Attending Lazarus Handy, [address] [facility] Day # 5 PCP Krista Hooper DO dome of the diaphragm to the pubic symphysis with non-ionic intravenous contrast material. Post contrast coronal MPR imaging was performed. Dose reduction techniques were used.  Dose information is   transmitted to the [facility] [employer]) N pulmonary and hepatic metastatic lesions. 2. Prominent mesenteric, periportal, and retroperitoneal lymph nodes likely representing metastatic disease.   3. Prominent splenorenal shunt with recanalization of the umbilical vein consistent with portal hyperte

## 2021-03-01 NOTE — CM/SW NOTE
[facility] hospice stating they talked to the MD in regards to hospice consult. Will meet w/family later today.

## 2021-03-01 NOTE — TELEPHONE ENCOUNTER
Ginna Everett calling to let dr know pt being discharged tomorrow to evaluate 3/2 for hospice care.  daniela

## 2021-03-02 NOTE — PROGRESS NOTES
NURSING DISCHARGE NOTE    Discharged Home via Ambulance. Accompanied by Support staff  Belongings Taken by patient/family. Pt A&Ox2-3. Drowsy, and sleeping most of AM. Awakens when aroused. Pt discharged home with hospice.  POLST form completed by hu

## 2021-03-02 NOTE — TELEPHONE ENCOUNTER
Luis Felipe Quintana says the patient was admitted to routine hospice care in his home today 3/2. She thanks you for the privilege of caring for this patient.

## 2021-03-02 NOTE — HOSPICE RN NOTE
Pt awake, drowsy. Will d/c today at noon. Reviewed d/c with Dr. René Hazel and with RN Jaclyn Hunter. Life partner Fernanda Erickson updated on plan. PCS from completed.

## 2021-03-02 NOTE — PLAN OF CARE
Alert and oriented x 2-3. Drowsy. VSS. On 3-4L of O2 overnight. Occasionally trying to get out of bed. Pt had not urinated overnight. Bladder scan showed 480ml of urine. Straight cathed and got 420ml out. Plan to dc with hospice today.

## 2021-03-02 NOTE — PROGRESS NOTES
Hematology/Oncology Progress Consultation Note    Patient Name: Jules Sánchez  Medical Record Number: [id number]    YOB: 1960     Reason for Consultation:  Jules Sánchez was seen today for the diagnosis of [address] NSCLC    Interval events: 03/02/21  0533   * 129* 132*   K 3.8 3.8 3.6   CL 95* 93* 97*   CO2 22.0 21.0 24.0   BUN 24* 27* 32*   CREATSERUM 0.77 1.13 0.97   GFRAA 113 81 97   GFRNAA 98 70 84   * 119* 105*   CA 8.7 8.9 8.7   TP 7.5 7.6 7.0   ALB 1.7* 1.8* 1.7*   ALKPHO ESRML 25 (H) 12/17/2018     Lab Results   Component Value Date    CRP 12.80 (H) 02/23/2021    CRP 11.10 (H) 02/16/2021    CRP 9.67 (H) 02/09/2021    CRP 8.48 (H) 02/03/2021    CRP 8.52 (H) 02/02/2021    CRP 6.71 (H) 02/01/2021    CRP 3.38 (H) 01/19/2021 further treatment for [address] either way. *Brain metastases  -Likely lung cancer origin. Metastases are small and asymptomatic but multiple.   Plan to transition to hospice as above    *Cancer related pain  -MS Contin 15mg BID, continue morphine IR prn as w

## 2021-03-04 ENCOUNTER — SOCIAL WORK SERVICES (OUTPATIENT)
Dept: HEMATOLOGY/ONCOLOGY | Facility: HOSPITAL | Age: 61
End: 2021-03-04

## 2021-03-04 ENCOUNTER — TELEPHONE (OUTPATIENT)
Dept: HEMATOLOGY/ONCOLOGY | Facility: HOSPITAL | Age: 61
End: 2021-03-04

## 2021-03-04 NOTE — TELEPHONE ENCOUNTER
Merna Bonner   ([phone]) called and stated that the patient passed away peacefully at home yesterday 3/3/21 at 8:20 p.m. Thank you.  Katelyn

## 2021-06-16 ENCOUNTER — TELEPHONE (OUTPATIENT)
Dept: HEMATOLOGY/ONCOLOGY | Facility: HOSPITAL | Age: 61
End: 2021-06-16

## 2021-06-16 NOTE — TELEPHONE ENCOUNTER
[facility] is calling for Clinical Trials and e mail was sent about the match on 6/4/21.

## 2022-07-20 NOTE — TELEPHONE ENCOUNTER
Left message for post-operative patient to please call the office with any questions and/or concerns. Reminded patient of next RN appointment on 11/18, 11/25, and MD appointment on 11/30. Dr. Adrianne Vaughn notified.
(E4) spontaneous

## 2022-10-10 NOTE — H&P
[facility] HOSPITALIST  History and Physical     Nick Carreon Patient Status:  Emergency    10/11/1960 MRN [id number]   Location [address] [facility] Attending Nick Carreon MD   Hosp Day # 0 PCP Jori Mauro DO     Chief Complai (BMI 35.0-39. 9) 8/22/2017   • Symptomatic varicose veins of both lower extremities 8/22/2017   • Thrombocytopenia ([address] 8/22/2017   • Uncomfortable fullness after meals    • Unspecified essential hypertension    • Unspecified open wound, left foot, initial Take 10 mg by mouth 4 (four) times daily before meals and nightly., Disp: , Rfl:     •  Eszopiclone (LUNESTA) 3 MG Oral Tab, Take 3 mg by mouth nightly., Disp: 30 tablet, Rfl: 5    •  lidocaine 4 % External Patch, Place 1 patch onto the skin daily. , Disp: HGB 10.6*   MCV 96.0   PLT 87.0*       Recent Labs   Lab 02/01/21  1313   *   BUN 8   CREATSERUM 0.81   GFRAA 111   GFRNAA 96   CA 8.4*   ALB 2.4*   *   K 4.1      CO2 23.0   ALKPHO 122*   AST 42*   ALT 34   BILT 2.4*   TP 7.4       Es 56F w PMHX of uterine carcinoma (s/p hysterectomy [facility] BSO, 2020, ongoing chemotherapy (last session 9/14; known to Dr. Danny Celaya) s/p radiation, w/ hx of b/l malignant pleural effusions s/p R VATS 10/2021, PleurX catheter removed 3/22, and R thoracentesis by IR and L chest tube by thoracic surgery on prev admission 6/11/22, presenting for worsening SOB and found to have reaccumulated b/l pleural effusions.      #SOB: in the setting of likely malignant pleural effusion. Imaging results reviewed. CT SX and IR recs appreciated. s/p L thoracentesis with chest tube. Chest tube removed on 10/8. Pt was weaned off O2, however, pt intermittently placed on NC for comfort. O2 sat while ambulating was 92%. Discussed with pt that she does not need oxygen and pt stated that it may be psychological.     # constipation: Pt has been on bowel regimen and reports feeling constipated, passing small amounts of stool, having flatus. She has been refusing enemas and some of the bowel regimen. Disimpaction attempted but unsuccessful. Low suspicion for obstruction. Will try SMOG enema, obtain portable x ray     #Uterine cancer: f/u onc recs

## 2023-11-08 NOTE — TELEPHONE ENCOUNTER
Patient soke with [place] as well as social work. Pt now agreeable to staying for medical clearance + cardiology consult. Surgery is scheduled on TOMORROW 11/10/20  Insurance is [facility]  Thank you! Patient soke with [place] 20-Jun-2022 02:49

## 2023-12-26 NOTE — PROGRESS NOTES
12/26/23 0939   Prechemo Checklist   Has the patient been in the hospital, ED, or urgent care since last date of service No   Chemo/Immuno Consent Signed Yes   Protocol/Indications Verified Yes   Confirmed to previous date/time of medication Yes   Compared to previous dose Yes   All medications are dated accurately Yes   Pregnancy Test Negative Not applicable   Parameters Met Yes   BSA/Weight-Height Verified Yes   Dose Calculations Verified Yes        Pt here for C16D1.   Arrives Ambulating independently, accompanied by Self           Patient denies possible pregnancy since last therapy cycle: Not Applicable    Modifications in dose or schedule: No     Frequency of blood return and site check throughout

## 2024-11-24 NOTE — OPERATIVE REPORT
EGD operative report  Patient Name: Dominique Romero  Procedure: Esophagogastroduodenoscopy with biopsy  Indication: abnormal CT, epigastric pain  Attending: Emeka Rodriguez M.D.   Consent:  The risks, benefits, and alternatives were discussed with the patien or erosions seen. The stomach does distend normally with air insufflation. The pylorus is widely patent. The gastric mucosa was biopsied.    Duodenum: The mucosa of the entire examined duodenum is abnormal.  The duodenal bulb, post-bulbar duodenum, and d alone

## (undated) DEVICE — SOL  .9 1000ML BTL

## (undated) DEVICE — 1200CC GUARDIAN II: Brand: GUARDIAN

## (undated) DEVICE — SUTURE SILK 0 FSL

## (undated) DEVICE — SPECIMEN CONTAINER,POSITIVE SEAL INDICATOR, OR PACKAGED: Brand: PRECISION

## (undated) DEVICE — BANDAGE ROLL,100% COTTON, 6 PLY, LARGE: Brand: KERLIX

## (undated) DEVICE — FILTERLINE NASAL ADULT O2/CO2

## (undated) DEVICE — FORCEPS BX 100CM 1.8MM RJ STD

## (undated) DEVICE — MEDI-VAC YANKAUER SUCTION HANDLE W/BULBOUS TIP: Brand: CARDINAL HEALTH

## (undated) DEVICE — TRAP 4 CPTR CHMBR N EZ INLN

## (undated) DEVICE — DRAPE SHEET LG

## (undated) DEVICE — SUCTION CANISTER, 3000CC,SAFELINER: Brand: DEROYAL

## (undated) DEVICE — SINGLE USE SUCTION VALVE MAJ-209: Brand: SINGLE USE SUCTION VALVE (STERILE)

## (undated) DEVICE — SYRINGE 50ML LL TIP

## (undated) DEVICE — LOWER EXTREMITY CDS-LF: Brand: MEDLINE INDUSTRIES, INC.

## (undated) DEVICE — TOWEL OR BLU 16X26 STRL

## (undated) DEVICE — MEDI-VAC SUCTION HANDLE REGULAR CAPACITY: Brand: CARDINAL HEALTH

## (undated) DEVICE — TOURNIQUET CUFF 24 DUAL STR

## (undated) DEVICE — TRAY SKIN PREP PVP-1

## (undated) DEVICE — FORCEP BIOPSY RJ4 LG CAP W/ND

## (undated) DEVICE — SUTURE PROLENE 2-0 CT-2

## (undated) DEVICE — ABDOMINAL PAD: Brand: DERMACEA

## (undated) DEVICE — Device: Brand: DEFENDO AIR/WATER/SUCTION AND BIOPSY VALVE

## (undated) DEVICE — SPONGE LAP 4X18 XRAY STRL

## (undated) DEVICE — REM POLYHESIVE ADULT PATIENT RETURN ELECTRODE: Brand: VALLEYLAB

## (undated) DEVICE — SPECIMEN TRAP LUKI

## (undated) DEVICE — LOCKING DEVICE RX & BIOPSY CAP

## (undated) DEVICE — ENDOSCOPY PACK - LOWER: Brand: MEDLINE INDUSTRIES, INC.

## (undated) DEVICE — SINGLE USE BIOPSY VALVE MAJ-210: Brand: SINGLE USE BIOPSY VALVE (STERILE)

## (undated) DEVICE — 3M™ RED DOT™ MONITORING ELECTRODE WITH FOAM TAPE AND STICKY GEL, 50/BAG, 20/CASE, 72/PLT 2570: Brand: RED DOT™

## (undated) DEVICE — GLOVE SURG TRIUMPH SZ 8

## (undated) DEVICE — MEDI-VAC NON-CONDUCTIVE SUCTION TUBING: Brand: CARDINAL HEALTH

## (undated) DEVICE — PEN 6\" SURGICAL MARKING PURPL

## (undated) DEVICE — STANDARD HYPODERMIC NEEDLE,POLYPROPYLENE HUB: Brand: MONOJECT

## (undated) DEVICE — FAN SPRAY KIT: Brand: PULSAVAC®

## (undated) DEVICE — ABDOMINAL PAD: Brand: CURITY

## (undated) DEVICE — ENDOSCOPY PACK UPPER: Brand: MEDLINE INDUSTRIES, INC.

## (undated) DEVICE — PROXIMATE RH ROTATING HEAD SKIN STAPLERS (35 WIDE) CONTAINS 35 STAINLESS STEEL STAPLES: Brand: PROXIMATE

## (undated) DEVICE — OCCLUSIVE GAUZE STRIP OVERWRAP,3% BISMUTH TRIBROMOPHENATE IN PETROLATUM BLEND: Brand: XEROFORM

## (undated) DEVICE — ZIMMER® STERILE DISPOSABLE TOURNIQUET CUFF WITH PLC, DUAL PORT, SINGLE BLADDER, 18 IN. (46 CM)

## (undated) DEVICE — USE ITEM #176901

## (undated) DEVICE — STERILE SYNTHETIC POLYISOPRENE POWDER-FREE SURGICAL GLOVES WITH HYDROGEL COATING: Brand: PROTEXIS

## (undated) DEVICE — SNARE 9MM 230CM 2.4MM EXACTO

## (undated) DEVICE — SYRINGE 10ML LL TIP

## (undated) DEVICE — GAMMEX® PI HYBRID SIZE 7, STERILE POWDER-FREE SURGICAL GLOVE, POLYISOPRENE AND NEOPRENE BLEND: Brand: GAMMEX

## (undated) DEVICE — BOWLS UTILITY 16OZ

## (undated) DEVICE — GOWN SURG AERO BLUE PERF LG

## (undated) DEVICE — LOWER EXTREMITY: Brand: MEDLINE INDUSTRIES, INC.

## (undated) DEVICE — STERILE TETRA-FLEX CF, ELASTIC BANDAGEE, 3" X 5.5YD: Brand: TETRA-FLEX™CF

## (undated) DEVICE — 60 ML SYRINGE REGULAR TIP: Brand: MONOJECT

## (undated) DEVICE — MASK ISOLATION

## (undated) DEVICE — GAUZE SPONGES,12 PLY: Brand: CURITY

## (undated) DEVICE — SPHINCTEROTOME: Brand: HYDRATOME RX 44

## (undated) DEVICE — CAST PADDING SYNTH 4

## (undated) DEVICE — STERILE TETRA-FLEX CF, ELASTIC BANDAGE, 4" X 5.5YD: Brand: TETRA-FLEX™CF

## (undated) DEVICE — STERILE TETRA-FLEX CF, ELASTIC BANDAGE LATEX FREE 6IN X5.5 YD: Brand: TETRA-FLEX™CF

## (undated) DEVICE — SUPER SPONGES,MEDIUM: Brand: KERLIX

## (undated) DEVICE — KENDALL SCD EXPRESS SLEEVES, KNEE LENGTH, MEDIUM: Brand: KENDALL SCD

## (undated) DEVICE — SYRINGE 10ML SLIP TIP

## (undated) DEVICE — SPLINT PRECUT SYNTH 5X30

## (undated) DEVICE — SINGLE USE ASPIRATION NEEDLE: Brand: SINGLE USE ASPIRATION NEEDLE

## (undated) DEVICE — SOL H2O 1000ML BTL

## (undated) DEVICE — PREMIUM WET SKIN PREP TRAY: Brand: MEDLINE INDUSTRIES, INC.

## (undated) DEVICE — DERMATOME BLADES: Brand: DERMATOME

## (undated) NOTE — LETTER
[address]  [address] [facility] [address], [address] [facility]  Authorization for Surgical Operation and Procedure     Date:___________                                                                                                         Time:__________ potential risks that can occur: fever and allergic reactions, hemolytic reactions, transmission of diseases such as Hepatitis, AIDS and Cytomegalovirus (CMV) and fluid overload.   In the event that I wish to have an autologous transfusion of my own blood, o attending physician will determine when the applicable recovery period ends for purposes of reinstating the DNAR order.   10. Patients having a sterilization procedure: I understand that if the procedure is successful the results will be permanent and it wi a. Allow the anesthesiologist (anesthesia doctor) to give me medicine and do additional procedures as necessary.  Some examples are: Starting or using an “IV” to give me medicine, fluids or blood during my procedure, and having a breathing tube placed to he 7. Regional Anesthesia (“spinal”, “epidural”, & “nerve blocks”): I understand that rare but potential complications include headache, bleeding, infection, seizure, irregular heart rhythms, and nerve injury.     I can change my mind about having anesthesia

## (undated) NOTE — LETTER
Rebecca Nunez [address]  [address] [facility] [address], [address] [facility]  Authorization for Surgical Operation and Procedure     Date:___________                                                                                                         Time:__________ 4.   Should the need arise during my operation or immediate post-operative period, I also consent to the administration of blood and/or blood products.   Further, I understand that despite careful testing and screening of blood or blood products by bettye 8.   I recognize that in the event my procedure results in extended X-Ray/fluoroscopy time, I may develop a skin reaction. 9.  If I have a Do Not Attempt Resuscitation (DNAR) order in place, that status will be suspended while in the operating room, proc 1. I, Kinza Galindo agree to be cared for by an anesthesiologist, who is specially trained to monitor me and give me medicine to put me to sleep or keep me comfortable during my procedure    I understand that my anesthesiologist is not an employee or age 5. My doctor has explained to me other choices available to me for my care (alternatives).   6. Pregnant Patients (“epidural”):  I understand that the risks of having an epidural (medicine given into my back to help control pain during labor), include itchi

## (undated) NOTE — LETTER
[address]becca [address]z [address]  [address] [facility] [address], 209 [facility]  Authorization for Surgical Operation and Procedure     Date:___________                                                                                                         Time:__________ 4.   Should the need arise during my operation or immediate post-operative period, I also consent to the administration of blood and/or blood products.   Further, I understand that despite careful testing and screening of blood or blood products by bettye 8.   I recognize that in the event my procedure results in extended X-Ray/fluoroscopy time, I may develop a skin reaction. 9.  If I have a Do Not Attempt Resuscitation (DNAR) order in place, that status will be suspended while in the operating room, proc 1. I, Miranda Galindo agree to be cared for by an anesthesiologist, who is specially trained to monitor me and give me medicine to put me to sleep or keep me comfortable during my procedure    I understand that my anesthesiologist is not an employee or age 5. My doctor has explained to me other choices available to me for my care (alternatives).   6. Pregnant Patients (“epidural”):  I understand that the risks of having an epidural (medicine given into my back to help control pain during labor), include itchi

## (undated) NOTE — IP AVS SNAPSHOT
[address]            (For Outpatient Use Only) Initial Admit Date: 2/24/2021   Inpt/Obs Admit Date: Inpt: 2/24/21 / Obs: N/A   Discharge Date:    Selvin Binet:  [de-identified]   MRN: [de-identified]   CSN: [id number]   CEID: [id number] Group Number:  Insurance Type:    Subscriber Name:  Subscriber :    Subscriber ID:  Pt Rel to Subscriber:    Hospital Account Financial Class: [facility]    2021

## (undated) NOTE — LETTER
20      Patient: Gerry Aragon  : 10/11/1960 Visit date: 2020    Dear Evert Rod,      I examined your patient in consultation today. He is 27 days post left foot debridement and split-thickness skin graft.     Graft and donor s

## (undated) NOTE — Clinical Note
FYI, HFU call made, see notes. NCM attempted to schedule a HFU non-TCM as patient is in a TCM episode at present. Patient states he has to follow up with the specialists first. Message sent to MD's office.

## (undated) NOTE — LETTER
[facility] [address], [address] [place] [address]    Consent for Anesthesia   1.    Omid Hammond agree to be cared for by a physician anesthesiologist alone and/or with a nurse anesthetist, who is specially trained to monitor me and give me allergic reactions to medications, injury to my airway, heart, lungs, vision, nerves, or muscles and in extremely rare instances death. 5. My doctor has explained to me other choices available to me for my care (alternatives).   6. Pregnant Patients (“epid Printed: 9/3/2020 at 1:25 PM    Medical Record #: [id number]                                            Page 1 of 1

## (undated) NOTE — LETTER
Rebecca [address]z [address]  [address] [facility] [address], [address] [facility]  Authorization for Surgical Operation and Procedure     Date:___________                                                                                                         Time:__________ 4.   Should the need arise during my operation or immediate post-operative period, I also consent to the administration of blood and/or blood products.   Further, I understand that despite careful testing and screening of blood or blood products by bettye 8.   I recognize that in the event my procedure results in extended X-Ray/fluoroscopy time, I may develop a skin reaction. 9.  If I have a Do Not Attempt Resuscitation (DNAR) order in place, that status will be suspended while in the operating room, proc 1. IKris agree to be cared for by an anesthesiologist, who is specially trained to monitor me and give me medicine to put me to sleep or keep me comfortable during my procedure    I understand that my anesthesiologist is not an employee or age 5. My doctor has explained to me other choices available to me for my care (alternatives).   6. Pregnant Patients (“epidural”):  I understand that the risks of having an epidural (medicine given into my back to help control pain during labor), include itchi

## (undated) NOTE — Clinical Note
FYI, TCM call made, see notes. FADI attempted to schedule a TCM HFU, patient states he will call Dr. John Duron himself to schedule. Message sent to MD's office.

## (undated) NOTE — LETTER
[facility] [address], [address] [facility]    Consent for Anesthesia   1.    Lincoln Fan agree to be cared for by a physician anesthesiologist alone and/or with a nurse anesthetist, who is specially trained to monitor me and give me · Rare risks include: remembering what happened during my procedure, allergic reactions to medications, injury to my airway, heart, lungs, vision, nerves, or muscles and in extremely rare instances death.   5. My doctor has explained to me other choices marshall Patient Name: Manolo Pena     : 10/11/1960                 Printed: 2021 at 10:09 AM    Medical Record #: [id number]                                            Page 1 of 1

## (undated) NOTE — LETTER
20      Patient: Melvina Rodriguez  : 10/11/1960 Visit date: 2020    Dear Michael Bryant,      I examined your patient in consultation today. He has a clean, granulating wound of the left foot.   We will plan on split-thickness skin gra

## (undated) NOTE — LETTER
Rebecca Nunez [address]  [address] [facility] [address], [address] [facility]  Authorization for Surgical Operation and Procedure     Date:___________                                                                                                         Time:__________ 4.   Should the need arise during my operation or immediate post-operative period, I also consent to the administration of blood and/or blood products.   Further, I understand that despite careful testing and screening of blood or blood products by bettye 8.   I recognize that in the event my procedure results in extended X-Ray/fluoroscopy time, I may develop a skin reaction. 9.  If I have a Do Not Attempt Resuscitation (DNAR) order in place, that status will be suspended while in the operating room, proc 1. ICarolina agree to be cared for by an anesthesiologist, who is specially trained to monitor me and give me medicine to put me to sleep or keep me comfortable during my procedure    I understand that my anesthesiologist is not an employee or age 5. My doctor has explained to me other choices available to me for my care (alternatives).   6. Pregnant Patients (“epidural”):  I understand that the risks of having an epidural (medicine given into my back to help control pain during labor), include itchi

## (undated) NOTE — LETTER
Your patient was recently seen at the [facility] for a hospital follow-up visit. The visit note is attached. Please contact the clinic with any questions at [phone].     Thank you,  ARVIND Gray

## (undated) NOTE — LETTER
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     I understand that th have questions, by calling [facility] at [phone]. Additional written information will be given to me prior to start of therapy. Additionally, I will receive a copy of this consent form.     I have read and fully understand this consent t

## (undated) NOTE — LETTER
[facility] [address], [address] [place] [address]    Consent for Anesthesia   1.    Tim Davila agree to be cared for by a physician anesthesiologist alone and/or with a nurse anesthetist, who is specially trained to monitor me and give me · Rare risks include: remembering what happened during my procedure, allergic reactions to medications, injury to my airway, heart, lungs, vision, nerves, or muscles and in extremely rare instances death.   5. My doctor has explained to me other choices marshall Patient Name: Terra Son     : 10/11/1960                 Printed: 2021 at 1:08 PM    Medical Record #: [id number]                                            Page 1 of 1

## (undated) NOTE — LETTER
Rebecca Nunez [address]  [address] [facility] [address], [address] [facility]  Authorization for Surgical Operation and Procedure     Date:___________                                                                                                         Time:__________ 4.   Should the need arise during my operation or immediate post-operative period, I also consent to the administration of blood and/or blood products.   Further, I understand that despite careful testing and screening of blood or blood products by bettye 8.   I recognize that in the event my procedure results in extended X-Ray/fluoroscopy time, I may develop a skin reaction. 9.  If I have a Do Not Attempt Resuscitation (DNAR) order in place, that status will be suspended while in the operating room, proc 1. IChamp agree to be cared for by an anesthesiologist, who is specially trained to monitor me and give me medicine to put me to sleep or keep me comfortable during my procedure    I understand that my anesthesiologist is not an employee or age 5. My doctor has explained to me other choices available to me for my care (alternatives).   6. Pregnant Patients (“epidural”):  I understand that the risks of having an epidural (medicine given into my back to help control pain during labor), include itchi

## (undated) NOTE — LETTER
[facility] [address], [address]  Consent for Procedure/Sedation    Date: 2/26/21    Time:       1. I authorize the performance upon Kirk Hollingsworth the following:  Central Venous Port Insertion     2.  I authorize Dr. Layo Kong (and Printed: 2021   12:56 PM  Patient Name: Iman Dunne        : 10/11/1960       Medical Record #: [id number]